# Patient Record
Sex: FEMALE | Race: BLACK OR AFRICAN AMERICAN
[De-identification: names, ages, dates, MRNs, and addresses within clinical notes are randomized per-mention and may not be internally consistent; named-entity substitution may affect disease eponyms.]

---

## 2017-01-29 ENCOUNTER — HOSPITAL ENCOUNTER (EMERGENCY)
Dept: HOSPITAL 25 - ED | Age: 47
Discharge: HOME | End: 2017-01-29
Payer: COMMERCIAL

## 2017-01-29 VITALS — SYSTOLIC BLOOD PRESSURE: 118 MMHG | DIASTOLIC BLOOD PRESSURE: 79 MMHG

## 2017-01-29 DIAGNOSIS — R06.02: ICD-10-CM

## 2017-01-29 DIAGNOSIS — R07.9: ICD-10-CM

## 2017-01-29 DIAGNOSIS — F41.0: Primary | ICD-10-CM

## 2017-01-29 LAB
ALBUMIN SERPL BCG-MCNC: 3.9 G/DL (ref 3.2–5.2)
ALP SERPL-CCNC: 56 U/L (ref 34–104)
ALT SERPL W P-5'-P-CCNC: 20 U/L (ref 7–52)
ANION GAP SERPL CALC-SCNC: 6 MMOL/L (ref 2–11)
AST SERPL-CCNC: 23 U/L (ref 13–39)
BUN SERPL-MCNC: 12 MG/DL (ref 6–24)
BUN/CREAT SERPL: 17.9 (ref 8–20)
CALCIUM SERPL-MCNC: 9.3 MG/DL (ref 8.6–10.3)
CHLORIDE SERPL-SCNC: 103 MMOL/L (ref 101–111)
GLOBULIN SER CALC-MCNC: 3.5 G/DL (ref 2–4)
GLUCOSE SERPL-MCNC: 96 MG/DL (ref 70–100)
HCO3 SERPL-SCNC: 26 MMOL/L (ref 22–32)
HCT VFR BLD AUTO: 39 % (ref 35–47)
HGB BLD-MCNC: 13.2 G/DL (ref 12–16)
MCH RBC QN AUTO: 30 PG (ref 27–31)
MCHC RBC AUTO-ENTMCNC: 34 G/DL (ref 31–36)
MCV RBC AUTO: 89 FL (ref 80–97)
POTASSIUM SERPL-SCNC: 4 MMOL/L (ref 3.5–5)
PROT SERPL-MCNC: 7.4 G/DL (ref 6.4–8.9)
RBC # BLD AUTO: 4.36 10^6/UL (ref 4–5.4)
SODIUM SERPL-SCNC: 135 MMOL/L (ref 133–145)
TROPONIN I SERPL-MCNC: 0.01 NG/ML (ref ?–0.04)
WBC # BLD AUTO: 7.9 10^3/UL (ref 3.5–10.8)

## 2017-01-29 PROCEDURE — 71010: CPT

## 2017-01-29 PROCEDURE — 83605 ASSAY OF LACTIC ACID: CPT

## 2017-01-29 PROCEDURE — 36415 COLL VENOUS BLD VENIPUNCTURE: CPT

## 2017-01-29 PROCEDURE — 85025 COMPLETE CBC W/AUTO DIFF WBC: CPT

## 2017-01-29 PROCEDURE — 99282 EMERGENCY DEPT VISIT SF MDM: CPT

## 2017-01-29 PROCEDURE — 84484 ASSAY OF TROPONIN QUANT: CPT

## 2017-01-29 PROCEDURE — 93005 ELECTROCARDIOGRAM TRACING: CPT

## 2017-01-29 PROCEDURE — 80053 COMPREHEN METABOLIC PANEL: CPT

## 2017-01-29 PROCEDURE — 96374 THER/PROPH/DIAG INJ IV PUSH: CPT

## 2017-01-29 NOTE — RAD
Indication: Chest pain.



Single frontal view of the chest performed at 1150 hours was reviewed.



Comparison is made with previous exam dated October 08, 2016.



No mediastinal shift is noted. Heart is of normal size and configuration. Lung fields

appear clear.



IMPRESSION: NO ACTIVE CARDIOPULMONARY DISEASE IS NOTED.

## 2017-02-02 NOTE — ED
Chet MOSER Claudia, scribed for Chacha Pavon MD on 01/29/17 at 1148 .





Respiratory





- HPI Summary


HPI Summary: 





46 year old female presents tot he ED with SOB and trouble catching her breath. 

Pt notes sudden onset when she found out that she was meeting her son in an 

ambulance at [facility]. PMHx of panic attacks, anxiety is noted. Pt did not take her 

lorazepam today. Pt denies any abd pain but admits to some CP and notes it is 

hard to catch her breath. 





- History of Current Complaint


Chief Complaint: EDDizziness


Stated Complaint: HIGH BLOOD PRESSURE/DIZZY


Hx Obtained From: Patient


Onset/Duration: Sudden Onset, Still Present


Timing: Constant


Pain Intensity: 7


Associated Signs and Symptoms: SOB, Chest Pain





- Allergy/Home Medications


Allergies/Adverse Reactions: 


 Allergies











Allergy/AdvReac Type Severity Reaction Status Date / Time


 


Ibuprofen [From Motrin] AdvReac Intermediate Abdominal Verified 01/29/17 11:06





   Pain  














PMH/Surg Hx/FS Hx/Imm Hx


Previously Healthy: Yes


Cardiovascular History: Reports: Hx Hypertension


   Denies: Hx Congestive Heart Failure, Hx Pacemaker/ICD


Respiratory History: Reports: Hx Asthma


   Denies: Hx Chronic Obstructive Pulmonary Disease (COPD)


 History: 


   Denies: Hx Dialysis, Hx Renal Disease


Musculoskeletal History: Reports: Other Musculoskeletal History - fibromyalgia


Sensory History: 


   Denies: Hx Hearing Aid


Neurological History: Reports: Other Neuro Impairments/Disorders - vertigo


Psychiatric History: Reports: Hx Anxiety, Hx Panic Disorder - ANXIETY





- Cancer History


Hx Chemotherapy: No


Hx Radiation Therapy: No





- Surgical History


Surgery Procedure, Year, and Place: PARTIAL HYSTERECTOMY 3/2000, TUBAL 1997, 

GALLBLADDER 1995





- Immunization History


Date of Tetanus Vaccine: Unk


Date of Influenza Vaccine: None Fall 2014


Infectious Disease History: No


Infectious Disease History: Reports: Hx of Known/Suspected MRSA


   Denies: Traveled Outside the [place] in Last 30 Days





- Family History


Known Family History: Positive: None - reviewed & noncontributory


   Negative: Cardiac Disease, Hypertension, Diabetes, Other - negative FHx of 

breast CA





- Social History


Lives: With Family


Alcohol Use: Occasionally


Hx Substance Use: No


Substance Use Type: Reports: None


Hx Tobacco Use: No


Smoking Status (MU): Never Smoked Tobacco





Review of Systems


Constitutional: Negative


Eyes: Negative


ENT: Negative


Positive: Chest Pain


Positive: Shortness Of Breath


Negative: Abdominal Pain


Genitourinary: Negative


Musculoskeletal: Negative


Skin: Negative


Neurological: Negative


Positive: Anxious


All Other Systems Reviewed And Are Negative: Yes





Physical Exam


Triage Information Reviewed: Yes


Vital Signs On Initial Exam: 


 Initial Vitals











Temp Pulse Resp BP Pulse Ox


 


 98.2 F   113   22   153/72   98 


 


 01/29/17 11:03  01/29/17 11:03  01/29/17 11:03  01/29/17 11:03  01/29/17 11:03











Vital Signs Reviewed: Yes


Appearance: Positive: Well-Appearing, No Pain Distress


Skin: Positive: Warm, Skin Color Reflects Adequate Perfusion, Dry


Head/Face: Positive: Normal Head/Face Inspection


Eyes: Positive: EOMI, MAHAMED


ENT: Positive: Pharynx normal, TMs normal


Neck: Positive: Supple, Nontender


Respiratory/Lung Sounds: Positive: Clear to Auscultation, Breath Sounds Present

, Other - tachypnea


Cardiovascular: Positive: Tachycardia.  Negative: Murmur, Rub


Abdomen Description: Positive: Nontender, Soft.  Negative: Distended, Guarding


Musculoskeletal: Positive: Strength/ROM Intact.  Negative: Edema Left, Edema 

Right


Neurological: Positive: Sensory/Motor Intact, Alert, Oriented to Person Place, 

Time, CN Intact II-III


Psychiatric: Positive: Affect/Mood Appropriate





Diagnostics





- Vital Signs


 Vital Signs











  Temp Pulse Resp BP Pulse Ox


 


 01/29/17 11:03  98.2 F  113  22  153/72  98














- Laboratory


Lab Results: 


 Lab Results











  01/29/17 01/29/17 01/29/17 Range/Units





  12:10 12:10 12:10 


 


WBC  7.9    (3.5-10.8)  10^3/ul


 


RBC  4.36    (4.0-5.4)  10^6/ul


 


Hgb  13.2    (12.0-16.0)  g/dl


 


Hct  39    (35-47)  %


 


MCV  89    (80-97)  fL


 


MCH  30    (27-31)  pg


 


MCHC  34    (31-36)  g/dl


 


RDW  14    (10.5-15)  %


 


Plt Count  320    (150-450)  10^3/ul


 


MPV  7 L    (7.4-10.4)  um3


 


Neut % (Auto)  66.9    (38-83)  %


 


Lymph % (Auto)  22.2 L    (25-47)  %


 


Mono % (Auto)  9.0    (1-9)  %


 


Eos % (Auto)  1.1    (0-6)  %


 


Baso % (Auto)  0.8    (0-2)  %


 


Absolute Neuts (auto)  5.3    (1.5-7.7)  10^3/ul


 


Absolute Lymphs (auto)  1.7    (1.0-4.8)  10^3/ul


 


Absolute Monos (auto)  0.7    (0-0.8)  10^3/ul


 


Absolute Eos (auto)  0.1    (0-0.6)  10^3/ul


 


Absolute Basos (auto)  0.1    (0-0.2)  10^3/ul


 


Absolute Nucleated RBC  0    10^3/ul


 


Nucleated RBC %  0    


 


Sodium   135   (133-145)  mmol/L


 


Potassium   4.0   (3.5-5.0)  mmol/L


 


Chloride   103   (101-111)  mmol/L


 


Carbon Dioxide   26   (22-32)  mmol/L


 


Anion Gap   6   (2-11)  mmol/L


 


BUN   12   (6-24)  mg/dL


 


Creatinine   0.67   (0.51-0.95)  mg/dL


 


Est GFR ( Amer)   121.3   (>60)  


 


Est GFR (Non-Af Amer)   94.3   (>60)  


 


BUN/Creatinine Ratio   17.9   (8-20)  


 


Glucose   96   ()  mg/dL


 


Lactic Acid    1.2  (0.5-2.0)  mmol/L


 


Calcium   9.3   (8.6-10.3)  mg/dL


 


Total Bilirubin   0.50   (0.2-1.0)  mg/dL


 


AST   23   (13-39)  U/L


 


ALT   20   (7-52)  U/L


 


Alkaline Phosphatase   56   ()  U/L


 


Troponin I   0.01   (<0.04)  ng/mL


 


Total Protein   7.4   (6.4-8.9)  g/dL


 


Albumin   3.9   (3.2-5.2)  g/dL


 


Globulin   3.5   (2-4)  g/dL


 


Albumin/Globulin Ratio   1.1   (1-3)  











Result Diagrams: 


 01/29/17 12:10





 01/29/17 12:10


Lab Statement: Any lab studies that have been ordered have been reviewed, and 

results considered in the medical decision making process.





- Radiology


  ** CHEST XRAY 


Xray Interpretation: No Acute Changes - NO ACTIVE CARDIOPULOMARY DISEASE IS 

NOTED


Radiology Interpretation Completed By: Radiologist





- EKG


  ** 11:09


Cardiac Rate: Tachycardia


EKG Rhythm: Sinus Tachycardia - 104 beats/min





Re-Evaluation





- Re-Evaluation


  ** 1


Re-Evaluation Time: 01:30


Change: Improved


Comment: Pt is improved and is ready to be d/c home.





Disposition





- Diagnoses


Provider Diagnoses: 


 Panic attack








Discharge





- Discharge Plan


Condition: Stable


Disposition: HOME


Patient Education Materials:  Anxiety (ED)


Referrals: 


Lin Lima MD [Primary Care Provider] - 3 Days





The documentation as recorded by the Chet howell Claudia accurately 

reflects the service I personally performed and the decisions made by me, 

Chacha Pavon MD.

## 2017-02-20 ENCOUNTER — HOSPITAL ENCOUNTER (EMERGENCY)
Dept: HOSPITAL 25 - ED | Age: 47
LOS: 1 days | Discharge: HOME | End: 2017-02-21
Payer: COMMERCIAL

## 2017-02-20 ENCOUNTER — HOSPITAL ENCOUNTER (EMERGENCY)
Dept: HOSPITAL 25 - ED | Age: 47
Discharge: HOME | End: 2017-02-20
Payer: COMMERCIAL

## 2017-02-20 VITALS — SYSTOLIC BLOOD PRESSURE: 108 MMHG | DIASTOLIC BLOOD PRESSURE: 62 MMHG

## 2017-02-20 DIAGNOSIS — B34.9: Primary | ICD-10-CM

## 2017-02-20 DIAGNOSIS — R51: Primary | ICD-10-CM

## 2017-02-20 DIAGNOSIS — F32.9: ICD-10-CM

## 2017-02-20 DIAGNOSIS — R42: ICD-10-CM

## 2017-02-20 DIAGNOSIS — J45.909: ICD-10-CM

## 2017-02-20 DIAGNOSIS — Z91.14: ICD-10-CM

## 2017-02-20 DIAGNOSIS — I10: ICD-10-CM

## 2017-02-20 DIAGNOSIS — M79.7: ICD-10-CM

## 2017-02-20 DIAGNOSIS — E11.9: ICD-10-CM

## 2017-02-20 DIAGNOSIS — F41.9: ICD-10-CM

## 2017-02-20 DIAGNOSIS — R11.10: ICD-10-CM

## 2017-02-20 DIAGNOSIS — R07.9: ICD-10-CM

## 2017-02-20 LAB
ALBUMIN SERPL BCG-MCNC: 4 G/DL (ref 3.2–5.2)
ALBUMIN SERPL BCG-MCNC: 4.2 G/DL (ref 3.2–5.2)
ALP SERPL-CCNC: 66 U/L (ref 34–104)
ALP SERPL-CCNC: 69 U/L (ref 34–104)
ALT SERPL W P-5'-P-CCNC: 21 U/L (ref 7–52)
ALT SERPL W P-5'-P-CCNC: 25 U/L (ref 7–52)
ANION GAP SERPL CALC-SCNC: (no result) MMOL/L (ref 2–11)
ANION GAP SERPL CALC-SCNC: 7 MMOL/L (ref 2–11)
AST SERPL-CCNC: (no result) U/L (ref 13–39)
AST SERPL-CCNC: 22 U/L (ref 13–39)
BUN SERPL-MCNC: 8 MG/DL (ref 6–24)
BUN SERPL-MCNC: 9 MG/DL (ref 6–24)
BUN/CREAT SERPL: 11.1 (ref 8–20)
BUN/CREAT SERPL: 13.8 (ref 8–20)
CALCIUM SERPL-MCNC: 9.4 MG/DL (ref 8.6–10.3)
CALCIUM SERPL-MCNC: 9.6 MG/DL (ref 8.6–10.3)
CHLORIDE SERPL-SCNC: 103 MMOL/L (ref 101–111)
CHLORIDE SERPL-SCNC: 104 MMOL/L (ref 101–111)
GLOBULIN SER CALC-MCNC: 3.6 G/DL (ref 2–4)
GLOBULIN SER CALC-MCNC: 4.1 G/DL (ref 2–4)
GLUCOSE SERPL-MCNC: 131 MG/DL (ref 70–100)
GLUCOSE SERPL-MCNC: 99 MG/DL (ref 70–100)
HCO3 SERPL-SCNC: 18 MMOL/L (ref 22–32)
HCO3 SERPL-SCNC: 24 MMOL/L (ref 22–32)
HCT VFR BLD AUTO: 42 % (ref 35–47)
HCT VFR BLD AUTO: 44 % (ref 35–47)
HGB BLD-MCNC: 14 G/DL (ref 12–16)
HGB BLD-MCNC: 14.8 G/DL (ref 12–16)
MAGNESIUM SERPL-MCNC: 1.9 MG/DL (ref 1.9–2.7)
MCH RBC QN AUTO: 30 PG (ref 27–31)
MCH RBC QN AUTO: 30 PG (ref 27–31)
MCHC RBC AUTO-ENTMCNC: 33 G/DL (ref 31–36)
MCHC RBC AUTO-ENTMCNC: 34 G/DL (ref 31–36)
MCV RBC AUTO: 89 FL (ref 80–97)
MCV RBC AUTO: 89 FL (ref 80–97)
POTASSIUM SERPL-SCNC: (no result) MMOL/L (ref 3.5–5)
POTASSIUM SERPL-SCNC: 3.6 MMOL/L (ref 3.5–5)
PROT SERPL-MCNC: 7.6 G/DL (ref 6.4–8.9)
PROT SERPL-MCNC: 8.3 G/DL (ref 6.4–8.9)
RBC # BLD AUTO: 4.66 10^6/UL (ref 4–5.4)
RBC # BLD AUTO: 4.99 10^6/UL (ref 4–5.4)
SODIUM SERPL-SCNC: 133 MMOL/L (ref 133–145)
SODIUM SERPL-SCNC: 134 MMOL/L (ref 133–145)
TROPONIN I SERPL-MCNC: 0 NG/ML (ref ?–0.04)
WBC # BLD AUTO: 5.1 10^3/UL (ref 3.5–10.8)
WBC # BLD AUTO: 6.7 10^3/UL (ref 3.5–10.8)

## 2017-02-20 PROCEDURE — 80053 COMPREHEN METABOLIC PANEL: CPT

## 2017-02-20 PROCEDURE — 71020: CPT

## 2017-02-20 PROCEDURE — 85025 COMPLETE CBC W/AUTO DIFF WBC: CPT

## 2017-02-20 PROCEDURE — 99284 EMERGENCY DEPT VISIT MOD MDM: CPT

## 2017-02-20 PROCEDURE — 36415 COLL VENOUS BLD VENIPUNCTURE: CPT

## 2017-02-20 PROCEDURE — 83735 ASSAY OF MAGNESIUM: CPT

## 2017-02-20 PROCEDURE — 70450 CT HEAD/BRAIN W/O DYE: CPT

## 2017-02-20 PROCEDURE — 99283 EMERGENCY DEPT VISIT LOW MDM: CPT

## 2017-02-20 PROCEDURE — 85652 RBC SED RATE AUTOMATED: CPT

## 2017-02-20 PROCEDURE — 96374 THER/PROPH/DIAG INJ IV PUSH: CPT

## 2017-02-20 PROCEDURE — 96375 TX/PRO/DX INJ NEW DRUG ADDON: CPT

## 2017-02-20 PROCEDURE — 93005 ELECTROCARDIOGRAM TRACING: CPT

## 2017-02-20 PROCEDURE — 96376 TX/PRO/DX INJ SAME DRUG ADON: CPT

## 2017-02-20 PROCEDURE — 84484 ASSAY OF TROPONIN QUANT: CPT

## 2017-02-20 NOTE — RAD
INDICATION: Cough



COMPARISON: Most recent comparison chest x-rays dated January 29, 2017



TECHNIQUE: PA and lateral views of the chest were obtained.



FINDINGS:



The heart and mediastinum are normal in size and contour.



The lungs are grossly clear. There is no evidence of large pleural effusion.



Visualized bones are normal for the patient's age.



There is no radiographic evidence of free air beneath the diaphragm



IMPRESSION: 

No radiographic evidence of acute cardiopulmonary disease.

## 2017-02-20 NOTE — RAD
Indication: Headache.



CT of the brain was performed without IV contrast.



Comparison is made with previous exam dated September 19, 2016.



Ventricular structures are midline. No midline shift is noted. The extra-axial spaces are

unremarkable. There is no evidence of intracranial mass or hemorrhage. No other high or

low density lesions are identified.



Mastoid air cells and paranasal sinuses are otherwise unremarkable.



IMPRESSION: No intracranial mass or hemorrhage is noted.

## 2017-02-20 NOTE — ED
patricia MOSER Timothy, scribed for Vikram Canada MD on 02/20/17 at 0233 .





HPI Chest Pain





- HPI Summary


HPI Summary: 





Marielos Dumont is a 47 yo female presenting to [facility] with 10/10 HA, 

photosensitivity, dizziness, CP, and vomiting since 1600 2/19/17. Her MHx 

includes vertigo, DM II, fibromylagia, anxiety, asthma.





- History of Current Complaint


Chief Complaint: EDChestPainROMI


Time Seen by Provider: 02/20/17 02:29


Hx Obtained From: Patient


Onset/Duration: Started Hours Ago, Still Present


Time of Onset: 16:00


Timing: Constant


Initial Severity: Moderate


Current Severity: Moderate


Pain Intensity: 10


Pain Scale Used: 0-10 Numeric


Chest Pain Location: Diffuse


Associated Signs and Symptoms: Positive: Chest Pain, Dizziness, Vomiting





- Allergy/Home Medications


Allergies/Adverse Reactions: 


 Allergies











Allergy/AdvReac Type Severity Reaction Status Date / Time


 


Ibuprofen [From Motrin] AdvReac Intermediate Abdominal Verified 01/29/17 11:06





   Pain  














PMH/Surg Hx/FS Hx/Imm Hx


Endocrine/Hematology History: Reports: Hx Diabetes


Cardiovascular History: Reports: Hx Hypertension


   Denies: Hx Congestive Heart Failure, Hx Pacemaker/ICD


Respiratory History: Reports: Hx Asthma


   Denies: Hx Chronic Obstructive Pulmonary Disease (COPD)


 History: 


   Denies: Hx Dialysis, Hx Renal Disease


Musculoskeletal History: Reports: Other Musculoskeletal History - fibromyalgia


Sensory History: 


   Denies: Hx Hearing Aid


Neurological History: Reports: Other Neuro Impairments/Disorders - vertigo


Psychiatric History: Reports: Hx Anxiety, Hx Panic Disorder - ANXIETY





- Cancer History


Hx Chemotherapy: No


Hx Radiation Therapy: No





- Surgical History


Surgery Procedure, Year, and Place: PARTIAL HYSTERECTOMY 3/2000, TUBAL 1997, 

GALLBLADDER 1995





- Immunization History


Date of Tetanus Vaccine: Unk


Date of Influenza Vaccine: None Fall 2014


Infectious Disease History: Yes


Infectious Disease History: Reports: Hx of Known/Suspected MRSA


   Denies: Traveled Outside the [place] in Last 30 Days





- Family History


Known Family History: Positive: None - reviewed & noncontributory


   Negative: Cardiac Disease, Hypertension, Diabetes, Other - negative FHx of 

breast CA





- Social History


Alcohol Use: Occasionally


Hx Substance Use: No


Substance Use Type: Reports: None


Hx Tobacco Use: No


Smoking Status (MU): Never Smoked Tobacco





Review of Systems


Constitutional: Negative


Positive: Photophobia


ENT: Negative


Positive: Chest Pain


Respiratory: Negative


Positive: Vomiting


Genitourinary: Negative


Musculoskeletal: Negative


Skin: Negative


Neurological: Other - dizziness


Positive: Headache


Psychological: Normal


All Other Systems Reviewed And Are Negative: Yes





Physical Exam


Triage Information Reviewed: Yes


Vital Signs On Initial Exam: 


 Initial Vitals











Temp Pulse Resp BP Pulse Ox


 


 99.3 F   123   20   135/83   100 


 


 02/20/17 02:06  02/20/17 02:06  02/20/17 02:06  02/20/17 02:06  02/20/17 02:06











Vital Signs Reviewed: Yes


Appearance: Positive: No Pain Distress, Thin


Skin: Positive: Warm


Eyes: Positive: EOMI, MAHAMED


ENT: Positive: Hearing grossly normal


Neck: Positive: Supple


Respiratory/Lung Sounds: Positive: Clear to Auscultation, Breath Sounds Present


Cardiovascular: Positive: RRR.  Negative: Murmur


Abdomen Description: Positive: Nontender, Soft


Bowel Sounds: Positive: Present


Musculoskeletal: Positive: Strength/ROM Intact


Neurological: Positive: Sensory/Motor Intact, Alert, Oriented to Person Place, 

Time, Normal Gait


Psychiatric: Positive: Affect/Mood Appropriate





Diagnostics





- Vital Signs


 Vital Signs











  Temp Pulse Resp BP Pulse Ox


 


 02/20/17 02:06  99.3 F  123  20  135/83  100














- Laboratory


Result Diagrams: 


 02/20/17 03:55





 02/20/17 03:55


Lab Statement: Any lab studies that have been ordered have been reviewed, and 

results considered in the medical decision making process.





- Radiology


  ** CXR


Xray Interpretation: No Acute Changes - No acute disease


Radiology Interpretation Completed By: ED Physician





- EKG


  ** 0219


Cardiac Rate: Tachycardia


EKG Rhythm: Sinus Rhythm


EKG Interpretation: Sinus tachycardia @ 110 BPM. 





Re-Evaluation





- Re-Evaluation


  ** First Eval


Re-Evaluation Time: 05:00


Change: Improved





Chest Pain Course/Dx





- Course


Assessment/Plan: Marielos Dumont is a 47 yo female presenting to [facility] with HA, 

photosensitivity, CP, dizziness, and vomiting. After normal EKG and CXR, as 

well as review of other lab work, she will be discharged home with viral 

syndrome and appropriate instructions.





- Diagnoses


Provider Diagnoses: 


 Viral syndrome








Discharge





- Discharge Plan


Condition: Stable


Disposition: HOME


Patient Education Materials:  Viral Syndrome (ED)


Referrals: 


Lin Lima MD [Primary Care Provider] - 2 Days


Additional Instructions: 


Please follow up with your primary care physician regarding your visit to the 

emergency department today. Return to the emergency department with any new or 

recurring symptoms.





The documentation as recorded by the patricia howell Timothy accurately 

reflects the service I personally performed and the decisions made by me, 

Vikram Canada MD.

## 2017-02-20 NOTE — ED
Jayda MOSER Anna, scribed for Chacha Pavon MD on 02/20/17 at 1751 .





Progress





- Progress Note


Progress Note: 


Patient is a 45 y/o female coming to [facility] presenting with gradual onset of a 

constant, worsening HA that began two days ago. She expresses dizziness. 

Patient denies history of migraines. She reports she has not taken her 

medication for her HTN because she has also experienced emesis as a result of 

the HA.





Course/Dx





- Diagnoses


Provider Diagnoses: 


 Headache








The documentation as recorded by the Jayda howell Anna accurately reflects 

the service I personally performed and the decisions made by Librado deutsch Justine, MD.

## 2017-02-20 NOTE — ED
Headache





- HPI Summary


HPI Summary: 





The patient is a 46 female presenting to ED for complaint of right frontal 

headache which began gradually 2 days ago and has been progressively worse. 

Current headache described as 10/10 squeezing pain non-radiating. Admits to 

associated photophobia, phonophobia, dizziness described as the room spinning, 

nausea, and vomiting. Denies history of migraines. Has been unable to take oral 

meds. History of HTN, DM, Vertigo, Fibromyalgia, Depression. On Januvia, Valium

, Lyrica and Lisinopril. Denies significant FH. 





Allergy ibuprofen - nausea, dyspepsia. 





SH: Denies smoking or IVDU. Occasional alcohol use. 





- History Of Current Complaint


Chief Complaint: EDHeadache


Stated Complaint: HEADACHE/DIZZY/FEVER


Time Seen by Provider: 02/20/17 19:09





- Allergies/Home Medications


Allergies/Adverse Reactions: 


 Allergies











Allergy/AdvReac Type Severity Reaction Status Date / Time


 


Ibuprofen [From Motrin] AdvReac Intermediate Abdominal Verified 01/29/17 11:06





   Pain  














PMH/Surg Hx/FS Hx/Imm Hx


Endocrine/Hematology History: Reports: Hx Diabetes


Cardiovascular History: Reports: Hx Hypertension


   Denies: Hx Congestive Heart Failure, Hx Pacemaker/ICD


Respiratory History: Reports: Hx Asthma


   Denies: Hx Chronic Obstructive Pulmonary Disease (COPD)


 History: 


   Denies: Hx Dialysis, Hx Renal Disease


Musculoskeletal History: Reports: Other Musculoskeletal History - fibromyalgia


Sensory History: 


   Denies: Hx Hearing Aid


Neurological History: Reports: Other Neuro Impairments/Disorders - vertigo


Psychiatric History: Reports: Hx Anxiety, Hx Panic Disorder - ANXIETY





- Cancer History


Hx Chemotherapy: No


Hx Radiation Therapy: No





- Surgical History


Surgery Procedure, Year, and Place: PARTIAL HYSTERECTOMY 3/2000, TUBAL 1997, 

GALLBLADDER 1995





- Immunization History


Date of Tetanus Vaccine: Unk


Date of Influenza Vaccine: None Fall 2014


Infectious Disease History: Yes


Infectious Disease History: Reports: Hx of Known/Suspected MRSA


   Denies: Traveled Outside the [place] in Last 30 Days





- Family History


Known Family History: Positive: None - reviewed & noncontributory


   Negative: Cardiac Disease, Hypertension, Diabetes, Other - negative FHx of 

breast CA





- Social History


Alcohol Use: Occasionally


Hx Substance Use: No


Substance Use Type: Reports: None


Substance Use Comment - Amount & Last Used: Unknown


Hx Tobacco Use: No


Smoking Status (MU): Never Smoked Tobacco





Review of Systems


Constitutional: Negative


Positive: Photophobia.  Negative: Blurred Vision, Diplopia


ENT: Negative


Negative: Sore Throat, Nasal Discharge


Cardiovascular: Negative


Negative: Chest Pain


Positive: Cough.  Negative: Shortness Of Breath


Positive: Vomiting, Nausea.  Negative: Abdominal Pain, Diarrhea


Genitourinary: Negative


Negative: dysuria, frequency, hematuria


Musculoskeletal: Negative


Negative: Arthralgia, Decreased ROM


Skin: Negative


Negative: Rash, Bruising


Positive: Headache.  Negative: Weakness, Paresthesia, Numbness, Slurred Speech


Positive: Anxious


All Other Systems Reviewed And Are Negative: Yes





Physical Exam





- Summary


Physical Exam Summary: 





Strong odor of marijuana in room.


Triage Information Reviewed: Yes


Vital Signs On Initial Exam: 


 Initial Vitals











Temp Pulse Resp BP Pulse Ox


 


 99.0 F   110   20   132/85   100 


 


 02/20/17 16:40  02/20/17 16:40  02/20/17 16:40  02/20/17 16:40  02/20/17 16:40











Vital Signs Reviewed: Yes


Appearance: Positive: Well-Appearing, Pain Distress - moderate pain; guarding 

eyes from bright lights


Skin: Positive: Warm, Skin Color Reflects Adequate Perfusion, Dry


Head/Face: Positive: Normal Head/Face Inspection, Other - no palpable cord.  

Negative: Temporal Artery Tenderness, TMJ Tenderness, Cephalohematoma


Eyes: Positive: Normal, EOMI - no nystagmus, MAHAMED - positive photophobia, 

Conjunctiva Clear.  Negative: Conjunctiva Inflammed, Discharge


ENT: Positive: Normal ENT inspection, Hearing grossly normal, Pharynx normal, 

TMs normal.  Negative: Pharyngeal erythema, Nasal congestion, Nasal drainage, 

TM bulging, TM dull, TM red, Tonsillar swelling, Tonsillar exudate


Neck: Positive: Supple, Nontender, No Lymphadenopathy


Respiratory/Lung Sounds: Positive: Clear to Auscultation, Breath Sounds Present

, Other - bronchospasmic cough.  Negative: Decreased Breath Sounds, Rales, 

Rhonchi, Wheezes, Unable to speak in full sentences


Cardiovascular: Positive: Normal - radial pulse 2+, RRR, S1, S2.  Negative: 

Murmur, Rub, Tachycardia, Leg Edema Left, Leg Edema Right, S3


Abdomen Description: Positive: Nontender, No Organomegaly, Soft.  Negative: CVA 

Tenderness (R), CVA Tenderness (L), Distended, Guarding, Peritoneal Signs


Bowel Sounds: Positive: Present


Musculoskeletal: Positive: Normal, Strength/ROM Intact - 5/5 muscle strength 

bilateral UE/LE


Neurological: Positive: Normal, Sensory/Motor Intact, Alert, Oriented to Person 

Place, Time, CN Intact II-III, Reflexes Intact, Babinski Bilateral - downward, 

Finger to Nose - normal, Facial Symmetry, Speech Normal.  Negative: Receptive 

Aphasia, Expressive Aphasia, Slurred Speech, Dysarthric Aphasia, Pronator Drift 

Present


Psychiatric: Positive: Normal


AVPU Assessment: Alert





Diagnostics





- Vital Signs


 Vital Signs











  Temp Pulse Resp BP Pulse Ox


 


 02/20/17 19:31    16  


 


 02/20/17 18:52  99.1 F  117  20  117/75  100


 


 02/20/17 17:42  97.8 F  106  18  134/78  99


 


 02/20/17 16:40  99.0 F  110  20  132/85  100














- Laboratory


Lab Results: 


 Lab Results











  02/20/17 02/20/17 Range/Units





  18:21 18:43 


 


WBC   5.1  (3.5-10.8)  10^3/ul


 


RBC   4.99  (4.0-5.4)  10^6/ul


 


Hgb   14.8  (12.0-16.0)  g/dl


 


Hct   44  (35-47)  %


 


MCV   89  (80-97)  fL


 


MCH   30  (27-31)  pg


 


MCHC   33  (31-36)  g/dl


 


RDW   14  (10.5-15)  %


 


Plt Count   362  (150-450)  10^3/ul


 


MPV   7 L  (7.4-10.4)  um3


 


Neut % (Auto)   70.1  (38-83)  %


 


Lymph % (Auto)   14.1 L  (25-47)  %


 


Mono % (Auto)   14.6 H  (1-9)  %


 


Eos % (Auto)   0.7  (0-6)  %


 


Baso % (Auto)   0.5  (0-2)  %


 


Absolute Neuts (auto)   3.5  (1.5-7.7)  10^3/ul


 


Absolute Lymphs (auto)   0.7 L  (1.0-4.8)  10^3/ul


 


Absolute Monos (auto)   0.7  (0-0.8)  10^3/ul


 


Absolute Eos (auto)   0  (0-0.6)  10^3/ul


 


Absolute Basos (auto)   0  (0-0.2)  10^3/ul


 


Absolute Nucleated RBC   0.01  10^3/ul


 


Nucleated RBC %   0.2  


 


ESR   Pending  


 


Sodium  133   (133-145)  mmol/L


 


Potassium  Pending   


 


Chloride  104   (101-111)  mmol/L


 


Carbon Dioxide  18 L   (22-32)  mmol/L


 


Anion Gap  Pending   


 


BUN  8   (6-24)  mg/dL


 


Creatinine  0.72   (0.51-0.95)  mg/dL


 


Est GFR ( Amer)  111.7   (>60)  


 


Est GFR (Non-Af Amer)  86.8   (>60)  


 


BUN/Creatinine Ratio  11.1   (8-20)  


 


Glucose  99   ()  mg/dL


 


Calcium  9.6   (8.6-10.3)  mg/dL


 


Total Bilirubin  0.40   (0.2-1.0)  mg/dL


 


AST  Pending   


 


ALT  25   (7-52)  U/L


 


Alkaline Phosphatase  69   ()  U/L


 


Total Protein  8.3   (6.4-8.9)  g/dL


 


Albumin  4.2   (3.2-5.2)  g/dL


 


Globulin  4.1 H   (2-4)  g/dL


 


Albumin/Globulin Ratio  1.0   (1-3)  











Result Diagrams: 


 02/20/17 18:43





 02/20/17 21:15


Lab Statement: Any lab studies that have been ordered have been reviewed, and 

results considered in the medical decision making process.





Re-Evaluation





- Re-Evaluation


  ** First Eval


Re-Evaluation Time: 20:54


Change: Improved


Comment: Current pain decreased to 6/10. Will order additional analgesic.





  ** Second Eval


Re-Evaluation Time: 21:54


Change: Unchanged


Comment: Patient states no improvement in pain. RN reports patient refused 

Toradol. Patient states to me "I looked up toradol and it interacts with my 

Lexapro." I have indicated to patient single dose of toradol will not cause 

significant interaction with SSRI. She is now agreeable with Toradol.





  ** Third Eval


Re-Evaluation Time: 23:10


Change: Improved


Comment: Patient tolerated Toradol well. Significant improvement in symptoms 

stating current headache 2/10. Stable for discharge.





Headache Course/Dx





- Course


Assessment/Plan: Patient presented for evaluation of headache. Labs reviewed 

and grossly unremarkable not requiring any emergent intervention. CT brain 

without acute intracranial process. Demonstrated significant improvement in 

headache after Toradol, Reglan, Benadryl. Adivsed on rest, oral hydration and 

adequate nutritional intake. Advised to call Dr. Lima tomorrow to schedule 

follow-up within 3-5 days.





- Diagnoses


Provider Diagnoses: 


 Headache








Discharge





- Discharge Plan


Condition: Stable


Disposition: HOME


Patient Education Materials:  General Headache (ED)


Referrals: 


Lin Lima MD [Primary Care Provider] - 3 Days

## 2017-02-21 VITALS — SYSTOLIC BLOOD PRESSURE: 125 MMHG | DIASTOLIC BLOOD PRESSURE: 86 MMHG

## 2017-05-21 ENCOUNTER — HOSPITAL ENCOUNTER (INPATIENT)
Dept: HOSPITAL 25 - ED | Age: 47
LOS: 5 days | Discharge: HOME | DRG: 754 | End: 2017-05-26
Attending: PSYCHIATRY & NEUROLOGY | Admitting: PSYCHIATRY & NEUROLOGY
Payer: COMMERCIAL

## 2017-05-21 DIAGNOSIS — Z79.84: ICD-10-CM

## 2017-05-21 DIAGNOSIS — F32.9: Primary | ICD-10-CM

## 2017-05-21 DIAGNOSIS — Z88.6: ICD-10-CM

## 2017-05-21 DIAGNOSIS — Z91.5: ICD-10-CM

## 2017-05-21 DIAGNOSIS — Z56.0: ICD-10-CM

## 2017-05-21 DIAGNOSIS — J45.909: ICD-10-CM

## 2017-05-21 DIAGNOSIS — T45.0X2A: ICD-10-CM

## 2017-05-21 DIAGNOSIS — Z81.8: ICD-10-CM

## 2017-05-21 DIAGNOSIS — E11.9: ICD-10-CM

## 2017-05-21 DIAGNOSIS — Z90.49: ICD-10-CM

## 2017-05-21 DIAGNOSIS — M79.7: ICD-10-CM

## 2017-05-21 DIAGNOSIS — F41.0: ICD-10-CM

## 2017-05-21 DIAGNOSIS — N63: ICD-10-CM

## 2017-05-21 DIAGNOSIS — I10: ICD-10-CM

## 2017-05-21 DIAGNOSIS — T40.2X2A: ICD-10-CM

## 2017-05-21 DIAGNOSIS — Z98.51: ICD-10-CM

## 2017-05-21 DIAGNOSIS — Z72.89: ICD-10-CM

## 2017-05-21 DIAGNOSIS — E04.1: ICD-10-CM

## 2017-05-21 DIAGNOSIS — Z90.711: ICD-10-CM

## 2017-05-21 LAB
ALBUMIN SERPL BCG-MCNC: 4.1 G/DL (ref 3.2–5.2)
ALP SERPL-CCNC: 71 U/L (ref 34–104)
ALT SERPL W P-5'-P-CCNC: 16 U/L (ref 7–52)
ANION GAP SERPL CALC-SCNC: 10 MMOL/L (ref 2–11)
APAP SERPL-MCNC: < 15 MCG/ML
AST SERPL-CCNC: 20 U/L (ref 13–39)
BUN SERPL-MCNC: 13 MG/DL (ref 6–24)
BUN/CREAT SERPL: 17.6 (ref 8–20)
CALCIUM SERPL-MCNC: 9.3 MG/DL (ref 8.6–10.3)
CHLORIDE SERPL-SCNC: 99 MMOL/L (ref 101–111)
GLOBULIN SER CALC-MCNC: 3.8 G/DL (ref 2–4)
GLUCOSE SERPL-MCNC: 126 MG/DL (ref 70–100)
HCO3 SERPL-SCNC: 26 MMOL/L (ref 22–32)
HCT VFR BLD AUTO: 40 % (ref 35–47)
HGB BLD-MCNC: 13.3 G/DL (ref 12–16)
MCH RBC QN AUTO: 30 PG (ref 27–31)
MCHC RBC AUTO-ENTMCNC: 34 G/DL (ref 31–36)
MCV RBC AUTO: 90 FL (ref 80–97)
POTASSIUM SERPL-SCNC: 3.7 MMOL/L (ref 3.5–5)
PROT SERPL-MCNC: 7.9 G/DL (ref 6.4–8.9)
RBC # BLD AUTO: 4.46 10^6/UL (ref 4–5.4)
SALICYLATES SERPL-MCNC: < 2.5 MG/DL (ref ?–30)
SODIUM SERPL-SCNC: 135 MMOL/L (ref 133–145)
WBC # BLD AUTO: 8.7 10^3/UL (ref 3.5–10.8)

## 2017-05-21 PROCEDURE — 99231 SBSQ HOSP IP/OBS SF/LOW 25: CPT

## 2017-05-21 PROCEDURE — 83605 ASSAY OF LACTIC ACID: CPT

## 2017-05-21 PROCEDURE — 36415 COLL VENOUS BLD VENIPUNCTURE: CPT

## 2017-05-21 PROCEDURE — 99232 SBSQ HOSP IP/OBS MODERATE 35: CPT

## 2017-05-21 PROCEDURE — 80320 DRUG SCREEN QUANTALCOHOLS: CPT

## 2017-05-21 PROCEDURE — 80053 COMPREHEN METABOLIC PANEL: CPT

## 2017-05-21 PROCEDURE — G0480 DRUG TEST DEF 1-7 CLASSES: HCPCS

## 2017-05-21 PROCEDURE — 80329 ANALGESICS NON-OPIOID 1 OR 2: CPT

## 2017-05-21 PROCEDURE — 81003 URINALYSIS AUTO W/O SCOPE: CPT

## 2017-05-21 PROCEDURE — 80307 DRUG TEST PRSMV CHEM ANLYZR: CPT

## 2017-05-21 PROCEDURE — 99222 1ST HOSP IP/OBS MODERATE 55: CPT

## 2017-05-21 PROCEDURE — 90853 GROUP PSYCHOTHERAPY: CPT

## 2017-05-21 PROCEDURE — 85025 COMPLETE CBC W/AUTO DIFF WBC: CPT

## 2017-05-21 PROCEDURE — 93005 ELECTROCARDIOGRAM TRACING: CPT

## 2017-05-21 SDOH — ECONOMIC STABILITY - INCOME SECURITY: UNEMPLOYMENT, UNSPECIFIED: Z56.0

## 2017-05-21 NOTE — ED
Ehsan MOSER Aidan, scribed for Osito Mcdonald MD on 05/21/17 at 1907 .





Substance Abuse/Use





- HPI Summary


HPI Summary: 


48 y/o female presents to the ED with a complaint of an acute, moderate episode 

of overdose to roughly six 15mg oxycodone tablets and four 25mg Benadryl pills. 

The patient's family members mentioned that she was upset and that this episode 

was likely intentional for the purpose of self harm. 





- History Of Current Complaint


Chief Complaint: EDOverdose


Stated Complaint: OVERDOSE


Time Seen by Provider: 05/21/17 18:45


Hx Obtained From: Patient


Pregnant?: No


Onset/Duration  of Drug/ETOH Abuse: Minutes


Ingestion History: Type/Name Of Drug - roughly six 15mg oxycodone tablets and 

four 25mg Benadryl pills.


Overdose Characteristics: Oral


Timing Of Abuse: Binge Use


Severity Initially: Moderate


Severity Currently: Moderate


Character: Depressed, Other - angry/upset


Aggravating Factor(s): Other - unknown, however, Pt's family said she was upset


Alleviating Factor(s): Other - unknown


Associated Signs And Symptoms: Other: - contrsicted pupils





- Allergies/Home Medications


Allergies/Adverse Reactions: 


 Allergies











Allergy/AdvReac Type Severity Reaction Status Date / Time


 


Ibuprofen [From Motrin] AdvReac Intermediate Abdominal Verified 01/29/17 11:06





   Pain  














PMH/Surg Hx/FS Hx/Imm Hx


Endocrine/Hematology History: Reports: Hx Diabetes


Cardiovascular History: Reports: Hx Hypertension


   Denies: Hx Congestive Heart Failure, Hx Pacemaker/ICD


Respiratory History: Reports: Hx Asthma


   Denies: Hx Chronic Obstructive Pulmonary Disease (COPD)


 History: 


   Denies: Hx Dialysis, Hx Renal Disease


Musculoskeletal History: Reports: Other Musculoskeletal History - fibromyalgia


Sensory History: 


   Denies: Hx Hearing Aid


Neurological History: Reports: Other Neuro Impairments/Disorders - vertigo


Psychiatric History: Reports: Hx Anxiety, Hx Panic Disorder - ANXIETY





- Cancer History


Hx Chemotherapy: No


Hx Radiation Therapy: No





- Surgical History


Surgery Procedure, Year, and Place: PARTIAL HYSTERECTOMY 3/2000, TUBAL 1997, 

GALLBLADDER 1995





- Immunization History


Date of Tetanus Vaccine: Unk


Date of Influenza Vaccine: None Fall 2014


Infectious Disease History: No


Infectious Disease History: Reports: Hx of Known/Suspected MRSA


   Denies: Traveled Outside the [place] in Last 30 Days





- Family History


Known Family History: 


   Negative: Cardiac Disease, Hypertension, Diabetes, Other - negative FHx of 

breast CA





- Social History


Occupation: Unemployed


Lives: Alone


Alcohol Use: Occasionally


Hx Substance Use: No


Substance Use Type: Reports: None


Substance Use Comment - Amount & Last Used: Unknown


Hx Tobacco Use: No


Smoking Status (MU): Never Smoked Tobacco





Review of Systems


Constitutional: Negative


Eyes: Negative


ENT: Negative


Cardiovascular: Negative


Respiratory: Negative


Gastrointestinal: Negative


Genitourinary: Negative


Musculoskeletal: Negative


Skin: Negative


Neurological: Negative


Psychological: Other - OD 


Positive: Depressed.  Negative: Anxious


All Other Systems Reviewed And Are Negative: Yes





Physical Exam


Triage Information Reviewed: Yes


Vital Signs On Initial Exam: 


 Initial Vitals











Pulse Resp BP Pulse Ox


 


 93   18   156/96   100 


 


 05/21/17 18:09  05/21/17 18:09  05/21/17 18:09  05/21/17 18:09











Vital Signs Reviewed: Yes


Appearance: Positive: Well-Appearing, No Pain Distress


Skin: Positive: Warm, Skin Color Reflects Adequate Perfusion, Dry


Head/Face: Positive: Normal Head/Face Inspection


Eyes: Positive: Normal, Other: - pupils were pin-point


ENT: Positive: Normal ENT inspection


Neck: Positive: Supple, Nontender


Respiratory/Lung Sounds: Positive: Clear to Auscultation, Breath Sounds Present


Cardiovascular: Positive: RRR


Abdomen Description: Positive: Nontender, Soft


Bowel Sounds: Positive: Present


Musculoskeletal: Positive: Normal


Neurological: Positive: Normal


Psychiatric: Positive: Affect/Mood Appropriate





- Corey Coma Scale


Coma Scale Total: 14





Diagnostics





- Vital Signs


 Vital Signs











  Temp Pulse Resp BP Pulse Ox


 


 05/21/17 18:38  98 F  81  16  148/91  99


 


 05/21/17 18:30   87  17  148/91  99


 


 05/21/17 18:25   92  12   97


 


 05/21/17 18:22     149/84 


 


 05/21/17 18:09   93  18  156/96  100














- Laboratory


Lab Results: 


 Lab Results











  05/21/17 05/21/17 05/21/17 Range/Units





  18:30 18:30 18:30 


 


WBC  8.7    (3.5-10.8)  10^3/ul


 


RBC  4.46    (4.0-5.4)  10^6/ul


 


Hgb  13.3    (12.0-16.0)  g/dl


 


Hct  40    (35-47)  %


 


MCV  90    (80-97)  fL


 


MCH  30    (27-31)  pg


 


MCHC  34    (31-36)  g/dl


 


RDW  14    (10.5-15)  %


 


Plt Count  342    (150-450)  10^3/ul


 


MPV  7 L    (7.4-10.4)  um3


 


Neut % (Auto)  66.8    (38-83)  %


 


Lymph % (Auto)  24.0 L    (25-47)  %


 


Mono % (Auto)  7.9    (1-9)  %


 


Eos % (Auto)  0.7    (0-6)  %


 


Baso % (Auto)  0.6    (0-2)  %


 


Absolute Neuts (auto)  5.8    (1.5-7.7)  10^3/ul


 


Absolute Lymphs (auto)  2.1    (1.0-4.8)  10^3/ul


 


Absolute Monos (auto)  0.7    (0-0.8)  10^3/ul


 


Absolute Eos (auto)  0.1    (0-0.6)  10^3/ul


 


Absolute Basos (auto)  0.1    (0-0.2)  10^3/ul


 


Absolute Nucleated RBC  0    10^3/ul


 


Nucleated RBC %  0    


 


Sodium   135   (133-145)  mmol/L


 


Potassium   3.7   (3.5-5.0)  mmol/L


 


Chloride   99 L   (101-111)  mmol/L


 


Carbon Dioxide   26   (22-32)  mmol/L


 


Anion Gap   10   (2-11)  mmol/L


 


BUN   13   (6-24)  mg/dL


 


Creatinine   0.74   (0.51-0.95)  mg/dL


 


Est GFR ( Amer)   107.7   (>60)  


 


Est GFR (Non-Af Amer)   83.8   (>60)  


 


BUN/Creatinine Ratio   17.6   (8-20)  


 


Glucose   126 H   ()  mg/dL


 


Lactic Acid    1.1  (0.5-2.0)  mmol/L


 


Calcium   9.3   (8.6-10.3)  mg/dL


 


Total Bilirubin   0.40   (0.2-1.0)  mg/dL


 


AST   20   (13-39)  U/L


 


ALT   16   (7-52)  U/L


 


Alkaline Phosphatase   71   ()  U/L


 


Total Protein   7.9   (6.4-8.9)  g/dL


 


Albumin   4.1   (3.2-5.2)  g/dL


 


Globulin   3.8   (2-4)  g/dL


 


Albumin/Globulin Ratio   1.1   (1-3)  


 


Salicylates   < 2.50   (<30)  mg/dL


 


Acetaminophen   < 15   mcg/mL


 


Serum Alcohol   < 10   (<10)  mg/dL











Result Diagrams: 


 05/21/17 18:30





 05/21/17 18:30


Lab Statement: Any lab studies that have been ordered have been reviewed, and 

results considered in the medical decision making process.





Course/Dx





- Course


Course Of Treatment: This is a 48 y/o female presenting for overdose. She took 

roughly six 15mg oxycodone tablets and four 25mg Benadryl pills. Accotrding to 

a family member of her's, she was upset. She has been here 4 1/2 hours and is 

stable.  We are awaiting a recheck on her tylenol level and she will be 

medically cleared for [facility].





- Diagnoses


Provider Diagnoses: 


 Overdose








Discharge





- Discharge Plan


Condition: Stable


Disposition: OTHER


Discharge Disposition Comment: CHange of shift





The documentation as recorded by the Ehasn howell Aidan accurately reflects 

the service I personally performed and the decisions made by me, Osito Mcdonald MD.

## 2017-05-22 RX ADMIN — LISINOPRIL SCH MG: 5 TABLET ORAL at 22:39

## 2017-05-22 RX ADMIN — THERA TABS SCH TAB: TAB at 08:49

## 2017-05-22 RX ADMIN — Medication SCH UNITS: at 22:39

## 2017-05-22 RX ADMIN — NAPROXEN PRN MG: 250 TABLET ORAL at 08:47

## 2017-05-22 RX ADMIN — SITAGLIPTIN SCH MG: 25 TABLET, FILM COATED ORAL at 08:48

## 2017-05-22 RX ADMIN — ONDANSETRON PRN MG: 4 TABLET, ORALLY DISINTEGRATING ORAL at 22:40

## 2017-05-22 RX ADMIN — ONDANSETRON PRN MG: 4 TABLET, ORALLY DISINTEGRATING ORAL at 11:00

## 2017-05-22 RX ADMIN — VENLAFAXINE HYDROCHLORIDE SCH MG: 75 CAPSULE, EXTENDED RELEASE ORAL at 22:38

## 2017-05-22 RX ADMIN — NAPROXEN PRN MG: 250 TABLET ORAL at 17:32

## 2017-05-23 RX ADMIN — VENLAFAXINE HYDROCHLORIDE SCH MG: 75 CAPSULE, EXTENDED RELEASE ORAL at 20:25

## 2017-05-23 RX ADMIN — LISINOPRIL SCH MG: 5 TABLET ORAL at 20:25

## 2017-05-23 RX ADMIN — THERA TABS SCH: TAB at 09:39

## 2017-05-23 RX ADMIN — ONDANSETRON PRN MG: 4 TABLET, ORALLY DISINTEGRATING ORAL at 08:10

## 2017-05-23 RX ADMIN — SITAGLIPTIN SCH MG: 25 TABLET, FILM COATED ORAL at 08:10

## 2017-05-23 RX ADMIN — Medication SCH UNITS: at 20:25

## 2017-05-23 NOTE — PN
Subjective





- Subjective


Service Type: 86032 Hosp care 25 min moderate complexity


Subjective: 





Marielos reported feeling relatively better today.


She denies plans to take her life, and hopes for a brief admission.


We reviewed elements of her history.


She says Effexor has made things better - symptoms are milder.  She reports a 

good experience at [facility].


She acknowledged suicidal intent, and expectation to die, with her overdose.  

She said her suicidal ideation is chronic, but the actual attempt came out of 

very little planning: she felt overwhelmed, and overdosed in minutes.  Of 

concern she did not immediately regret it, and though circumstances resulted in 

her Ex getter her emergency help, she did not congruently self-rescue.


She reluctantly agrees, smiling, with holding off on discharge till we are more 

assured she is safe.








Objective





- Appearance


Appearance: Well Developed/Nourished


Hygiene: Normal


Grooming: Fairly Well Kept





- Behavior


Psychomotor Activities: Normal





- Attitude and Relatedness


Attitude and Relatedness: Superficially Cooperative


Eye Contact: Good





- Speech


Quality: Unpressured


Latencies: Normal


Quantity: Appropriate





- Mood


Patient's Decription of Mood: "Okay"





- Affect


Observed Affect: Non-labile


Affect Consistent with: Dysphoria - mild





- Thought Process


Patient's Thought Process: Coherent, Goal Directed


Thought Content: No Passive Death Wish, No Suicidal Planning, No Homicidal 

Ideation, No Paranoid Ideation





- Sensorium


Experiencing Hallucinations: No, Sensorium is Clear





- Level of Consciousness


Level of Consciousness: Alert





- Impulse Control


Impulse Control: Intact





- Insight and Judgement


Insight and Judgement: Fair





Assessment





- Assessment


Merits Inpatient Hospitalization: For Stabilization, To Initiate Treatment, For 

Ongoing Evaluation, Consolidate Improvements, For Discharge Planning


Inpatient DSM-IV Dx: Depressive disorder


Clinical Impression: 





48 y/o female with history of multiple prior psychiatric hospitalizations, 

suicide attempts, outpatient treatment.  She was admitted due to concern over a 

suicide attempt by overdose, in the setting of increased mood symptoms and 

distress.   The precipitant was multiple stressors.





Stabilizing here.


Safe on checks, free of ongoing active suicidal ideation.


Has continued with depressive symptoms, but with reduced distress.





Medication mgt. continue outpatient regimen of Effexor.


Plan to evaluate with MMPI testing.











Plan





- Plan


Treatment Plan: 


Name: MARILEOS SEVERINO                        


YOB: 1970                        


[id number]


[id number]











Continued Medication Management: Continue Outpt Medication


Medications: 


 Current Medications





Acetaminophen (Tylenol Tab*)  650 mg PO Q4H PRN


   PRN Reason: PAIN or TEMP > 101 F


Al Hydrox/Mg Hydrox/Simethicone (Maalox Plus*)  30 ml PO Q4H PRN


   PRN Reason: INDIGESTION


Cholecalciferol (Vitamin D Tab*)  2,000 units PO BEDTIME [facility]


   Last Admin: 05/22/17 22:39 Dose:  2,000 units


Lisinopril (Prinivil Tab*)  5 mg PO BEDTIME [facility]


   Last Admin: 05/22/17 22:39 Dose:  5 mg


Meclizine HCl (Antivert Tab*)  25 mg PO TID PRN


   PRN Reason: DIZZINESS


Multivitamins (Theragran Tab*)  1 tab PO DAILY [facility]


   Last Admin: 05/23/17 09:39 Dose:  Not Given


Naproxen (Naprosyn Tab*)  500 mg PO BID PRN


   PRN Reason: PAIN


   Last Admin: 05/22/17 17:32 Dose:  500 mg


Ondansetron HCl (Zofran Odt Tab*)  4 mg PO Q6H PRN


   PRN Reason: NAUSEA


   Last Admin: 05/23/17 08:10 Dose:  4 mg


Sitagliptin Phosphate (Januvia (Nf))  25 mg PO DAILY [facility]


   Last Admin: 05/23/17 08:10 Dose:  25 mg


Venlafaxine HCl (Effexor Xr Cap*)  225 mg PO BEDTIME [facility]


   Last Admin: 05/22/17 22:38 Dose:  225 mg











- Discharge Plan


Discharge Plan: Outpatient Follow Up

## 2017-05-24 PROCEDURE — GZHZZZZ GROUP PSYCHOTHERAPY: ICD-10-PCS | Performed by: PSYCHIATRY & NEUROLOGY

## 2017-05-24 RX ADMIN — DOCUSATE SODIUM SCH MG: 100 CAPSULE, LIQUID FILLED ORAL at 20:15

## 2017-05-24 RX ADMIN — DOCUSATE SODIUM SCH MG: 100 CAPSULE, LIQUID FILLED ORAL at 12:27

## 2017-05-24 RX ADMIN — VENLAFAXINE HYDROCHLORIDE SCH MG: 75 CAPSULE, EXTENDED RELEASE ORAL at 20:14

## 2017-05-24 RX ADMIN — SENNOSIDES SCH TAB: 8.6 TABLET, FILM COATED ORAL at 20:15

## 2017-05-24 RX ADMIN — SITAGLIPTIN SCH MG: 25 TABLET, FILM COATED ORAL at 09:01

## 2017-05-24 RX ADMIN — LISINOPRIL SCH MG: 5 TABLET ORAL at 20:14

## 2017-05-24 RX ADMIN — DIPHENHYDRAMINE HYDROCHLORIDE PRN MG: 50 CAPSULE ORAL at 21:03

## 2017-05-24 RX ADMIN — THERA TABS SCH TAB: TAB at 09:01

## 2017-05-24 RX ADMIN — Medication SCH UNITS: at 20:15

## 2017-05-24 NOTE — PN
Subjective





- Subjective


Service Type: 84460 Hosp care 15 min low complexity


Subjective: 





Marielos reports progressive mood improvement.  Denies ongoing death wishes.  


She notes reduced emotional pain, better coping.


She agrees with further care, but says she really hopes to be released by 

weekend for family activities.





She agrees with doing MMPI.


She complained of poor sleep, noting "thinking a lot" at night.


We discussed options, she chose to have Benadryl prn available.











Objective





- Appearance


Appearance: Healthy Appearing


Hygiene: Normal


Grooming: Well Kept





- Behavior


Psychomotor Activities: Normal





- Attitude and Relatedness


Attitude and Relatedness: Cooperative


Eye Contact: Good





- Speech


Quality: Unpressured


Latencies: Normal


Quantity: Appropriate





- Mood


Patient's Decription of Mood: "Okay"





- Affect


Observed Affect: Non-labile


Affect Consistent with: Dysphoria - mild





- Thought Process


Patient's Thought Process: Coherent


Thought Content: No Passive Death Wish, No Suicidal Planning, No Homicidal 

Ideation, No Paranoid Ideation





- Sensorium


Experiencing Hallucinations: No, Sensorium is Clear





- Level of Consciousness


Level of Consciousness: Alert





- Impulse Control


Impulse Control: Intact





- Insight and Judgement


Insight and Judgement: Fair





Assessment





- Assessment


Merits Inpatient Hospitalization: For Stabilization, To Initiate Treatment, For 

Ongoing Evaluation, Consolidate Improvements, For Discharge Planning


Inpatient DSM-IV Dx: Depressive disorder


Clinical Impression: 





48 y/o female with history of multiple prior psychiatric hospitalizations, 

suicide attempts, outpatient treatment.  She was admitted due to concern over a 

suicide attempt by overdose, in the setting of increased mood symptoms and 

distress.   The precipitant was multiple stressors.





Marielos is stabilizing here.


Behaviorally is safe on checks, free of ongoing active suicidal ideation.





Her suicide attempt was of low/intermediate medical seriousness, and moderate 

psychological seriousness (while it was impulsive there was no apparent effort 

to self-rescue).  





Clinically she is improving, and has continued with depressive symptoms, but 

milder, and with reduced distress.





Medication mgt. continues outpatient regimen of Effexor, and provides Benadryl 

prn insomnia.


Plan to evaluate with MMPI testing.





Given progress and status, expect d/c in ~2 days.  











Plan





- Plan


Treatment Plan: 


Name: MARIELOS SEVERINO                        


YOB: 1970                        


[id number]


[id number]











Continued Medication Management: Continue Outpt Medication


Medications: 


 Current Medications





Acetaminophen (Tylenol Tab*)  650 mg PO Q4H PRN


   PRN Reason: PAIN or TEMP > 101 F


Al Hydrox/Mg Hydrox/Simethicone (Maalox Plus*)  30 ml PO Q4H PRN


   PRN Reason: INDIGESTION


Cholecalciferol (Vitamin D Tab*)  2,000 units PO BEDTIME [facility]


   Last Admin: 05/23/17 20:25 Dose:  2,000 units


Lisinopril (Prinivil Tab*)  5 mg PO BEDTIME [facility]


   Last Admin: 05/23/17 20:25 Dose:  5 mg


Meclizine HCl (Antivert Tab*)  25 mg PO TID PRN


   PRN Reason: DIZZINESS


Multivitamins (Theragran Tab*)  1 tab PO DAILY [facility]


   Last Admin: 05/24/17 09:01 Dose:  1 tab


Naproxen (Naprosyn Tab*)  500 mg PO BID PRN


   PRN Reason: PAIN


   Last Admin: 05/22/17 17:32 Dose:  500 mg


Nicotine (Nicotine Inhaler*)  10 mg INH Q2H PRN


   PRN Reason: CRAVING


Ondansetron HCl (Zofran Odt Tab*)  4 mg PO Q6H PRN


   PRN Reason: NAUSEA


   Last Admin: 05/23/17 08:10 Dose:  4 mg


Sitagliptin Phosphate (Januvia (Nf))  25 mg PO DAILY [facility]


   Last Admin: 05/24/17 09:01 Dose:  25 mg


Venlafaxine HCl (Effexor Xr Cap*)  225 mg PO BEDTIME [facility]


   Last Admin: 05/23/17 20:25 Dose:  225 mg











- Discharge Plan


Discharge Plan: Outpatient Follow Up

## 2017-05-24 NOTE — PN
[facility]: Group Therapy Note





- Service Type


Service Type: 54979 Group Psychotherapy - Cognitive Behavioral Group Therapy (

CBT):Patient was attentive and participatory in CBT programming this morning, 

and remained in good behavioral control.  Patient expressed positive insights 

regarding relevant treatment interventions and goals.

## 2017-05-25 RX ADMIN — DIPHENHYDRAMINE HYDROCHLORIDE PRN MG: 50 CAPSULE ORAL at 20:16

## 2017-05-25 RX ADMIN — THERA TABS SCH TAB: TAB at 08:29

## 2017-05-25 RX ADMIN — Medication SCH UNITS: at 20:15

## 2017-05-25 RX ADMIN — DOCUSATE SODIUM SCH MG: 100 CAPSULE, LIQUID FILLED ORAL at 20:15

## 2017-05-25 RX ADMIN — SENNOSIDES SCH TAB: 8.6 TABLET, FILM COATED ORAL at 20:15

## 2017-05-25 RX ADMIN — VENLAFAXINE HYDROCHLORIDE SCH MG: 75 CAPSULE, EXTENDED RELEASE ORAL at 20:16

## 2017-05-25 RX ADMIN — LISINOPRIL SCH MG: 5 TABLET ORAL at 20:15

## 2017-05-25 RX ADMIN — DOCUSATE SODIUM SCH MG: 100 CAPSULE, LIQUID FILLED ORAL at 08:29

## 2017-05-25 RX ADMIN — SITAGLIPTIN SCH MG: 25 TABLET, FILM COATED ORAL at 08:29

## 2017-05-25 NOTE — PN
Subjective





- Subjective


Service Type: 76688 Hosp care 15 min low complexity


Subjective: 





Marielos reports further progress, with no setbacks.  Still feels down and 

anxious at times, but denies un-manageable pain or suicidal thoughts. 


Still wants to plan d/c for tomorrow.  Sleep was middling overnight - latency 

was short, but she had awakening(s).











Objective





- Appearance


Appearance: Well Developed/Nourished


Hygiene: Normal


Grooming: Well Kept





- Behavior


Psychomotor Activities: Normal





- Attitude and Relatedness


Attitude and Relatedness: Cooperative


Eye Contact: Good





- Speech


Quality: Unpressured


Latencies: Normal


Quantity: Terse





- Mood


Patient's Decription of Mood: "Fine"





- Affect


Observed Affect: Non-labile


Affect Consistent with: Dysphoria - mild





- Thought Process


Patient's Thought Process: Coherent, Goal Directed


Thought Content: No Passive Death Wish, No Suicidal Planning, No Homicidal 

Ideation, No Paranoid Ideation





- Sensorium


Experiencing Hallucinations: No, Sensorium is Clear





- Level of Consciousness


Level of Consciousness: Alert





- Impulse Control


Impulse Control: Intact





- Insight and Judgement


Insight and Judgement: Fair





Assessment





- Assessment


Merits Inpatient Hospitalization: To Initiate Treatment, For Ongoing Evaluation

, Consolidate Improvements, For Discharge Planning


Inpatient DSM-IV Dx: Depressive disorder


Clinical Impression: 





46 y/o female with history of multiple prior psychiatric hospitalizations, 

suicide attempts, outpatient treatment.  She was admitted due to concern over a 

suicide attempt by overdose, in the setting of increased mood symptoms and 

distress.   The precipitant was multiple stressors.





Marielos has stabilized here.


Behaviorally she is consistently safe on checks, and free of ongoing active 

suicidal ideation.


Clinically she is improving, with milder depressive symptoms, reduced distress 

and anxiety, and improved subjective coping.





Medication mgt. continues outpatient regimen of Effexor, and provides Benadryl 

prn insomnia.


Psychological testing with MMPI was correlated clinically - see Dr. Sim's 

consultation note.





Given progress and status, expect d/c tomorrow.


Risk concerns center on suicide risk.  Marielos's suicide attempt was of low/

intermediate medical seriousness, and moderate psychological seriousness (while 

it was impulsive there was no apparent effort to self-rescue).   Acute risk is 

reduced on the basis of her milder symptom burden, improved coping.

















Plan





- Plan


Treatment Plan: 


Name: MARIELOS SEVERINO                        


YOB: 1970                        


[id number]


[id number]











Continued Medication Management: Continue Outpt Medication


Medications: 


 Current Medications





Acetaminophen (Tylenol Tab*)  650 mg PO Q4H PRN


   PRN Reason: PAIN or TEMP > 101 F


Al Hydrox/Mg Hydrox/Simethicone (Maalox Plus*)  30 ml PO Q4H PRN


   PRN Reason: INDIGESTION


Cholecalciferol (Vitamin D Tab*)  [facility]


   Last Admin: 05/24/17 20:15 Dose:  2,000 units


Diphenhydramine HCl (Benadryl Po*)  50 mg PO BEDTIME PRN


   PRN Reason: INSOMNIA


   Last Admin: 05/24/17 21:03 Dose:  50 mg


Docusate Sodium (Colace Cap*)  100 mg PO BID [facility]


   Last Admin: 05/25/17 08:29 Dose:  100 mg


Lisinopril (Prinivil Tab*)  5 mg PO BEDTIME [facility]


   Last Admin: 05/24/17 20:14 Dose:  5 mg


Meclizine HCl (Antivert Tab*)  25 mg PO TID PRN


   PRN Reason: DIZZINESS


Multivitamins (Theragran Tab*)  1 tab PO DAILY [facility]


   Last Admin: 05/25/17 08:29 Dose:  1 tab


Naproxen (Naprosyn Tab*)  500 mg PO BID PRN


   PRN Reason: PAIN


   Last Admin: 05/22/17 17:32 Dose:  500 mg


Nicotine (Nicotine Inhaler*)  10 mg INH Q2H PRN


   PRN Reason: CRAVING


Ondansetron HCl (Zofran Odt Tab*)  4 mg PO Q6H PRN


   PRN Reason: NAUSEA


   Last Admin: 05/23/17 08:10 Dose:  4 mg


Senna (Senokot Tab*)  2 tab PO [facility]EDTIME [facility]


   Last Admin: 05/24/17 20:15 Dose:  2 tab


Sitagliptin Phosphate (Januvia (Nf))  25 mg PO DAILY [facility]


   Last Admin: 05/25/17 08:29 Dose:  25 mg


Venlafaxine HCl (Effexor Xr Cap*)  225 mg PO BEDTIME [facility]


   Last Admin: 05/24/17 20:14 Dose:  225 mg











- Discharge Plan


Discharge Plan: Outpatient Follow Up


Outpatient Program: [facility]

## 2017-05-26 VITALS — DIASTOLIC BLOOD PRESSURE: 75 MMHG | SYSTOLIC BLOOD PRESSURE: 120 MMHG

## 2017-05-26 RX ADMIN — SITAGLIPTIN SCH MG: 25 TABLET, FILM COATED ORAL at 08:26

## 2017-05-26 RX ADMIN — DOCUSATE SODIUM SCH MG: 100 CAPSULE, LIQUID FILLED ORAL at 08:26

## 2017-05-26 RX ADMIN — THERA TABS SCH TAB: TAB at 08:26

## 2017-05-26 NOTE — DS
Subjective





- Subjective


Service Types: [address] [facility] Day Mgmt simple under 30 min


Discharge Date: 05/26/17


Subjective: 





Marielos remained eager for release today.


She affirms she is safe, and feels good about being alive.


She said programming was helpful and that her coping has really improved.


She denied emotional pain, and mood and outlook are "good."


Not noted ongoing poor subjective sleep, which contributes to anxiety.


She declined further inpatient care.





We discussed her regiment and reconciled her home medication - she confirmed 

not taking Cymbalta - she asked about resuming Valium use, and I advised 

against it, noting it's risks.   She said she sees no barriers to routine 

clinical care, or emergency help here if needed again.











Objective





- Appearance


Appearance: Healthy Appearing


Hygiene: Normal


Grooming: Well Kept





- Behavior


Psychomotor Activities: Normal





- Attitude and Relatedness


Attitude and Relatedness: Appropriate


Eye Contact: Good





- Speech


Quality: Unpressured


Latencies: Normal


Quantity: Appropriate





- Mood


Patient's Decription of Mood: "Fine"





- Affect


Observed Affect: Non-labile


Affect Consistent with: Euthymia





Treatment Course & Assessment


Clinical Course & Impression: 





48 y/o female with history of multiple prior psychiatric hospitalizations, 

suicide attempts, outpatient treatment.  She was admitted due to concern over a 

suicide attempt by overdose, in the setting of increased mood symptoms and 

distress.   The precipitant was multiple stressors.





5/26/17:  Clear for release.





Marielos stabilized here.


Behaviorally she was consistently safe on checks, and free of ongoing active 

suicidal ideation.


Clinically she has made progressive improvements, with substantially milder 

depressive symptoms, markedly reduced distress and anxiety, and improved 

subjective coping.





Medication mgt. continued her outpatient regimen of Effexor, and provided 

Benadryl prn insomnia.


Psychological testing with MMPI was correlated clinically - see Dr. Sim's 

consultation note.





Given progress, preference, and status, she is appropriate for outpatient care.


Risk concerns center on suicide risk.  Marielos's suicide attempt was of low/

intermediate medical seriousness, and moderate psychological seriousness (while 

it was impulsive there was no apparent effort to self-rescue).  Acute risk is 

reduced an assessed as low on the basis of her milder symptom burden, improved 

coping, and absence of impairment.  She has multiple factors in her profile 

that elevate her baseline chronic risk for suicide.














Clear for Discharge: Adequate Clinical Respons, Acceptable Safety Profile, Low 

Utility of Inpt Care


Inpatient DSM-IV Dx: Depressive disorder





Discharge Planning





- Discharge Planning


Discharge Plan: Outpatient Follow Up


Outpatient Program: [facility]


Recommendations for Continuing Care: Medication Management, Psychotherapy


Medications: 


 Current Medications


 


Cholecalciferol (Vitamin D Tab*)  2,000 units PO BEDTIME [facility]


   Last Admin: 05/25/17 20:15 Dose:  2,000 units


Docusate Sodium (Colace Cap*)  100 mg PO BID [facility]


   Last Admin: 05/26/17 08:26 Dose:  100 mg


Lisinopril (Prinivil Tab*)  5 mg PO BEDTIME [facility]


   Last Admin: 05/25/17 20:15 Dose:  5 mg


Meclizine HCl (Antivert Tab*)  25 mg PO TID PRN


   PRN Reason: DIZZINESS


Naproxen (Naprosyn Tab*)  500 mg PO BID PRN


   PRN Reason: PAIN


   Last Admin: 05/22/17 17:32 Dose:  500 mg


Senna (Senokot Tab*)  2 tab PO BEDTIME [facility]


   Last Admin: 05/25/17 20:15 Dose:  2 tab


Sitagliptin Phosphate (Januvia (Nf))  25 mg PO DAILY [facility]


   Last Admin: 05/26/17 08:26 Dose:  25 mg


Venlafaxine HCl (Effexor Xr Cap*)  225 mg PO BEDTIME [facility]


   Last Admin: 05/25/17 20:16 Dose:  225 mg








Discharge Planning: 


Prescriptions provided for discharge                  [X] Yes  colace, senna , 

venlafaxine er





Follow up care details as per social work arrangements.


Patient response to discharge plan:   


                                                                 [X] eager for 

discharge


                                    [] agreeable with discharge plan


                                  [] ambivalent about discharge


                                   [] disagrees with discharge today

## 2017-10-12 ENCOUNTER — HOSPITAL ENCOUNTER (EMERGENCY)
Dept: HOSPITAL 25 - ED | Age: 47
Discharge: HOME | End: 2017-10-12
Payer: COMMERCIAL

## 2017-10-12 VITALS — SYSTOLIC BLOOD PRESSURE: 146 MMHG | DIASTOLIC BLOOD PRESSURE: 83 MMHG

## 2017-10-12 DIAGNOSIS — Z90.49: ICD-10-CM

## 2017-10-12 DIAGNOSIS — G43.909: ICD-10-CM

## 2017-10-12 DIAGNOSIS — Z88.6: ICD-10-CM

## 2017-10-12 DIAGNOSIS — M54.31: Primary | ICD-10-CM

## 2017-10-12 DIAGNOSIS — E11.9: ICD-10-CM

## 2017-10-12 DIAGNOSIS — I10: ICD-10-CM

## 2017-10-12 DIAGNOSIS — Z86.14: ICD-10-CM

## 2017-10-12 DIAGNOSIS — Z90.711: ICD-10-CM

## 2017-10-12 DIAGNOSIS — F41.0: ICD-10-CM

## 2017-10-12 DIAGNOSIS — M79.7: ICD-10-CM

## 2017-10-12 DIAGNOSIS — F32.9: ICD-10-CM

## 2017-10-12 DIAGNOSIS — J45.909: ICD-10-CM

## 2017-10-12 PROCEDURE — 99281 EMR DPT VST MAYX REQ PHY/QHP: CPT

## 2017-10-12 PROCEDURE — 72170 X-RAY EXAM OF PELVIS: CPT

## 2017-10-12 NOTE — RAD
HISTORY: SI joint pain, right



COMPARISONS: February 19, 2010



VIEWS: 1, Single frontal view of the pelvis    



FINDINGS:



BONE DENSITY: Normal.

BONES: There is no displaced fracture.    

JOINTS: There is no arthropathy.    

ALIGNMENT: There is no dislocation. 

SOFT TISSUES: Unremarkable.



OTHER FINDINGS: None.



IMPRESSION: 

NO ACUTE OSSEOUS INJURY. IF SYMPTOMS PERSIST, RECOMMEND REPEAT IMAGING.

## 2017-10-12 NOTE — ED
Back Pain





- HPI Summary


HPI Summary: 


right side groin and SI joint pain--for a couple of days   no known injury---

taking usual pain meds with some relief did get an injection a pain clinic and 

had tried a Lidoderm patch








- History of Current Complaint


Chief Complaint: EDBackInjuryPain


Stated Complaint: RT SIDE PAIN/DIFF WALKING


Time Seen by Provider: 10/12/17 13:45


Hx Obtained From: Patient


Onset/Duration: Sudden Onset, Lasting Days, Still Present


Timing: Constant


Back Pain Location: Is Discrete @ - right SI Joint and Groin


Severity Initially: Severe


Severity Currently: Severe


Pain Intensity: 10


Character: Sharp, Aching, Spasmodic


Aggravating Symptom(s): Movement


Associated Signs And Symptoms: Positive: Negative


Related History: Similar Episode Dx As - does have a history of chronic pain





- Allergies/Home Medications


Allergies/Adverse Reactions: 


 Allergies











Allergy/AdvReac Type Severity Reaction Status Date / Time


 


Ibuprofen [From Motrin] AdvReac Intermediate Abdominal Verified 05/22/17 09:40





   Pain  














PMH/Surg Hx/FS Hx/Imm Hx


Previously Healthy: No


Endocrine/Hematology History: Reports: Hx Diabetes


   Denies: Hx Anemia


Cardiovascular History: Reports: Hx Hypertension


   Denies: Hx Congestive Heart Failure, Hx Pacemaker/ICD


Respiratory History: Reports: Hx Asthma


   Denies: Hx Chronic Obstructive Pulmonary Disease (COPD)


GI History: Reports: Other GI Disorders - patient states chronic nausea


   Denies: Hx Jaundice


 History: 


   Denies: Hx Dialysis, Hx Renal Disease


Musculoskeletal History: Reports: Hx Arthritis, Hx Back Problems, Hx 

Fibromyalgia


   Comment Only: Other Musculoskeletal History - fibromyalgia


Sensory History: Reports: Hx Contacts or Glasses


   Denies: Hx Hearing Aid


Opthamlomology History: Reports: Hx Contacts or Glasses


Neurological History: Reports: Hx Migraine, Hx Nerve Disease - fibromyalgia, 

Other Neuro Impairments/Disorders - vertigo


Psychiatric History: Reports: Hx Anxiety, Hx Depression, Hx Panic Disorder - 

ANXIETY, Hx Suicide Attempt


   Denies: Hx Eating Disorder





- Cancer History


Hx Chemotherapy: No


Hx Radiation Therapy: No





- Surgical History


Surgery Procedure, Year, and Place: PARTIAL HYSTERECTOMY 3/2000, TUBAL 1997, 

GALLBLADDER 1995





- Immunization History


Date of Tetanus Vaccine: Unk


Date of Influenza Vaccine: None Fall 2014


Infectious Disease History: Yes


Infectious Disease History: Reports: Hx of Known/Suspected MRSA


   Denies: Traveled Outside the [place] in Last 30 Days





- Family History


Known Family History: Positive: None - reviewed & noncontributory


   Negative: Cardiac Disease, Hypertension, Diabetes, Other - negative FHx of 

breast CA





- Social History


Occupation: Unemployed


Lives: With Family


Alcohol Use: None


Hx Substance Use: No


Substance Use Type: Reports: None


Substance Use Comment - Amount & Last Used: Unknown


Hx Tobacco Use: No


Smoking Status (MU): Never Smoked Tobacco


Have You Smoked in the Last Year: No





Review of Systems


Constitutional: Negative


Eyes: Negative


ENT: Negative


Cardiovascular: Negative


Respiratory: Negative


Gastrointestinal: Negative


Genitourinary: Negative


Positive: Arthralgia - right si and groin


Skin: Negative


Neurological: Negative


Psychological: Normal


All Other Systems Reviewed And Are Negative: Yes





Physical Exam


Triage Information Reviewed: Yes


Vital Signs On Initial Exam: 


 Initial Vitals











Temp Pulse Resp BP Pulse Ox


 


 97.9 F   85   16   146/83   100 


 


 10/12/17 13:32  10/12/17 13:32  10/12/17 13:32  10/12/17 13:32  10/12/17 13:32











Vital Signs Reviewed: Yes


Appearance: Positive: Well-Appearing, No Pain Distress - mild, Well-Nourished


Skin: Positive: Warm, Skin Color Reflects Adequate Perfusion


Head/Face: Positive: Normal Head/Face Inspection


Eyes: Positive: Normal, Conjunctiva Clear


ENT: Positive: Normal ENT inspection, Hearing grossly normal.  Negative: 

Tonsillar swelling, Tonsillar exudate, Trismus, Muffled/hoarse voice


Neck: Positive: Supple, Nontender, No Lymphadenopathy


Respiratory/Lung Sounds: Positive: Clear to Auscultation, Breath Sounds Present


Cardiovascular: Positive: Normal, RRR


Musculoskeletal: Positive: Normal, Strength/ROM Intact - right hip


Neurological: Positive: Normal, Sensory/Motor Intact, Alert, Oriented to Person 

Place, Time


Psychiatric: Positive: Normal


AVPU Assessment: Alert





- Corey Coma Scale


Best Eye Response: 4 - Spontaneous


Best Motor Response: 6 - Obeys Commands


Best Verbal Response: 5 - Oriented





Diagnostics





- Vital Signs


 Vital Signs











  Temp Pulse Resp BP Pulse Ox


 


 10/12/17 13:32  97.9 F  85  16  146/83  100














- Laboratory


Lab Statement: Any lab studies that have been ordered have been reviewed, and 

results considered in the medical decision making process.





- Radiology


  ** No standard instances


Xray Interpretation: No Acute Changes


Radiology Interpretation Completed By: Radiologist





Back Pain Course/Dx





- Course


Assessment/Plan: continue care with pain clinic  zanaflex may be helpful in 

managing the muscle pain---follow at pain clinic and with primary care MD





- Diagnoses


Provider Diagnoses: 


 Sciatic pain








Discharge





- Discharge Plan


Condition: Good


Disposition: HOME


Prescriptions: 


tiZANidine TAB* [Zanaflex TAB*] 2 mg PO TID PRN #20 tab


 PRN Reason: muscular pain


Patient Education Materials:  Hypertension (ED), Chronic Back Pain (ED)


Referrals: 


Lin Lima MD [Primary Care Provider] - 1 Week

## 2017-10-19 ENCOUNTER — HOSPITAL ENCOUNTER (OUTPATIENT)
Dept: HOSPITAL 25 - ED | Age: 47
Setting detail: OBSERVATION
LOS: 1 days | Discharge: HOME | End: 2017-10-20
Attending: HOSPITALIST | Admitting: HOSPITALIST
Payer: COMMERCIAL

## 2017-10-19 DIAGNOSIS — R07.9: Primary | ICD-10-CM

## 2017-10-19 DIAGNOSIS — R61: ICD-10-CM

## 2017-10-19 DIAGNOSIS — Z88.8: ICD-10-CM

## 2017-10-19 DIAGNOSIS — R06.02: ICD-10-CM

## 2017-10-19 DIAGNOSIS — Z79.899: ICD-10-CM

## 2017-10-19 DIAGNOSIS — R11.0: ICD-10-CM

## 2017-10-19 LAB
ALBUMIN SERPL BCG-MCNC: 3.8 G/DL (ref 3.2–5.2)
ALP SERPL-CCNC: 63 U/L (ref 34–104)
ALT SERPL W P-5'-P-CCNC: 21 U/L (ref 7–52)
ANION GAP SERPL CALC-SCNC: 7 MMOL/L (ref 2–11)
AST SERPL-CCNC: 15 U/L (ref 13–39)
BUN SERPL-MCNC: 15 MG/DL (ref 6–24)
BUN/CREAT SERPL: 24.2 (ref 8–20)
CALCIUM SERPL-MCNC: 9.2 MG/DL (ref 8.6–10.3)
CHLORIDE SERPL-SCNC: 103 MMOL/L (ref 101–111)
CK SERPL-CCNC: 25 U/L (ref 10–223)
GLOBULIN SER CALC-MCNC: 3.5 G/DL (ref 2–4)
GLUCOSE SERPL-MCNC: 91 MG/DL (ref 70–100)
HCO3 SERPL-SCNC: 26 MMOL/L (ref 22–32)
HCT VFR BLD AUTO: 38 % (ref 35–47)
HGB BLD-MCNC: 12.9 G/DL (ref 12–16)
MAGNESIUM SERPL-MCNC: 2.1 MG/DL (ref 1.9–2.7)
MCH RBC QN AUTO: 30 PG (ref 27–31)
MCHC RBC AUTO-ENTMCNC: 34 G/DL (ref 31–36)
MCV RBC AUTO: 89 FL (ref 80–97)
POTASSIUM SERPL-SCNC: 3.5 MMOL/L (ref 3.5–5)
PROT SERPL-MCNC: 7.3 G/DL (ref 6.4–8.9)
RBC # BLD AUTO: 4.29 10^6/UL (ref 4–5.4)
SODIUM SERPL-SCNC: 136 MMOL/L (ref 133–145)
TROPONIN I SERPL-MCNC: 0 NG/ML (ref ?–0.04)
TSH SERPL-ACNC: 3.1 MCIU/ML (ref 0.34–5.6)
WBC # BLD AUTO: 10.2 10^3/UL (ref 3.5–10.8)

## 2017-10-19 PROCEDURE — 80053 COMPREHEN METABOLIC PANEL: CPT

## 2017-10-19 PROCEDURE — 36415 COLL VENOUS BLD VENIPUNCTURE: CPT

## 2017-10-19 PROCEDURE — 96372 THER/PROPH/DIAG INJ SC/IM: CPT

## 2017-10-19 PROCEDURE — 71020: CPT

## 2017-10-19 PROCEDURE — 83605 ASSAY OF LACTIC ACID: CPT

## 2017-10-19 PROCEDURE — 82553 CREATINE MB FRACTION: CPT

## 2017-10-19 PROCEDURE — 96361 HYDRATE IV INFUSION ADD-ON: CPT

## 2017-10-19 PROCEDURE — 99283 EMERGENCY DEPT VISIT LOW MDM: CPT

## 2017-10-19 PROCEDURE — G0378 HOSPITAL OBSERVATION PER HR: HCPCS

## 2017-10-19 PROCEDURE — 84443 ASSAY THYROID STIM HORMONE: CPT

## 2017-10-19 PROCEDURE — 85025 COMPLETE CBC W/AUTO DIFF WBC: CPT

## 2017-10-19 PROCEDURE — 87040 BLOOD CULTURE FOR BACTERIA: CPT

## 2017-10-19 PROCEDURE — 83735 ASSAY OF MAGNESIUM: CPT

## 2017-10-19 PROCEDURE — 80048 BASIC METABOLIC PNL TOTAL CA: CPT

## 2017-10-19 PROCEDURE — 93005 ELECTROCARDIOGRAM TRACING: CPT

## 2017-10-19 PROCEDURE — 83880 ASSAY OF NATRIURETIC PEPTIDE: CPT

## 2017-10-19 PROCEDURE — 82550 ASSAY OF CK (CPK): CPT

## 2017-10-19 PROCEDURE — 84484 ASSAY OF TROPONIN QUANT: CPT

## 2017-10-19 PROCEDURE — 96374 THER/PROPH/DIAG INJ IV PUSH: CPT

## 2017-10-19 RX ADMIN — NITROGLYCERIN PRN MG: 0.4 TABLET SUBLINGUAL at 16:53

## 2017-10-19 RX ADMIN — ATORVASTATIN CALCIUM SCH MG: 80 TABLET, FILM COATED ORAL at 21:31

## 2017-10-19 RX ADMIN — HEPARIN SODIUM SCH UNITS: 5000 INJECTION INTRAVENOUS; SUBCUTANEOUS at 21:36

## 2017-10-19 RX ADMIN — INSULIN LISPRO SCH: 100 INJECTION, SOLUTION INTRAVENOUS; SUBCUTANEOUS at 21:23

## 2017-10-19 RX ADMIN — NITROGLYCERIN PRN MG: 0.4 TABLET SUBLINGUAL at 17:07

## 2017-10-19 RX ADMIN — NITROGLYCERIN PRN MG: 0.4 TABLET SUBLINGUAL at 17:19

## 2017-10-19 RX ADMIN — ATORVASTATIN CALCIUM SCH MG: 80 TABLET, FILM COATED ORAL at 22:06

## 2017-10-19 NOTE — RAD
Indication: Chest pain.



2 views of the chest including dual energy PA views are reviewed. No mediastinal shift is

noted. Heart is of normal size and configuration. Lung fields are clear. When compared to

previous exam of February 20, 2017 no significant change is noted.



IMPRESSION: No active cardiopulmonary disease is noted.

## 2017-10-20 VITALS — DIASTOLIC BLOOD PRESSURE: 75 MMHG | SYSTOLIC BLOOD PRESSURE: 129 MMHG

## 2017-10-20 LAB
ANION GAP SERPL CALC-SCNC: 3 MMOL/L (ref 2–11)
BUN SERPL-MCNC: 10 MG/DL (ref 6–24)
BUN/CREAT SERPL: 16.4 (ref 8–20)
CALCIUM SERPL-MCNC: 8.9 MG/DL (ref 8.6–10.3)
CHLORIDE SERPL-SCNC: 103 MMOL/L (ref 101–111)
GLUCOSE SERPL-MCNC: 101 MG/DL (ref 70–100)
HCO3 SERPL-SCNC: 29 MMOL/L (ref 22–32)
POTASSIUM SERPL-SCNC: 3.8 MMOL/L (ref 3.5–5)
SODIUM SERPL-SCNC: 135 MMOL/L (ref 133–145)
TROPONIN I SERPL-MCNC: 0 NG/ML (ref ?–0.04)

## 2017-10-20 RX ADMIN — INSULIN LISPRO SCH: 100 INJECTION, SOLUTION INTRAVENOUS; SUBCUTANEOUS at 08:14

## 2017-10-20 RX ADMIN — HEPARIN SODIUM SCH UNITS: 5000 INJECTION INTRAVENOUS; SUBCUTANEOUS at 06:17

## 2017-10-20 NOTE — HP
HISTORY AND PHYSICAL:

 

DATE OF ADMISSION:  10/19/17

 

PRIMARY CARE PROVIDER:  Lin Lima MD

 

ADMITTING PROVIDER:  Felipe Hendricks MD

 

CHIEF COMPLAINT:  Chest pressure, nausea, diaphoresis, shortness of breath.

 

HISTORY OF PRESENT ILLNESS:  The patient is a 47-year-old female with a past 
medical history of non-insulin dependent diabetes mellitus, hypertension, 
anxiety, depression with 2 admissions for suicidal ideation in  and , 
thyroid nodule and fibromyalgia who was doing some housework at 1430 on the day 
of admission when she started to have left-sided chest pressure, worse with 
deep breaths.  She had sensations of hot and cold.  She felt nauseous secondary 
to sensation of mucous in her throat.  She was diaphoretic and had some 
shortness of breath.  She has followed with Dr. Abel at [facility] and reports that she had a stress test and echocardiogram around 
2 years ago, reportedly normal, not available in our system.  She follows with 
him for hypertension.  She was brought to [facility] Emergency Room.
  She received nitroglycerin 0.4 mg sublingual q.5 minutes x3 with resolution 
of her chest pain per Dr. Romano.  The patient also received aspirin, do not see 
documented in the EMR, unclear if that was given with EMS.  She was 
hypertensive on arrival, 177/105.  She got metoprolol tartrate 25 mg p.o. and 
blood pressures are now in the low 110s to 106/61 latest. The patient states 
symptoms have now returned with 6/10 pressure, but no other symptoms except for 
some still pleuritic pain with deep breathing.  EKG demonstrated T-wave 
inversions in V1, otherwise normal sinus rhythm.  Heart rate 95, normal axis, 
normal intervals.  No ST depressions or elevations.  . Chest x-ray was 
obtained, which showed no acute intrathoracic process.  The patient will be 
admitted to for ACS rule out given her diabetes and ongoing symptoms.

 

MEDICATIONS:  Include:

 

1.  Effexor 225 mg q.h.s.

2.  Gabapentin 600 mg q.p.m., 300 mg q.a.m., and 300 mg at 1400.

3.  Lisinopril 5 mg q.p.m.

4.  Vitamin D 2000 units p.o. daily.

5.  Oxycodone 15 mg tablets p.o. t.i.d. for fibromyalgia pain.

6.  Senokot 2 tabs p.o. at bedtime p.r.n.

7.  Meclizine 25 mg p.o. t.i.d. p.r.n. (of note, she says she has not needed 
this medication since starting Effexor.)

8.  Docusate 100 mg p.o. b.i.d. p.r.n.

9.  Januvia 25 mg p.o. daily.

 

ALLERGIES:  IBUPROFEN, causes GI upset and vomiting.

 

FAMILY HISTORY:  Mother with asthma.  Dad  around age 60 of unknown causes 
in [facility] [place].

 

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 
sons, has limitations secondary to her fibromyalgia pain and now she has had 
multiple hydrocortisone injections in her hands, feet, pelvis, ankles recently 
for these pains with some relief except in the pelvis area.  She is a never 
smoker, occasional social drinker, though not since starting the Effexor.  She 
does not use any recreational drug use.

 

REVIEW OF SYSTEMS:  A complete 14-point review of systems was negative except 
as per HPI.  The patient attests to chest pressure, diaphoresis, sensation of 
hot and cold, but denies fevers, chills, dysuria, cough, headaches, vision 
changes.  Does attest to nausea, now resolved.  Some mucous or phlegm in the 
mouth and dry lips.

 

                               PHYSICAL EXAMINATION

 

GENERAL:  In no acute distress, lying in \[facility] [place] [facility]\"".

 

VITAL SIGNS:  Blood pressure 106/91, satting 97% on room air, respiratory rate 
16, heart rate 72.

 

HEENT:  Normocephalic, atraumatic.  Pupils are equal, round, and reactive to 
light. Extraocular motions intact.

 

NECK:  Supple.  No cervical lymphadenopathy.

 

PULMONARY:  Clear to auscultation bilaterally with no wheezing, rales or 
rhonchi.

 

CARDIOVASCULAR:  Regular rate and rhythm with no murmurs, rubs, or gallops.

 

ABDOMEN:  Soft, nontender, nondistended.  No guarding.  No peritoneal signs.

 

EXTREMITIES:  Warm, well perfused.  No peripheral edema.

 

NEUROLOGIC:  Cranial nerves II through XII grossly intact, moving all 
extremities. Sensation intact.

 

SKIN:  No lesions, no ulcers noted.

 

 DIAGNOSTIC STUDIES/LAB DATA:  White count 10.2, hemoglobin 12.9, hematocrit 38
, platelets 414.  Sodium 136, potassium 3.5, chloride 103, carbon dioxide 26, 
BUN 15, creatinine 0.62, lactic acid 1.3, magnesium 2.1.  LFTs within normal 
limits. Troponin 0.00.  BNP 47.  TSH 3.10.

 

IMAGING:  Chest x-ray, no active cardiopulmonary disease noted.

 

ASSESSMENT AND PLAN:  The patient is a 47-year-old female with past medical 
history of diabetes, severe anxiety, depression, now seemingly better 
controlled on Effexor with history of suicidal ideation, fibromyalgia and 
chronic pain syndrome who presents with acute left-sided chest pressure, nausea
, diaphoresis.  Initial EKG with only T-wave inversions in V1 and negative 
initial troponins.  She will be admitted to observation unit for acute coronary 
syndrome rule out.  Give her morphine 2 mg IV q.2 hours p.r.n. and 
nitroglycerin sublingual tabs q.5 minutes. If chest pain does not resolve with 
these, placement on nitro drip and starting of heparin drip to be considered.  
We will continue on telemetry monitoring.  For her fibromyalgia, continue her 
gabapentin 600 q.p.m., 300 q.a.m. and 300 q. afternoon. For diabetes, we will 
hold her Januvia, get sliding scale insulin with lispro and point-of-care 
testing q.a.c. q.h.s.  For her major depressive disorder, continue her Effexor 
225 mg p.m.  For her hypertension, she is now on the low side with latest 
systolics in the 105 region.  We will hold her lisinopril right now knowing 
that we may have to continue to give her some morphine and other nitro, but low 
threshold to restart tomorrow her lisinopril 5 mg.  She will be n.p.o. 
initially until her troponins are negative x3.  We will get them every 3 hours.
  She is a full code.  Her medical surrogate is her daughter, Raisa Leyva.

 

 

 

[id number]/CPS #: [id number]

[facility]

## 2017-10-21 NOTE — DS
DISCHARGE SUMMARY:

 

DATE OF ADMISSION:  10/19/17

 

DATE OF DISCHARGE:  10/20/17

 

ADMITTING PROVIDER:  Felipe Hendricks MD

 

ATTENDING PROVIDER:  Felipe Hendricks MD

 

PRIMARY CARE PROVIDER:  Lin Lima MD

 

CHIEF COMPLAINT:  Chest pressure, nausea, diaphoresis, shortness of breath.

 

PRINCIPAL DIAGNOSIS:  Acute coronary syndrome, rule out.

 

HISTORY OF PRESENT ILLNESS AND HOSPITAL COURSE:  The patient is a 47-year-old 
female with past medical history of noninsulin-dependent diabetes mellitus, 
last A1c 5.8% on 10/03/17, hypertension, anxiety, depression, with history of 2 
psychiatric admissions in 89 and 95, history of suicidal ideations, thyroid 
nodule, fibromyalgia, who had some left-sided chest pressure, worse with deep 
breath that occurred while doing some housework at 1430 on the day of 
admission.  She was diaphoretic, short of breath.  She has a history of seeing 
Dr. Abel of [facility], had reportedly a stress test 
and echocardiogram around 2 years ago, which the patient says reported normal, 
though not available in our system.  She had relief of her symptoms initially 
with nitroglycerin 0.4 mg sublingual tab for 3 times in the emergency room.  
Pressure had been 8/10 at worse. On reevaluation, the patient's pressure had 
returned, 6/10.  The patient was given aspirin 325 mg at that time, admitted 
for ACS rule out.  EKG demonstrated only T- wave inversion in V1, no other 
changes and in fact, the T-wave inversions were present prior in May 2017, 
stable. No arrythmias on telemetry, chest pain resolved.  The patient was noted 
to have elevated LDL at 148, HDL was 68, and total cholesterol was 228, these 
were from 10/03/17.  She was also initially hypertensive to the 177/105 range.  
She had risk stratification putting her at 10-year risk for ASCVD risk score of 
4.8%, was started on moderate dose statin of Lipitor 10 mg daily. She was given 
prescription for sublingual nitroglycerin 0.4 mg sublingual q.5 hours p.r.n. 
for chest pain.  No other changes in her medications were made.  She will 
follow up with Dr. Abel for consideration of outpatient stress test.

 

DISCHARGE MEDICATIONS:  Include:

 

1.  Atorvastatin 10 mg q.h.s. (new).

2.  Diazepam 5 mg p.o. daily p.r.n.

3.  Cholecalciferol 2000 units p.o. daily.

4.  Docusate 100 mg p.o. b.i.d.

5.  Gabapentin 600 mg q.p.m., 300 mg q.a.m., 300 mg 1400 daily for her 
fibromyalgia.

6.  Lisinopril 5 mg q.h.s.

7.  Meclizine 25 mg p.o. t.i.d., though of note has not been using this 
recently after starting Effexor.

8.  Senna tab 2 tabs p.o. at bedtime.

9.  Januvia 25 mg p.o. daily.

10.  Venlafaxine (Effexor) 225 mg p.o. at bedtime.

11.  Oxycodone 50 mg p.o. t.i.d.

 

DISCHARGE DIET:  Heart healthy, carbohydrate consistent.

 

ACTIVITY LEVEL:  No restriction.

 

FOLLOWUP:  Follow up with Dr. Lin Lima and Dr. Abel next week at 
[facility].

 

 [id number]/[facility] #: [id number]

[facility]

## 2017-10-21 NOTE — ED
Gerry MOSER Angela, scribed for Austin Romano MD on 10/19/17 at 1627 .





HPI Chest Pain





- HPI Summary


HPI Summary: 





This pt is a 46 y/o female presenting to [facility] c/o sudden onset of chest pain 

1.5 hours PTA today. Pt reports that at onset of her chest pain she was 

cleaning. Pt notes her chest pain is on the left side radiating to her left 

shoulder. She describes the pain as pressure and squeezing. Pt reports nausea 

and chills. She states her cough began today with her chest pain. She denies 

vomiting, SOB, chills, weakness or numbness.


Pt denies MI. Her last stress test was 2 years ago. 


PMHx: HTN, DM.





- History of Current Complaint


Chief Complaint: EDChestPainROMI


Time Seen by Provider: 10/19/17 16:07


Hx Obtained From: Patient


Onset/Duration: Started Hours Ago - 1.5 hours PTA


Timing: Constant, Lasting Hours


Current Severity: Severe


Pain Intensity: 8


Pain Scale Used: 0-10 Numeric


Chest Pain Location: Left Anterior


Chest Pain Radiates: Yes


Chest Pain Radiates To:: Shoulder - left shoulder


Character: Pressure/Squeezing


Aggravating Factor(s): Nothing


Alleviating Factor(s): Nothing


Associated Signs and Symptoms: Positive: Chest Pain, Chills, Nausea, Cough, 

Other: - left shoulder pain.  Negative: Shortness of Breath, Fever, Vomiting





- Additional Pertinent History


Primary Care Physician: [id number]





- Allergy/Home Medications


Allergies/Adverse Reactions: 


 Allergies











Allergy/AdvReac Type Severity Reaction Status Date / Time


 


Ibuprofen [From Motrin] AdvReac Intermediate Abdominal Verified 05/22/17 09:40





   Pain  











Home Medications: 


 Home Medications





Cholecalciferol TAB* [Vitamin D TAB*] 2,000 units PO DAILY 10/19/17 [History 

Confirmed 10/19/17]


Diazepam TAB(*) [Valium TAB(*)] 5 mg PO DAILY PRN 10/19/17 [History Confirmed 10

/19/17]


Gabapentin TAB(NF) [Neurontin 600 mg TAB(NF)] 1,200 mg PO TID 10/19/17 [History 

Confirmed 10/19/17]


Meclizine TAB* [Antivert 12.5 TAB*] 25 mg PO TID PRN 10/19/17 [History 

Confirmed 10/19/17]


SitaGLIPtin (NF) [Januvia (NF)] 25 mg PO DAILY 10/19/17 [History Confirmed 10/19

/17]


oxyCODONE TAB* [Roxycodone TAB 5 mg*] 15 mg PO TID MDD 15 mg 10/19/17 [History 

Confirmed 10/19/17]











PMH/Surg Hx/FS Hx/Imm Hx


Endocrine/Hematology History: Reports: Hx Diabetes


   Denies: Hx Anemia


Cardiovascular History: Reports: Hx Hypertension


   Denies: Hx Congestive Heart Failure, Hx Pacemaker/ICD


Respiratory History: Reports: Hx Asthma


   Denies: Hx Chronic Obstructive Pulmonary Disease (COPD)


GI History: Reports: Other GI Disorders - patient states chronic nausea


   Denies: Hx Jaundice


 History: 


   Denies: Hx Dialysis, Hx Renal Disease


Musculoskeletal History: Reports: Hx Arthritis, Hx Back Problems, Hx 

Fibromyalgia


   Comment Only: Other Musculoskeletal History - fibromyalgia


Sensory History: Reports: Hx Contacts or Glasses


   Denies: Hx Hearing Aid


Opthamlomology History: Reports: Hx Contacts or Glasses


Neurological History: Reports: Hx Migraine, Hx Nerve Disease - fibromyalgia, 

Other Neuro Impairments/Disorders - vertigo


Psychiatric History: Reports: Hx Anxiety, Hx Depression, Hx Panic Disorder - 

ANXIETY, Hx Suicide Attempt


   Denies: Hx Eating Disorder





- Cancer History


Hx Chemotherapy: No


Hx Radiation Therapy: No





- Surgical History


Surgery Procedure, Year, and Place: PARTIAL HYSTERECTOMY 3/2000, TUBAL 1997, 

GALLBLADDER 1995





- Immunization History


Date of Tetanus Vaccine: Unk


Date of Influenza Vaccine: None Fall 2014


Infectious Disease History: No


Infectious Disease History: Reports: Hx of Known/Suspected MRSA


   Denies: Traveled Outside the [place] in Last 30 Days





- Family History


Known Family History: Positive: Other - negative FHx of breast CA


   Negative: Cardiac Disease, Hypertension, Diabetes





- Social History


Alcohol Use: None


Hx Substance Use: No


Substance Use Type: Reports: None


Substance Use Comment - Amount & Last Used: Unknown


Hx Tobacco Use: No


Smoking Status (MU): Never Smoked Tobacco


Have You Smoked in the Last Year: No





Review of Systems


Positive: Chills.  Negative: Fever


Positive: Chest Pain


Positive: Cough.  Negative: Shortness Of Breath


Positive: Nausea.  Negative: Vomiting


Positive: Other - left shoulder pain


Skin: Negative


Neurological: Negative


All Other Systems Reviewed And Are Negative: Yes





Physical Exam





- Summary


Physical Exam Summary: 





VITAL SIGNS: Reviewed.


GENERAL: Patient is a well-developed and nourished female who is lying 

comfortable in the stretcher. Patient is not in any acute respiratory distress.


HEAD AND FACE: No signs of trauma. No ecchymosis, hematomas or skull 

depressions. No sinus tenderness.


EYES: PERRLA, EOMI x 2, No injected conjunctiva, no nystagmus.


EARS: Hearing grossly intact. Ear canals and tympanic membranes are within 

normal limits.


MOUTH: Oropharynx within normal limits.


NECK: Supple, trachea is midline, no adenopathy, no JVD, no carotid bruit, no c-

spine tenderness, neck with full ROM.


CHEST: Symmetric, no tenderness at palpation


LUNGS: Clear to auscultation bilaterally. No wheezing or crackles.


CVS: Regular rate and rhythm, S1 and S2 present, no murmurs or gallops 

appreciated.


ABDOMEN: Soft, non-tender. No signs of distention. No rebound no guarding, and 

no masses palpated. Bowel sounds are normal.


EXTREMITIES: FROM in all major joints, no edema, no cyanosis or clubbing.


NEURO: Alert and oriented x 3. No acute neurological deficits. Speech is normal 

and follows commands.


SKIN: Dry and warm


Triage Information Reviewed: Yes


Vital Signs On Initial Exam: 


 Initial Vitals











Temp Pulse Resp BP Pulse Ox


 


 98.1 F   96   16   177/105   99 


 


 10/19/17 16:02  10/19/17 16:02  10/19/17 16:02  10/19/17 16:02  10/19/17 16:02











Vital Signs Reviewed: Yes





Diagnostics





- Vital Signs


 Vital Signs











  Temp Pulse Resp BP Pulse Ox


 


 10/19/17 16:02  98.1 F  96  16  177/105  99














- Laboratory


Lab Results: 


 Lab Results











  10/19/17 10/19/17 10/19/17 Range/Units





  17:04 17:04 17:04 


 


WBC   10.2   (3.5-10.8)  10^3/ul


 


RBC   4.29   (4.0-5.4)  10^6/ul


 


Hgb   12.9   (12.0-16.0)  g/dl


 


Hct   38   (35-47)  %


 


MCV   89   (80-97)  fL


 


MCH   30   (27-31)  pg


 


MCHC   34   (31-36)  g/dl


 


RDW   14   (10.5-15)  %


 


Plt Count   414   (150-450)  10^3/ul


 


MPV   7 L   (7.4-10.4)  um3


 


Neut % (Auto)   73.2   (38-83)  %


 


Lymph % (Auto)   17.5 L   (25-47)  %


 


Mono % (Auto)   8.4   (1-9)  %


 


Eos % (Auto)   0.2   (0-6)  %


 


Baso % (Auto)   0.7   (0-2)  %


 


Absolute Neuts (auto)   7.5   (1.5-7.7)  10^3/ul


 


Absolute Lymphs (auto)   1.8   (1.0-4.8)  10^3/ul


 


Absolute Monos (auto)   0.9 H   (0-0.8)  10^3/ul


 


Absolute Eos (auto)   0   (0-0.6)  10^3/ul


 


Absolute Basos (auto)   0.1   (0-0.2)  10^3/ul


 


Absolute Nucleated RBC   0   10^3/ul


 


Nucleated RBC %   0   


 


Sodium    136  (133-145)  mmol/L


 


Potassium    3.5  (3.5-5.0)  mmol/L


 


Chloride    103  (101-111)  mmol/L


 


Carbon Dioxide    26  (22-32)  mmol/L


 


Anion Gap    7  (2-11)  mmol/L


 


BUN    15  (6-24)  mg/dL


 


Creatinine    0.62  (0.51-0.95)  mg/dL


 


Est GFR ( Amer)    132.1  (>60)  


 


Est GFR (Non-Af Amer)    102.7  (>60)  


 


BUN/Creatinine Ratio    24.2 H  (8-20)  


 


Glucose    91  ()  mg/dL


 


Lactic Acid     (0.5-2.0)  mmol/L


 


Calcium    9.2  (8.6-10.3)  mg/dL


 


Magnesium    2.1  (1.9-2.7)  mg/dL


 


Total Bilirubin    0.30  (0.2-1.0)  mg/dL


 


AST    15  (13-39)  U/L


 


ALT    21  (7-52)  U/L


 


Alkaline Phosphatase    63  ()  U/L


 


Total Creatine Kinase    25  ()  U/L


 


CK-MB (CK-2)    0.7  (0.6-6.3)  ng/mL


 


Troponin I    0.00  (<0.04)  ng/mL


 


B-Natriuretic Peptide  47   ( - 100) pg/mL


 


Total Protein    7.3  (6.4-8.9)  g/dL


 


Albumin    3.8  (3.2-5.2)  g/dL


 


Globulin    3.5  (2-4)  g/dL


 


Albumin/Globulin Ratio    1.1  (1-3)  


 


TSH    3.10  (0.34-5.60)  mcIU/mL














  10/19/17 Range/Units





  17:04 


 


WBC   (3.5-10.8)  10^3/ul


 


RBC   (4.0-5.4)  10^6/ul


 


Hgb   (12.0-16.0)  g/dl


 


Hct   (35-47)  %


 


MCV   (80-97)  fL


 


MCH   (27-31)  pg


 


MCHC   (31-36)  g/dl


 


RDW   (10.5-15)  %


 


Plt Count   (150-450)  10^3/ul


 


MPV   (7.4-10.4)  um3


 


Neut % (Auto)   (38-83)  %


 


Lymph % (Auto)   (25-47)  %


 


Mono % (Auto)   (1-9)  %


 


Eos % (Auto)   (0-6)  %


 


Baso % (Auto)   (0-2)  %


 


Absolute Neuts (auto)   (1.5-7.7)  10^3/ul


 


Absolute Lymphs (auto)   (1.0-4.8)  10^3/ul


 


Absolute Monos (auto)   (0-0.8)  10^3/ul


 


Absolute Eos (auto)   (0-0.6)  10^3/ul


 


Absolute Basos (auto)   (0-0.2)  10^3/ul


 


Absolute Nucleated RBC   10^3/ul


 


Nucleated RBC %   


 


Sodium   (133-145)  mmol/L


 


Potassium   (3.5-5.0)  mmol/L


 


Chloride   (101-111)  mmol/L


 


Carbon Dioxide   (22-32)  mmol/L


 


Anion Gap   (2-11)  mmol/L


 


BUN   (6-24)  mg/dL


 


Creatinine   (0.51-0.95)  mg/dL


 


Est GFR ( Amer)   (>60)  


 


Est GFR (Non-Af Amer)   (>60)  


 


BUN/Creatinine Ratio   (8-20)  


 


Glucose   ()  mg/dL


 


Lactic Acid  1.3  (0.5-2.0)  mmol/L


 


Calcium   (8.6-10.3)  mg/dL


 


Magnesium   (1.9-2.7)  mg/dL


 


Total Bilirubin   (0.2-1.0)  mg/dL


 


AST   (13-39)  U/L


 


ALT   (7-52)  U/L


 


Alkaline Phosphatase   ()  U/L


 


Total Creatine Kinase   ()  U/L


 


CK-MB (CK-2)   (0.6-6.3)  ng/mL


 


Troponin I   (<0.04)  ng/mL


 


B-Natriuretic Peptide  ( - 100) pg/mL


 


Total Protein   (6.4-8.9)  g/dL


 


Albumin   (3.2-5.2)  g/dL


 


Globulin   (2-4)  g/dL


 


Albumin/Globulin Ratio   (1-3)  


 


TSH   (0.34-5.60)  mcIU/mL











Result Diagrams: 


 10/19/17 17:04





 10/20/17 04:56


Lab Statement: Any lab studies that have been ordered have been reviewed, and 

results considered in the medical decision making process.





- Radiology


  ** Chest XR


Xray Interpretation: No Acute Changes - IMPRESSION: No active cardiopulmonary 

disease is noted. ED physician has reviewed this radiology report and agrees.


Radiology Interpretation Completed By: Radiologist





- EKG


  ** 1620


Cardiac Rate: NL - 95 bpm


EKG Rhythm: Sinus Rhythm


EKG Interpretation: No ST elevation





Chest Pain Course/Dx





- Course


Assessment/Plan: This pt is a 46 y/[facility] [place] [facility] [place] [facility] 

onset of chest pain 1.5 hours PTA today. Pt reports that at onset of her chest 

pain she was cleaning. Pt notes her chest pain is on the left side radiating to 

her left shoulder. She describes the pain as pressure and squeezing. Pt reports 

nausea and chills. She states her cough began today with her chest pain. She 

denies vomiting, SOB, chills, weakness or numbness.  Pt denies MI. Her last 

stress test was 2 years ago.  PMHx: HTN, DM.  Test results without any 

significant abnormalities. Chest XR shows no active cardiopulmonary disease is 

noted. EKG shows no ST elevation. The chest pain was relieved by Nitroglycerin 

and aspirin. Since the pt has hypertension and diabetes, I discussed the case 

with Dr. Hendricks, who accepted the pt for admission for further work up and 

management to rule out acute coronary syndrome.





- Chest Pain


Differential Diagnosis/HQI/PQRI: Acute MI, ACS, Angina, CHF, Chest Wall, GI 

Disease, Lower Respiratory Infection, Pulmonary Edema





- Diagnoses


Provider Diagnoses: 


 Chest pain, rule out ACS








- Provider Notifications


Discussed Care Of Patient With: Felipe Hendricks


Time Discussed With Above Provider: 18:54


Instructed by Provider To: Other - I discussed the pt's case with Dr. Hendricks. He 

has accepted the pt for admission.





Discharge





- Discharge Plan


Condition: Stable


Disposition: ADMITTED TO [facility] documentation as recorded by the Gerry howell Angela accurately reflects 

the service I personally performed and the decisions made by me, Austin Romano MD.

## 2018-03-01 ENCOUNTER — HOSPITAL ENCOUNTER (EMERGENCY)
Dept: HOSPITAL 25 - ED | Age: 48
Discharge: HOME | End: 2018-03-01
Payer: COMMERCIAL

## 2018-03-01 VITALS — DIASTOLIC BLOOD PRESSURE: 86 MMHG | SYSTOLIC BLOOD PRESSURE: 135 MMHG

## 2018-03-01 DIAGNOSIS — E87.6: ICD-10-CM

## 2018-03-01 DIAGNOSIS — R11.2: ICD-10-CM

## 2018-03-01 DIAGNOSIS — R10.9: ICD-10-CM

## 2018-03-01 DIAGNOSIS — R19.7: Primary | ICD-10-CM

## 2018-03-01 LAB
BASOPHILS # BLD AUTO: 0.1 10^3/UL (ref 0–0.2)
EOSINOPHIL # BLD AUTO: 0 10^3/UL (ref 0–0.6)
HCT VFR BLD AUTO: 40 % (ref 35–47)
HGB BLD-MCNC: 13.6 G/DL (ref 12–16)
INR PPP/BLD: 0.99 (ref 0.77–1.02)
LYMPHOCYTES # BLD AUTO: 2.7 10^3/UL (ref 1–4.8)
MCH RBC QN AUTO: 30 PG (ref 27–31)
MCHC RBC AUTO-ENTMCNC: 34 G/DL (ref 31–36)
MCV RBC AUTO: 88 FL (ref 80–97)
MONOCYTES # BLD AUTO: 0.8 10^3/UL (ref 0–0.8)
NEUTROPHILS # BLD AUTO: 5.9 10^3/UL (ref 1.5–7.7)
NRBC # BLD AUTO: 0 10^3/UL
NRBC BLD QL AUTO: 0
PLATELET # BLD AUTO: 376 10^3/UL (ref 150–450)
RBC # BLD AUTO: 4.54 10^6/UL (ref 4–5.4)
WBC # BLD AUTO: 9.5 10^3/UL (ref 3.5–10.8)

## 2018-03-01 PROCEDURE — 82803 BLOOD GASES ANY COMBINATION: CPT

## 2018-03-01 PROCEDURE — 81015 MICROSCOPIC EXAM OF URINE: CPT

## 2018-03-01 PROCEDURE — 85025 COMPLETE CBC W/AUTO DIFF WBC: CPT

## 2018-03-01 PROCEDURE — 82150 ASSAY OF AMYLASE: CPT

## 2018-03-01 PROCEDURE — 93005 ELECTROCARDIOGRAM TRACING: CPT

## 2018-03-01 PROCEDURE — 80053 COMPREHEN METABOLIC PANEL: CPT

## 2018-03-01 PROCEDURE — 81003 URINALYSIS AUTO W/O SCOPE: CPT

## 2018-03-01 PROCEDURE — 86140 C-REACTIVE PROTEIN: CPT

## 2018-03-01 PROCEDURE — 83690 ASSAY OF LIPASE: CPT

## 2018-03-01 PROCEDURE — 87086 URINE CULTURE/COLONY COUNT: CPT

## 2018-03-01 PROCEDURE — 85610 PROTHROMBIN TIME: CPT

## 2018-03-01 PROCEDURE — 83735 ASSAY OF MAGNESIUM: CPT

## 2018-03-01 PROCEDURE — 82550 ASSAY OF CK (CPK): CPT

## 2018-03-01 PROCEDURE — 36415 COLL VENOUS BLD VENIPUNCTURE: CPT

## 2018-03-01 PROCEDURE — 99282 EMERGENCY DEPT VISIT SF MDM: CPT

## 2018-03-01 PROCEDURE — 87502 INFLUENZA DNA AMP PROBE: CPT

## 2018-03-01 PROCEDURE — 83605 ASSAY OF LACTIC ACID: CPT

## 2018-03-01 NOTE — XMS REPORT
Marielos Severino

 Created on:2018



Patient:Marielos Severino

Sex:Female

:1970

External Reference #:[id number]





Demographics







 Address  [address] [place] [address]

 

 Mobile Phone  [phone]

 

 Email Address  [email or web address]

 

 Preferred Language  English

 

 Marital Status   Or 

 

 Taoist Affiliation  Unknown

 

 Race  Other Race

 

 Ethnic Group   Or 









Author







 Organization  [facility]

 

 Address  [address] [place] [address]

 

 Phone  [phone]









Support







 Name  Relationship  Address  Phone

 

 Raisa Knight  Daughter  Unavailable  [phone]

 

 Anshul Gallagher  Son  Unavailable  [phone]









Care Team Providers







 Name  Role  Phone

 

 Lin Lima M.D.  Care Team Information   Unavailable

 

 Lin Lima M.D.  Primary Care Physician  Unavailable









Payers







 Type  Date  Identification Numbers  Payment Provider  Subscriber

 

 Medicaid  Effective:  Policy Number: [id number]  [facility]  Marielos Severino



   2017      









 PayID: [id number]  [address] [address]









 [place] [address]









 Medicaid  Effective: 2017  Policy Number: [id number]  Medicaid [place]  Marielos Severino









 Expires: 2017  PayID: [id number]  [address]









 [place] [address]







Problems







 Date  Description  Provider  Status

 

 Onset: 2016  Type 2 diabetes mellitus  ROSALINDA Bryan  Active

 

 Onset: 2016  Low back pain  ROSALINDA Bryan  Active

 

 Onset: 2016  Moderate recurrent major depression  Lin Lima M.D.  
Active

 

 Onset: 2016  Chronic back pain  Lin Lima M.D.  Active

 

 Onset: 2016  Essential hypertension  Lin Lima M.D.  Active

 

 Onset: 2016  Fibromyalgia  Lin Lima M.D.  Active

 

 Onset: 2017  H/O: attempted suicide  Lin Lima M.D.  Active









   Note: narcotic overdose









 Onset: 2017  Hyperlipidemia  Lin Lima M.D.  Active







Family History







 Date  Family Member(s)  Problem(s)  Comments

 

   Father   due to cholecystitis  ()

 

   Mother  Cancer  thyroid

 

   First Son  Prader Willi  

 

   First Son  Developmentally disabled  







Social History







 Type  Date  Description  Comments

 

 Lives With    Daughter  

 

 Lives With    Son  

 

 Lives With    Boyfriend  

 

 Occupation    Not Currently Working  

 

 Occupation    Disabled  

 

 Cigarette Use    Never Smoked Cigarettes  

 

 ETOH Use    Denies alcohol use  

 

 Smoking    Patient has never smoked  

 

 Exercise Type/Frequency    Exercises rarely  







Allergies, Adverse Reactions, Alerts







 Date  Description  Reaction  Status  Severity  Comments

 

 2016  Motrin  Nausea and Vomiting  active    







Medications







 Medication  Date  Status  Form  Strength  Qnty  SIG  Indications  Ordering



                 Provider

 

 Azithromycin    Hx  Tablets  250mg  6tabs  2 by mouth              every day    Juan,



   -          x1 then 1    Afnp-C



             by mouth    



             every day    



             x 4 more    



             days    

 

 Methocarbamol    Active  Tablets  750mg  60tab  1 tab by            s  mouth    Bode,



             twice a    M.D.



             day as    



             needed    

 

 Nitroglycerin  10/23  Active  Tablets  0.4mg  14tab  1 sl q5        Sub    s  min x 3    Bode,



             doses as    M.D.



             needed for    



             chest    



             pain; if    



             no relief    



             after 3    



             doses call    



             911    

 

 Lisinopril  10/23  Active  Tablets  10mg  30tab  1 by mouth  I10          s  every day    Bode,



                 M.D.

 

 Ondansetron    Active  Tablets  4mg  30tab  1 tab by  R11.10      Dispers    s  mouth    Bode,



             every 6    M.D.



             hours as    



             needed    

 

 Metoclopramide HCL    Active  Tablets  10mg  30tab  take 1  R11.10      Dispers    s  tablet by    Bode,



             mouth    M.D.



             every 8    



             hours as    



             needed for    



             nausea    

 

 Diphenhydramine    Active  Tablets  25mg  60tab  1-2 tabs  R11.10  



 HCL          s  by mouth    Bode,



             every 6    M.D.



             hours as    



             needed    

 

 Zofran Odt    Active  Tablets  4mg  30tab  dissolve 1        Dispers    s  tab under    Bode,



             tongue    M.D.



             every 6    



             hours as    



             needed    



             nausea    

 

 Onetouch Ultra 2  10/24  Active  Kit  w/Device  1unit  test blood    Óscar JLuis F



           s  sugar once    Breiman,



             a day or    M.D.



             as    



             directed    



             dx: e11.9    



             last    



             office    



             visit    



             10/19/16    

 

 Ventolin HFA  10/17  Active  Aerosol  108(90Bas  18uni  inhale two  J06.9  
      e)  ts  puffs by    [facility],



         mcg/Act    mouth qid    Afnp-C



             prn    



             cough/whee    



             ze    

 

 uvia    Active  Tablets  50mg  30tab  1 tab by    Lin



           s  mouth    Clarence



             every day    M.D.

 

 Oxycodone HCL    Active  Tablets  15mg    tid as    Unknown



   /0000          needed Dr Samson    

 

 Valium    Active  Tablets  10mg    as needed    Unknown



             Dr Vera    

 

 Albuterol Sulfate    Active  Nebulizer  1.25mg/3M    3ml per    Unknown



         L    nebulizer    



             every 6    



             hours as    



             needed    

 

 Singulair    Active  Tablets  10mg    1 by mouth    Unknown



             every day    

 

 Zyrtec Allergy    Active  Tablets  10mg    prn    Unknown



   /0000              

 

 Effexor XR    Active  Caps ER  150mg    1 by mouth    Unknown



       24HR      every day    



             along with    



             75 mg    

 

 Effexor XR    Active  Caps ER  75mg    1 by mouth    Unknown



       24HR      every day    



             along with    



             150 mg    

 

 Gabapentin    Active  Capsules  300mg    1 po qid    Unknown



   /0000              

 

 Atorvastatin    Active  Tablets  10mg  30tab  1 by mouth    Lin



 Calcium  /0000        s  every day    ADELE Lima

 

 Cyanocobalamin    Active  Solution  1000mcg/M    Dr Samson    Unknown



         L        

 

 Abilify    Active  Tablets  2mg    1 by mouth    Unknown



             every    



             morning    

 

                 

 

 Cyclobenzaprine    Hx  Tablets  10mg  90tab  take one    Lin



 HCL  /2017        s  tablet by    Clarence



   -          mouth    M.D.



             every           hours as    



             needed    

 

 Bactrim DS    Hx  Tablets  800-160mg  10tab  1 by mouth    Óscar LAI



           s  twice a    Shine,



   -          day    M.D.



                 

 

 Azithromycin  10/17  Hx  Tablets  250mg  12tab  take 2  J06.9  Anna



           s  tablets by    Nisha,



   -          mouth x 3d    FNP



             then take    



             1 tablet    



             daily for    



             next 6    



             days    

 

 Januvia    Hx  Tablets  100mg  30tab  1 by mouth            s  every day    Bode



   -              M.DLusi F



                 

 

 Sphygmomanometer    Hx  Misc      take blood  I10            pressure    Bode



   -          daily    M.D.



                 

 

 Truetrack Test    Hx  Strips    100un  test once  E11.9          its  a day Dx:    Bode,



   -          E11.9    M.D.



   10/24          Last    



   /2016          office    



             16    

 

 Lisinopril    Hx  Tablets  5mg  30tab  Take One  I10          s  Tablet By    Bode,



   -          Mouth    M.D.



   10/23          Every Day    



   /2017              

 

 Effexor XR  00/00  Hx  Caps ER  37.5mg    1 by mouth    Unknown



   /0000    24HR      qd w/75mg    



   -              



                 

 

 Effexor XR  00/00  Hx  Caps ER  75mg    1 by mouth    Unknown



   /0000    24HR      every day    



   -          w/37.5    



                 

 

 Albuterol Sulfate  00  Hx  Powder      2 puffs    Unknown



   /0000          every 4    



   -          hours as    



                 

 

 Effexor XR  00/00  Hx  Caps ER  150mg    1 by mouth    Unknown



   /0000    24HR      every day    



   -              



                 

 

 Lipitor  00/00  Hx  Tablets  20mg    1 by mouth    Unknown



   /0000          every day    



   -              



   10/23              



   /2017              







Vital Signs







 Date  Vital  Result  Comment

 

 2018  BP Systolic  124 mmHg  









 BP Diastolic  80 mmHg  

 

 Heart Rate  84 /min  

 

 Body Temperature  98.4 F  

 

 Respiratory Rate  16 /min  

 

 Height  61 inches  5'1"

 

 Weight  174.12 lb  

 

 BMI (Body Mass Index)  32.9 kg/m2  









 2018  BP Systolic  144 mmHg  









 BP Diastolic  84 mmHg  

 

 Heart Rate  74 /min  

 

 Body Temperature  98.1 F  

 

 Respiratory Rate  16 /min  

 

 Height  61 inches  5'1"

 

 Weight  173.00 lb  

 

 BMI (Body Mass Index)  32.7 kg/m2  









 2017  BP Systolic  120 mmHg  









 BP Diastolic  80 mmHg  

 

 Heart Rate  74 /min  

 

 Body Temperature  98.8 F  

 

 Respiratory Rate  16 /min  

 

 Height  61 inches  5'1"

 

 Weight  170.00 lb  

 

 BMI (Body Mass Index)  32.1 kg/m2  









 2017  BP Systolic  120 mmHg  









 BP Diastolic  80 mmHg  

 

 Heart Rate  68 /min  

 

 Body Temperature  98.6 F  

 

 Respiratory Rate  18 /min  

 

 Height  61 inches  5'1"

 

 Weight  166.00 lb  

 

 BMI (Body Mass Index)  31.4 kg/m2  









 10/23/2017  BP Systolic  110 mmHg  









 BP Diastolic  70 mmHg  

 

 Heart Rate  76 /min  

 

 Body Temperature  98.8 F  

 

 Respiratory Rate  18 /min  

 

 Height  61 inches  5'1"

 

 Weight  167.00 lb  

 

 BMI (Body Mass Index)  31.6 kg/m2  









 10/02/2017  BP Systolic  120 mmHg  









 BP Diastolic  60 mmHg  

 

 Heart Rate  84 /min  

 

 Body Temperature  97.9 F  

 

 Respiratory Rate  16 /min  

 

 Height  61 inches  5'1"

 

 Weight  166.38 lb  

 

 BMI (Body Mass Index)  31.4 kg/m2  









 2017  BP Systolic  134 mmHg  









 BP Diastolic  78 mmHg  

 

 Heart Rate  80 /min  

 

 Body Temperature  98.6 F  

 

 Respiratory Rate  18 /min  

 

 Height  61 inches  5'1"

 

 Weight  163.00 lb  

 

 BMI (Body Mass Index)  30.8 kg/m2  









 2017  BP Systolic  132 mmHg  









 BP Diastolic  78 mmHg  

 

 Heart Rate  84 /min  

 

 Body Temperature  98.0 F  

 

 Respiratory Rate  16 /min  

 

 Height  61 inches  5'1"

 

 Weight  163.00 lb  

 

 BMI (Body Mass Index)  30.8 kg/m2  









 2017  BP Systolic  130 mmHg  









 BP Diastolic  80 mmHg  

 

 Heart Rate  76 /min  

 

 Respiratory Rate  16 /min  

 

 Height  61 inches  5'1"

 

 Weight  160.00 lb  

 

 BMI (Body Mass Index)  30.2 kg/m2  









 2017  BP Systolic  100 mmHg  









 BP Diastolic  70 mmHg  

 

 Heart Rate  88 /min  

 

 Body Temperature  98.6 F  

 

 Respiratory Rate  16 /min  

 

 Height  61 inches  5'1"

 

 Weight  151.00 lb  

 

 BMI (Body Mass Index)  28.5 kg/m2  









 2017  BP Systolic  130 mmHg  









 BP Diastolic  72 mmHg  

 

 Heart Rate  84 /min  

 

 Body Temperature  97.3 F  

 

 Height  61 inches  5'1"

 

 Weight  160.00 lb  

 

 BMI (Body Mass Index)  30.2 kg/m2  









 2016  BP Systolic  118 mmHg  









 BP Diastolic  76 mmHg  

 

 Heart Rate  78 /min  

 

 Body Temperature  98.2 F  

 

 Respiratory Rate  16 /min  

 

 Height  61 inches  5'1"

 

 Weight  158.00 lb  

 

 BMI (Body Mass Index)  29.9 kg/m2  









 10/17/2016  BP Systolic  110 mmHg  









 BP Diastolic  60 mmHg  

 

 Heart Rate  80 /min  

 

 Body Temperature  98.8 F  

 

 Respiratory Rate  18 /min  

 

 O2 % BldC Oximetry  98 %  

 

 Height  61 inches  5'1"

 

 Weight  145.00 lb  

 

 BMI (Body Mass Index)  27.4 kg/m2  









 2016  BP Systolic  120 mmHg  









 BP Diastolic  70 mmHg  

 

 Heart Rate  84 /min  

 

 Body Temperature  97.7 F  

 

 Respiratory Rate  16 /min  

 

 Height  61 inches  5'1"

 

 Weight  145.00 lb  

 

 BMI (Body Mass Index)  27.4 kg/m2  









 2016  BP Systolic  120 mmHg  









 BP Diastolic  76 mmHg  

 

 Heart Rate  80 /min  

 

 Body Temperature  98.2 F  

 

 Respiratory Rate  16 /min  

 

 Height  61 inches  5'1"

 

 Weight  143.00 lb  

 

 BMI (Body Mass Index)  27.0 kg/m2  









 2016  BP Systolic  118 mmHg  









 BP Diastolic  80 mmHg  

 

 Heart Rate  80 /min  

 

 Body Temperature  98.4 F  

 

 Respiratory Rate  16 /min  

 

 Height  61 inches  5'1"

 

 Weight  143.25 lb  

 

 BMI (Body Mass Index)  27.1 kg/m2  







Results







 Test  Date  Test  Result  H/L  Range  Note

 

 Laboratory test finding  2017  TSH  1.400 uIU/mL    0.450-4.500  1

 

 Metabolic Panel (14),  2017  Glucose, Serum  106 mg/dL  High  65-99  1



 Comprehensive            









 BUN  10 mg/dL    6-24  1

 

 Creatinine, Serum  0.61 mg/dL    0.57-1.00  1

 

 eGFR If NonAfricn Am  108 mL/min/1.73    &gt;59  1

 

 eGFR If Africn Am  124 mL/min/1.73    &gt;59  1

 

 BUN/Creatinine Ratio  16    9-23  1

 

 Sodium, Serum  143 mmol/L    134-144  1

 

 Potassium, Serum  4.2 mmol/L    3.5-5.2  1

 

 Chloride, Serum  103 mmol/L      1

 

 Carbon Dioxide, Total  25 mmol/L    18-29  1

 

 Calcium, Serum  9.2 mg/dL    8.7-10.2  1

 

 Protein, Total, Serum  7.4 g/dL    6.0-8.5  1

 

 Albumin, Serum  4.1 g/dL    3.5-5.5  1

 

 Globulin, Total  3.3 g/dL    1.5-4.5  1

 

 A/G Ratio  1.2    1.2-2.2  1

 

 Bilirubin, Total  &lt;0.2 mg/dL    0.0-1.2  1

 

 Alkaline Phosphatase, S  80 IU/L      1

 

 Ast (Sgot)  21 IU/L    0-40  1

 

 Alt (SGPT)  18 IU/L    0-32  1









 Lipid Panel  2017  Cholesterol, Total  263 mg/dL  High  100-199  1









 Triglycerides  124 mg/dL    0-149  1

 

 HDL Cholesterol  65 mg/dL    &gt;39  1

 

 VLDL Cholesterol David  25 mg/dL    5-40  1

 

 LDL Cholesterol Calc  173 mg/dL  High  0-99  1

 

 Comment:  TNP      1









 CBC With Differential/Platelet  2017  WBC  6.2 x10E3/uL    3.4-10.8  1









 RBC  4.33 x10E6/uL    3.77-5.28  1

 

 Hemoglobin  12.8 g/dL    11.1-15.9  1

 

 Hematocrit  39.0 %    34.0-46.6  1

 

 MCV  90 fL    79-97  1

 

 MCH  29.6 pg    26.6-33.0  1

 

 MCHC  32.8 g/dL    31.5-35.7  1

 

 RDW  14.3 %    12.3-15.4  1

 

 Platelets  402 x10E3/uL  High  150-379  1

 

 Neutrophils  58 %    Not Estab.  1

 

 Lymphs  32 %    Not Estab.  1

 

 Monocytes  8 %    Not Estab.  1

 

 Eos  2 %    Not Estab.  1

 

 Basos  0 %    Not Estab.  1

 

 Immature Cells  TNP      1

 

 Neutrophils (Absolute)  3.6 x10E3/uL    1.4-7.0  1

 

 Lymphs (Absolute)  2.0 x10E3/uL    0.7-3.1  1

 

 Monocytes(Absolute)  0.5 x10E3/uL    0.1-0.9  1

 

 Eos (Absolute)  0.1 x10E3/uL    0.0-0.4  1

 

 Baso (Absolute)  0.0 x10E3/uL    0.0-0.2  1

 

 Immature Granulocytes  0 %    Not Estab.  1

 

 Immature Grans (Abs)  0.0 x10E3/uL    0.0-0.1  1

 

 NRBC  TNP      1

 

 Hematology Comments:  TNP      1









 Hemoglobin A1c  2017  Hemoglobin A1c  5.6 %    4.8-5.6  1, 2

 

 Laboratory test  2017  Thyroxine (T4) Free,  1.09 ng/dL    0.82-1.77  1



 finding    Direct, S        

 

 Laboratory test  2017  Glucose Serum  106  High    



 finding            

 

 Laboratory test  10/19/2017  TSH (Thyroid Stim  3.10 mcIU/mL    0.34-5.60  



 finding    Horm)        









 Blood Culture  SEE RESULT BELOW      3









 CBC Auto Diff  10/19/2017  White Blood Count  10.2 10^3/uL    3.5-10.8  









 Red Blood Count  4.29 10^6/uL    4.0-5.4  

 

 Hemoglobin  12.9 g/dL    12.0-16.0  

 

 Hematocrit  38 %    35-47  

 

 Mean Corpuscular Volume  89 fL    80-97  

 

 Mean Corpuscular Hemoglobin  30 pg    27-31  

 

 Mean Corpuscular HGB Conc  34 g/dL    31-36  

 

 Red Cell Distribution Width  14 %    10.5-15  

 

 Platelet Count  414 10^3/uL    150-450  

 

 Mean Platelet Volume  7 um3  Low  7.4-10.4  

 

 Abs Neutrophils  7.5 10^3/uL    1.5-7.7  

 

 Abs Lymphocytes  1.8 10^3/uL    1.0-4.8  

 

 Abs Monocytes  0.9 10^3/uL  High  0-0.8  

 

 Abs Eosinophils  0 10^3/uL    0-0.6  

 

 Abs Basophils  0.1 10^3/uL    0-0.2  

 

 Abs Nucleated RBC  0 10^3/uL      

 

 Granulocyte %  73.2 %    38-83  

 

 Lymphocyte %  17.5 %  Low  25-47  

 

 Monocyte %  8.4 %    1-9  

 

 Eosinophil %  0.2 %    0-6  

 

 Basophil %  0.7 %    0-2  

 

 Nucleated Red Blood Cells %  0      









 Laboratory test finding  10/19/2017  B-Type Natriuretic Peptide BNP  47 pg/mL 
     4









 Lactic Acid  1.3 mmol/L    0.5-2.0  5









 Comp Metabolic Panel  10/19/2017  Sodium  136 mmol/L    133-145  









 Potassium  3.5 mmol/L    3.5-5.0  

 

 Chloride  103 mmol/L    101-111  

 

 Co2 Carbon Dioxide  26 mmol/L    22-32  

 

 Anion Gap  7 mmol/L    2-11  

 

 Glucose  91 mg/dL      

 

 Blood Urea Nitrogen  15 mg/dL    6-24  

 

 Creatinine  0.62 mg/dL    0.51-0.95  

 

 BUN/Creatinine Ratio  24.2  High  8-20  

 

 Calcium  9.2 mg/dL    8.6-10.3  

 

 Total Protein  7.3 g/dL    6.4-8.9  

 

 Albumin  3.8 g/dL    3.2-5.2  

 

 Globulin  3.5 g/dL    2-4  

 

 Albumin/Globulin Ratio  1.1    1-3  

 

 Total Bilirubin  0.30 mg/dL    0.2-1.0  

 

 Alkaline Phosphatase  63 U/L      

 

 Alt  21 U/L    7-52  

 

 Ast  15 U/L    13-39  

 

 Egfr Non-  102.7    &gt;60  

 

 Egfr   132.1    &gt;60  6









 Laboratory test finding  10/19/2017  Magnesium  2.1 mg/dL    1.9-2.7  









 Creatine Kinase(CK)  25 U/L      

 

 Troponin I  0.00 ng/mL    &lt;0.04  









 CKMB  10/19/2017  CKMB  ng/mL  0.7 ng/mL    0.6-6.3  

 

 CBC No Diff  10/03/2017  White Blood Count  17.4 10^3/uL  High  3.5-10.8  









 Red Blood Count  4.29 10^6/uL    4.0-5.4  

 

 Hemoglobin  13.0 g/dL    12.0-16.0  

 

 Hematocrit  39 %    35-47  

 

 Mean Corpuscular Volume  90 fL    80-97  

 

 Mean Corpuscular Hemoglobin  30 pg    27-31  

 

 Mean Corpuscular HGB Conc  34 g/dL    31-36  

 

 Red Cell Distribution Width  14 %    10.5-15  

 

 Platelet Count  383 10^3/uL    150-450  

 

 Mean Platelet Volume  8 um3    7.4-10.4  









 Urinalysis Profile  10/03/2017  Urine Color  Helen      









 Urine Appearance  Turbid      

 

 Urine Specific Gravity  1.028    1.010-1.030  

 

 Urine pH  5.0    5-9  

 

 Urine Urobilinogen  Negative    Negative  

 

 Urine Ketones  Trace    Negative  

 

 Urine Protein  2+(100 mg/dL)    Negative  

 

 Urine Leukocytes  Negative    Negative  

 

 Urine Blood  Negative    Negative  

 

 Urine Nitrite  Negative    Negative  

 

 Urine Bilirubin  Negative    Negative  

 

 Urine Glucose  Negative    Negative  

 

 Urine White Blood Cell  2+(11-20/hpf)    Absent  

 

 Urine Red Blood Cell  3+(&gt;10/hpf)    Absent  

 

 Urine Bacteria  3+    Absent  

 

 Urine Squamous Epithelial Cell  Present    Absent  

 

 Urine Yeast  Present    Absent  









 Comp Metabolic Panel  10/03/2017  Sodium  138 mmol/L    133-145  









 Potassium  4.3 mmol/L    3.5-5.0  

 

 Chloride  104 mmol/L    101-111  

 

 Co2 Carbon Dioxide  27 mmol/L    22-32  

 

 Anion Gap  7 mmol/L    2-11  

 

 Glucose  81 mg/dL      

 

 Blood Urea Nitrogen  11 mg/dL    6-24  

 

 Creatinine  0.64 mg/dL    0.51-0.95  

 

 BUN/Creatinine Ratio  17.2    8-20  

 

 Calcium  9.7 mg/dL    8.6-10.3  

 

 Total Protein  7.2 g/dL    6.4-8.9  

 

 Albumin  3.9 g/dL    3.2-5.2  

 

 Globulin  3.3 g/dL    2-4  

 

 Albumin/Globulin Ratio  1.2    1-3  

 

 Total Bilirubin  0.20 mg/dL    0.2-1.0  

 

 Alkaline Phosphatase  63 U/L      

 

 Alt  15 U/L    7-52  

 

 Ast  15 U/L    13-39  

 

 Egfr Non-  99.0    &gt;60  

 

 Egfr   127.4    &gt;60  7









 Lipid Profile (Trig/Chol/HDL)  10/03/2017  Triglycerides  60 mg/dL      8









 Cholesterol  228 mg/dL      9

 

 HDL Cholesterol  67.9 mg/dL      10

 

 LDL Cholesterol  148 mg/dL      11









 Laboratory test finding  10/03/2017  Thyroxine  10.33 g/mL    6.09-12.23  









 TSH (Thyroid Stim Horm)  0.75 mcIU/mL    0.34-5.60  









 Urine Microalbumin Random  10/03/2017  Ur Microalbumin (mg/L)  82.3 mg/L      









 Urine Creatinine  361.76 mg/dL      

 

 Urine Microalbumin/Creatinine  22.7 ug/mg    &lt;31  









 Laboratory test finding  10/03/2017  Hemoglobin A1c (Glyco  5.8 %    Less than 
6.0  12



     HGB)        









 Urine Culture And Sensitivities  SEE RESULT BELOW      13









 Ua - Non Micro (Fma)  2017  Appearance  clear      









 Color  yellow      

 

 Glucose, Urine (Fma/CMC/CTX)  neg      

 

 Bilirubin  neg      

 

 Ketones  neg      

 

 SP Grav  &gt;=1.030      

 

 Blood  neg      

 

 PH  5.5      

 

 Protein  neg      

 

 Urobil  0.2      

 

 Nitrite  neg      

 

 Leukocytes (Fma/CMC/Centrex)  neg      









 Urine Culture Routine  2017  Urine Culture, Routine  Final report      14
, 15









 Result 1  Escherichia coli      14, 16

 

 Antimicrobial Susceptibility  See Comment:      14, 17









 Laboratory test finding  2017  Acetaminophen  &lt; 15 g/mL      18

 

 CBC Auto Diff  2017  White Blood Count  8.7 10^3/uL    3.5-10.8  









 Red Blood Count  4.46 10^6/uL    4.0-5.4  

 

 Hemoglobin  13.3 g/dL    12.0-16.0  

 

 Hematocrit  40 %    35-47  

 

 Mean Corpuscular Volume  90 fL    80-97  

 

 Mean Corpuscular Hemoglobin  30 pg    27-31  

 

 Mean Corpuscular HGB Conc  34 g/dL    31-36  

 

 Red Cell Distribution Width  14 %    10.5-15  

 

 Platelet Count  342 10^3/uL    150-450  

 

 Mean Platelet Volume  7 um3  Low  7.4-10.4  

 

 Abs Neutrophils  5.8 10^3/uL    1.5-7.7  

 

 Abs Lymphocytes  2.1 10^3/uL    1.0-4.8  

 

 Abs Monocytes  0.7 10^3/uL    0-0.8  

 

 Abs Eosinophils  0.1 10^3/uL    0-0.6  

 

 Abs Basophils  0.1 10^3/uL    0-0.2  

 

 Abs Nucleated RBC  0 10^3/uL      

 

 Granulocyte %  66.8 %    38-83  

 

 Lymphocyte %  24.0 %  Low  25-47  

 

 Monocyte %  7.9 %    1-9  

 

 Eosinophil %  0.7 %    0-6  

 

 Basophil %  0.6 %    0-2  

 

 Nucleated Red Blood Cells %  0      









 Laboratory test finding  2017  Lactic Acid  1.1 mmol/L    0.5-2.0  19

 

 Comp Metabolic Panel  2017  Sodium  135 mmol/L    133-145  









 Potassium  3.7 mmol/L    3.5-5.0  

 

 Chloride  99 mmol/L  Low  101-111  

 

 Co2 Carbon Dioxide  26 mmol/L    22-32  

 

 Anion Gap  10 mmol/L    2-11  

 

 Glucose  126 mg/dL  High    

 

 Blood Urea Nitrogen  13 mg/dL    6-24  

 

 Creatinine  0.74 mg/dL    0.51-0.95  

 

 BUN/Creatinine Ratio  17.6    8-20  

 

 Calcium  9.3 mg/dL    8.6-10.3  

 

 Total Protein  7.9 g/dL    6.4-8.9  

 

 Albumin  4.1 g/dL    3.2-5.2  

 

 Globulin  3.8 g/dL    2-4  

 

 Albumin/Globulin Ratio  1.1    1-3  

 

 Total Bilirubin  0.40 mg/dL    0.2-1.0  

 

 Alkaline Phosphatase  71 U/L      

 

 Alt  16 U/L    7-52  

 

 Ast  20 U/L    13-39  

 

 Egfr Non-  83.8    &gt;60  

 

 Egfr   107.7    &gt;60  20









 Laboratory test finding  2017  Acetaminophen  &lt; 15 g/mL      21









 Alcohol  &lt; 10 mg/dL    &lt;10  

 

 Salicylate  &lt; 2.50 mg/dL    &lt;30  









 CBC Auto Diff  03/10/2017  White Blood Count  5.3 10^3/uL    3.5-10.8  









 Red Blood Count  4.55 10^6/uL    4.0-5.4  

 

 Hemoglobin  13.5 g/dL    12.0-16.0  

 

 Hematocrit  41 %    35-47  

 

 Mean Corpuscular Volume  90 fL    80-97  

 

 Mean Corpuscular Hemoglobin  30 pg    27-31  

 

 Mean Corpuscular HGB Conc  33 g/dL    31-36  

 

 Red Cell Distribution Width  14 %    10.5-15  

 

 Platelet Count  349 10^3/uL    150-450  

 

 Mean Platelet Volume  8 um3    7.4-10.4  

 

 Abs Neutrophils  3.2 10^3/uL    1.5-7.7  

 

 Abs Lymphocytes  1.5 10^3/uL    1.0-4.8  

 

 Abs Monocytes  0.5 10^3/uL    0-0.8  

 

 Abs Eosinophils  0.1 10^3/uL    0-0.6  

 

 Abs Basophils  0 10^3/uL    0-0.2  

 

 Abs Nucleated RBC  0 10^3/uL      

 

 Granulocyte %  59.6 %    38-83  

 

 Lymphocyte %  28.0 %    25-47  

 

 Monocyte %  10.2 %  High  1-9  

 

 Eosinophil %  1.7 %    0-6  

 

 Basophil %  0.5 %    0-2  

 

 Nucleated Red Blood Cells %  0      









 Comp Metabolic Panel  03/10/2017  Sodium  138 mmol/L    133-145  









 Potassium  4.1 mmol/L    3.5-5.0  

 

 Chloride  102 mmol/L    101-111  

 

 Co2 Carbon Dioxide  29 mmol/L    22-32  

 

 Anion Gap  7 mmol/L    2-11  

 

 Glucose  83 mg/dL      

 

 Blood Urea Nitrogen  14 mg/dL    6-24  

 

 Creatinine  0.66 mg/dL    0.51-0.95  

 

 BUN/Creatinine Ratio  21.2  High  8-20  

 

 Calcium  9.2 mg/dL    8.6-10.3  

 

 Total Protein  6.8 g/dL    6.4-8.9  

 

 Albumin  3.9 g/dL    3.2-5.2  

 

 Globulin  2.9 g/dL    2-4  

 

 Albumin/Globulin Ratio  1.3    1-3  

 

 Total Bilirubin  0.50 mg/dL    0.2-1.0  

 

 Alkaline Phosphatase  61 U/L      

 

 Alt  22 U/L    7-52  

 

 Ast  15 U/L    13-39  

 

 Egfr Non-  96.0    &gt;60  

 

 Egfr   123.5    &gt;60  22









 Laboratory test finding  03/10/2017  Magnesium  2.0 mg/dL    1.9-2.7  23









 Creatine Kinase(CK)  31 U/L      24

 

 TSH (Thyroid Stim Horm)  1.86 mcIU/mL    0.34-5.60  25

 

 Thyroperoxidase AB  608.12 IU/mL  High  &lt;9  26

 

 Folic Acid (Folate)  13.65 ng/mL    &gt;3.99  27

 

 Vitamin B12  386 pg/mL    180-914  28

 

 Vitamin D Total 25(Oh)  20.0 ng/mL  Low  30-50  29

 

 Angiotensin Converting Enzyme  20 U/L    8 - 53  30

 

 Thyroglobulin AB  40 IU/mL    &lt;4.0  31









 Anca AB Ser If  03/10/2017  C-Anca  Negative    Negative  









 P-Anca  Negative    Negative  32









 Connective Tissue Panel  03/10/2017  Anti-Nuclear Antibody  0.6 U      33









 Cyclic Citrullinated Peptide  &lt;15.6 U      34

 

 Interpretation  See Comment      35









 Laboratory test finding  03/10/2017  Rheumatoid Factor  &lt;15 IU/mL    &lt;15
  36

 

 Hla B27  03/10/2017  Hla B27  Negative      37









 Hla B27 Interp  See Comment      38









 Laboratory test finding  03/10/2017  Vitamin D, 1,25 Dihydroxy  78 pg/mL    18-
78  39

 

 Laboratory test finding  2017  Potassium Redraw  3.7 mmol/L    3.5-5.0  









 Ast Redraw  22 U/L    13-39  









 Comp Metabolic Panel  2017  Sodium  134 mmol/L    133-145  









 Chloride  103 mmol/L    101-111  

 

 Co2 Carbon Dioxide  24 mmol/L    22-32  

 

 Glucose  131 mg/dL  High    

 

 Blood Urea Nitrogen  9 mg/dL    6-24  

 

 Creatinine  0.65 mg/dL    0.51-0.95  

 

 BUN/Creatinine Ratio  13.8    8-20  

 

 Calcium  9.4 mg/dL    8.6-10.3  

 

 Total Protein  7.6 g/dL    6.4-8.9  

 

 Albumin  4.0 g/dL    3.2-5.2  

 

 Globulin  3.6 g/dL    2-4  

 

 Albumin/Globulin Ratio  1.1    1-3  

 

 Total Bilirubin  0.50 mg/dL    0.2-1.0  

 

 Alkaline Phosphatase  66 U/L      

 

 Alt  21 U/L    7-52  

 

 Egfr Non-  97.7    &gt;60  

 

 Egfr   125.7    &gt;60  40

 

 Ast  22 U/L    13-39  

 

 Potassium  3.6 mmol/L    3.5-5.0  

 

 Anion Gap  7 mmol/L    2-11  









 Laboratory test finding  2017  Magnesium  1.9 mg/dL    1.9-2.7  









 Troponin I  0.00 ng/mL    &lt;0.04  41









 CBC Auto Diff  2017  White Blood Count  6.7 10^3/uL    3.5-10.8  









 Red Blood Count  4.66 10^6/uL    4.0-5.4  

 

 Hemoglobin  14.0 g/dL    12.0-16.0  

 

 Hematocrit  42 %    35-47  

 

 Mean Corpuscular Volume  89 fL    80-97  

 

 Mean Corpuscular Hemoglobin  30 pg    27-31  

 

 Mean Corpuscular HGB Conc  34 g/dL    31-36  

 

 Red Cell Distribution Width  14 %    10.5-15  

 

 Platelet Count  389 10^3/uL    150-450  

 

 Mean Platelet Volume  7 um3  Low  7.4-10.4  

 

 Abs Neutrophils  5.0 10^3/uL    1.5-7.7  

 

 Abs Lymphocytes  0.7 10^3/uL  Low  1.0-4.8  

 

 Abs Monocytes  0.9 10^3/uL  High  0-0.8  

 

 Abs Eosinophils  0.1 10^3/uL    0-0.6  

 

 Abs Basophils  0 10^3/uL    0-0.2  

 

 Abs Nucleated RBC  0 10^3/uL      

 

 Granulocyte %  75.5 %    38-83  

 

 Lymphocyte %  10.1 %  Low  25-47  

 

 Monocyte %  12.8 %  High  1-9  

 

 Eosinophil %  1.2 %    0-6  

 

 Basophil %  0.4 %    0-2  

 

 Nucleated Red Blood Cells %  0      









 CBC Auto Diff  2017  White Blood Count  7.9 10^3/uL    3.5-10.8  









 Red Blood Count  4.36 10^6/uL    4.0-5.4  

 

 Hemoglobin  13.2 g/dL    12.0-16.0  

 

 Hematocrit  39 %    35-47  

 

 Mean Corpuscular Volume  89 fL    80-97  

 

 Mean Corpuscular Hemoglobin  30 pg    27-31  

 

 Mean Corpuscular HGB Conc  34 g/dL    31-36  

 

 Red Cell Distribution Width  14 %    10.5-15  

 

 Platelet Count  320 10^3/uL    150-450  

 

 Mean Platelet Volume  7 um3  Low  7.4-10.4  

 

 Abs Neutrophils  5.3 10^3/uL    1.5-7.7  

 

 Abs Lymphocytes  1.7 10^3/uL    1.0-4.8  

 

 Abs Monocytes  0.7 10^3/uL    0-0.8  

 

 Abs Eosinophils  0.1 10^3/uL    0-0.6  

 

 Abs Basophils  0.1 10^3/uL    0-0.2  

 

 Abs Nucleated RBC  0 10^3/uL      

 

 Granulocyte %  66.9 %    38-83  

 

 Lymphocyte %  22.2 %  Low  25-47  

 

 Monocyte %  9.0 %    1-9  

 

 Eosinophil %  1.1 %    0-6  

 

 Basophil %  0.8 %    0-2  

 

 Nucleated Red Blood Cells %  0      









 Laboratory test finding  2017  Lactic Acid  1.2 mmol/L    0.5-2.0  42

 

 Comp Metabolic Panel  2017  Sodium  135 mmol/L    133-145  









 Chloride  103 mmol/L    101-111  

 

 Co2 Carbon Dioxide  26 mmol/L    22-32  

 

 Glucose  96 mg/dL      

 

 Blood Urea Nitrogen  12 mg/dL    6-24  

 

 Creatinine  0.67 mg/dL    0.51-0.95  

 

 BUN/Creatinine Ratio  17.9    8-20  

 

 Calcium  9.3 mg/dL    8.6-10.3  

 

 Total Protein  7.4 g/dL    6.4-8.9  

 

 Albumin  3.9 g/dL    3.2-5.2  

 

 Globulin  3.5 g/dL    2-4  

 

 Albumin/Globulin Ratio  1.1    1-3  

 

 Total Bilirubin  0.50 mg/dL    0.2-1.0  

 

 Alkaline Phosphatase  56 U/L      

 

 Alt  20 U/L    7-52  

 

 Egfr Non-  94.3    &gt;60  

 

 Egfr   121.3    &gt;60  43

 

 Potassium  4.0 mmol/L    3.5-5.0  

 

 Anion Gap  6 mmol/L    2-11  

 

 Ast  23 U/L    13-39  









 Laboratory test finding  2017  Troponin I  0.01 ng/mL    &lt;0.04  44

 

 Laboratory test finding  10/20/2016  Hemoglobin A1c (Fma)  5.4 %    4.1-5.7  









 Microalb, Random (Fma/CMC/CTX)  21.1 mg/L    0.5-37  









 Ua - Non Micro (Fma)  10/20/2016  Appearance  yellow      









 Color  clear      

 

 Glucose, Urine (Fma/CMC/CTX)  neg      

 

 Bilirubin  neg      

 

 Ketones  trace      

 

 SP Grav  1.015      

 

 Blood  neg      

 

 PH  7.5      

 

 Protein  neg      

 

 Urobil  0.2      

 

 Nitrite  neg      

 

 Leukocytes (Fma/CMC/Centrex)  neg      









 Comprehensive Metabolic Prof  10/20/2016  Sodium  139 mEq/L    134-149  









 Potassium  4.1 mEq/L    3.6-5.5  

 

 Chloride  98 mEq/L      

 

 Carbon Dioxide  26 mEq/L    21-32  

 

 Glucose  90 mg/dL      

 

 BUN  13 mg/dL    6-26  

 

 Creatinine  0.7 mg/dL    0.6-1.4  

 

 BUN/Creat Ratio  18.6 CALC    8.0-36.0  

 

 Calcium  9.1 mg/dL    8.6-10.2  

 

 Total Protein  7.2 g/dL    6.4-8.3  

 

 Albumin  4.1 g/dL    3.8-5.5  

 

 Globulin  3.1 g/dL    2.0-4.8  

 

 A/G Ratio  1.3 CALC    0.6-2.3  

 

 Alk. Phosphatase  62 U/L      

 

 Alt (SGPT)  24 U/L    7-35  

 

 Ast (Sgot)  17 U/L    5-34  

 

 Total Bilirubin  0.3 mg/dL    0.2-1.3  

 

 GFR Non-African American  &gt;60 ml/min/1.73m^    &gt;=60  

 

 GFR   &gt;60 ml/min/1.73m^    &gt;=60  









 Lipid Profile  10/20/2016  Cholesterol  218 mg/dL  High  120-200  









 Triglycerides  46 mg/dL      

 

 HDL Cholesterol  70 mg/dL    30-85  

 

 LDL (Calculated)  139 CALC  High  0-129  

 

 VLDL Cholesterol  9 mg/dL    0-50  

 

 HDL Risk Factor  3.1 CALC    0.0-4.4  









 Complete Blood Count  10/20/2016  WBC  4.5 x10^3/UL    3.6-9.6  









 RBC  4.35 x10^6/UL    3.90-5.70  

 

 HGB  13.5 g/dL    12.1-17.2  

 

 HCT  40 %    36-50  

 

 MCV  92.0 fL    82.2-97.4  

 

 MCH  31.0 pg    27.6-33.3  

 

 MCHC  33.7 g/dL    33.0-35.5  

 

 RDW  13.9 %  High  11.6-13.7  

 

 PLT  350 x10^3/UL    150-400  

 

 MPV  6.0 fL  Low  7.4-10.4  

 

 Gran #  2.7 x10^3/UL    1.5-7.2  

 

 Lymph#  1.6 x10^3/UL    0.7-4.9  

 

 Mono#  0.2 x10^3/UL    0.1-0.9  

 

 Gran %  58.3 %    42.2-75.2  

 

 Lymph %  37.1 %    20.5-51.1  

 

 Mono%  4.6 %    1.7-9.3  









 Laboratory test finding  10/20/2016  TSH  2.15 mIU/L    0.50-6.00  45

 

 Laboratory test finding  10/08/2016  Troponin I  0.00 ng/mL    &lt;0.03  46









 HCG Pregnancy  &lt; 0.60 mIU/mL      47









 Comp Metabolic Panel  10/08/2016  Sodium  138 mmol/L    133-145  









 Potassium  3.1 mmol/L  Low  3.5-5.0  

 

 Chloride  105 mmol/L    101-111  

 

 Co2 Carbon Dioxide  24 mmol/L    22-32  

 

 Anion Gap  9 mmol/L    2-11  

 

 Glucose  123 mg/dL  High    

 

 Blood Urea Nitrogen  15 mg/dL    6-24  

 

 Creatinine  0.83 mg/dL    0.51-0.95  

 

 BUN/Creatinine Ratio  18.1    8-20  

 

 Calcium  9.5 mg/dL    8.6-10.3  

 

 Total Protein  7.2 g/dL    6.4-8.9  

 

 Albumin  3.8 g/dL    3.2-5.2  

 

 Globulin  3.4 g/dL    2-4  

 

 Albumin/Globulin Ratio  1.1    1-3  

 

 Total Bilirubin  0.40 mg/dL    0.2-1.0  

 

 Alkaline Phosphatase  56 U/L      

 

 Alt  16 U/L    7-52  

 

 Ast  17 U/L    13-39  

 

 Egfr Non-  73.7    &gt;60  

 

 Egfr   94.8    &gt;60  48









 Laboratory test finding  10/08/2016  Lactic Acid  1.6 mmol/L    0.5-2.0  49

 

 CBC Auto Diff  10/08/2016  White Blood Count  7.5 10^3/uL    3.5-10.8  









 Red Blood Count  4.29 10^6/uL    4.0-5.4  

 

 Hemoglobin  12.8 g/dL    12.0-16.0  

 

 Hematocrit  38 %    35-47  

 

 Mean Corpuscular Volume  89 fL    80-97  

 

 Mean Corpuscular Hemoglobin  30 pg    27-31  

 

 Mean Corpuscular HGB Conc  34 g/dL    31-36  

 

 Red Cell Distribution Width  13 %    10.5-15  

 

 Platelet Count  354 10^3/uL    150-450  

 

 Mean Platelet Volume  7 um3  Low  7.4-10.4  

 

 Abs Neutrophils  5.3 10^3/uL    1.5-7.7  

 

 Abs Lymphocytes  1.4 10^3/uL    1.0-4.8  

 

 Abs Monocytes  0.7 10^3/uL    0-0.8  

 

 Abs Eosinophils  0.1 10^3/uL    0-0.6  

 

 Abs Basophils  0 10^3/uL    0-0.2  

 

 Abs Nucleated RBC  0 10^3/uL      

 

 Granulocyte %  70.5 %    38-83  

 

 Lymphocyte %  18.4 %  Low  25-47  

 

 Monocyte %  9.8 %  High  1-9  

 

 Eosinophil %  0.8 %    0-6  

 

 Basophil %  0.5 %    0-2  

 

 Nucleated Red Blood Cells %  0      









 Laboratory test finding  2016  HIV 1&amp;2 AB Self  Nonreactive    
Nonreactive  50



     Referred        

 

 Laboratory test finding  2016  Troponin I  0.00 ng/mL    &lt;0.03  51

 

 Comp Metabolic Panel  2016  Sodium  135 mmol/L    133-145  









 Chloride  102 mmol/L    101-111  

 

 Co2 Carbon Dioxide  25 mmol/L    22-32  

 

 Glucose  139 mg/dL  High    

 

 Blood Urea Nitrogen  14 mg/dL    6-24  

 

 Creatinine  0.64 mg/dL    0.51-0.95  

 

 BUN/Creatinine Ratio  21.9  High  8-20  

 

 Calcium  9.1 mg/dL    8.6-10.3  

 

 Total Protein  7.0 g/dL    6.4-8.9  

 

 Albumin  3.6 g/dL    3.2-5.2  

 

 Globulin  3.4 g/dL    2-4  

 

 Albumin/Globulin Ratio  1.1    1-3  

 

 Total Bilirubin  0.30 mg/dL    0.2-1.0  

 

 Alkaline Phosphatase  54 U/L      

 

 Alt  17 U/L    7-52  

 

 Egfr Non-  99.5    &gt;60  

 

 Egfr   127.9    &gt;60  52

 

 Potassium  3.9 mmol/L    3.5-5.0  

 

 Anion Gap  8 mmol/L    2-11  

 

 Ast  16 U/L    13-39  









 CBC Auto Diff  2016  White Blood Count  8.2 10^3/uL    3.5-10.8  









 Red Blood Count  4.37 10^6/uL    4.0-5.4  

 

 Hemoglobin  13.3 g/dL    12.0-16.0  

 

 Hematocrit  40 %    35-47  

 

 Mean Corpuscular Volume  91 fL    80-97  

 

 Mean Corpuscular Hemoglobin  30 pg    27-31  

 

 Mean Corpuscular HGB Conc  34 g/dL    31-36  

 

 Red Cell Distribution Width  13 %    10.5-15  

 

 Platelet Count  293 10^3/uL    150-450  

 

 Mean Platelet Volume  7 um3  Low  7.4-10.4  

 

 Abs Neutrophils  5.0 10^3/uL    1.5-7.7  

 

 Abs Lymphocytes  2.5 10^3/uL    1.0-4.8  

 

 Abs Monocytes  0.6 10^3/uL    0-0.8  

 

 Abs Eosinophils  0.1 10^3/uL    0-0.6  

 

 Abs Basophils  0.1 10^3/uL    0-0.2  

 

 Abs Nucleated RBC  0 10^3/uL      

 

 Granulocyte %  60.5 %    38-83  

 

 Lymphocyte %  29.8 %    25-47  

 

 Monocyte %  7.8 %    1-9  

 

 Eosinophil %  1.2 %    0-6  

 

 Basophil %  0.7 %    0-2  

 

 Nucleated Red Blood Cells %  0      









 1  2 sst 1 lav

 

 2  Pre-diabetes: 5.7 - 6.4



   Diabetes: &gt;6.4



   Glycemic control for adults with diabetes: &lt;7.0

 

 3  SEE RESULT BELOW



   -----------------------------------------------------------------------------
---------------



   Name:  SEVERINOMARIELOS               : 1970    Attend Dr: Felipe Hendricks MD



   Acct:  [id number]  Unit: [id number]  AGE: 47            Location:  [place] [address]



   Reg:   10/19/17      Dis:  10/20/17    SEX: F             Status:    DIS Joshua



   -----------------------------------------------------------------------------
---------------



   



   SPEC: [id number]         JACOB:   10/19/    GWEN DR: Austin Romano MD



   REQ:  [id number]              RECD:   10/19/



   STATUS: ONEIL MICHAEL DR: Lin Lima MD



   _



   SOURCE: BLOOD,VENO     SPDESC:



   ORDERED:  Blood Cult



   



   -----------------------------------------------------------------------------
---------------



   Procedure                         Result                         Reported   
        Site



   -----------------------------------------------------------------------------
---------------



   Aerobic Culture Bottle  Final                                    10/24/17-
1714      ML



   No Growth Day 5



   



   Anaerobic Culture Bottle  Final                                  10/24/17-
1714      ML



   No Growth Day 5



   



   -----------------------------------------------------------------------------
---------------



   * ML - MAIN LAB ([facility])



   .



   



   



   



   



   



   



   



   



   



   



   



   



   



   



   



   



   



   



   



   



   



   



   



   



   ** END OF REPORT **



   



   * ML=Testing performed at Main Lab



   DEPARTMENT OF PATHOLOGY,  [address] [place] [address]



   Phone # [phone]      Fax #[phone]



   David Naik M.D. Director     [facility] # [id number]

 

 4  &gt;100 to &lt;200 pg/mL: likely compensated congestive heart



   failure (CHF)



   200 to 400 pg/mL: likely moderate CHF



   &gt;400 pg/mL: likely moderate to severe CHF

 

 5  [facility] Severe Sepsis and Septic Shock Management Bundle Measure



   requires all lactic acids initially measuring &gt;2.0 mmol/L be



   repeated.

 

 6  *******Because ethnic data is not always readily available,



   this report includes an eGFR for both -Americans and



   non- Americans.****



   The National Kidney Disease Education Program (NKDEP) does



   not endorse the use of the MDRD equation for patients that



   are not between the ages of 18 and 70, are pregnant, have



   extremes of body size, muscle mass, or nutritional status,



   or are non- or non-.



   According to the National Kidney Foundation, irrespective of



   diagnosis, the stage of the disease is based on the level of



   kidney function:



   Stage Description                      GFR(mL/min/1.73 m(2))



   1     Kidney damage with normal or decreased GFR       90



   2     Kidney damage with mild decrease in GFR          60-89



   3     Moderate decrease in GFR                         30-59



   4     Severe decrease in GFR                           15-29



   5     Kidney failure                       &lt;15 (or dialysis)

 

 7  *******Because ethnic data is not always readily available,



   this report includes an eGFR for both -Americans and



   non- Americans.****



   The National Kidney Disease Education Program (NKDEP) does



   not endorse the use of the MDRD equation for patients that



   are not between the ages of 18 and 70, are pregnant, have



   extremes of body size, muscle mass, or nutritional status,



   or are non- or non-.



   According to the National Kidney Foundation, irrespective of



   diagnosis, the stage of the disease is based on the level of



   kidney function:



   Stage Description                      GFR(mL/min/1.73 m(2))



   1     Kidney damage with normal or decreased GFR       90



   2     Kidney damage with mild decrease in GFR          60-89



   3     Moderate decrease in GFR                         30-59



   4     Severe decrease in GFR                           15-29



   5     Kidney failure                       &lt;15 (or dialysis)

 

 8  Desirable &lt;150



   Borderline high 150-199



   High 200-499



   Very High &gt;500

 

 9  Desirable &lt;200



   Borderline high 200-239



   High &gt;239

 

 10  Low &lt;40



   Desirable: 40-60



   High: &gt;60

 

 11  Desirable: &lt;100 mg/dL



   Near Optimal: 100-129 mg/dL



   Borderline High: 130-159 mg/dL



   High: 160-189 mg/dL



   Very High: &gt;189 mg/dL

 

 12  Therapeutic target for the treatment of diabetes



   Mellitus patients is &lt;7% HBA1C, and in selective



   patients &lt;6.0%.Please refer to American Diabetes



   Association Diabetic care guidelines for further



   information.

 

 13  SEE RESULT BELOW



   -----------------------------------------------------------------------------
---------------



   Name:  MARIELOS SEVERINO YOB: 1970    Attend Dr: Lin Lmia MD



   Acct:  [id number]  Unit: [id number]  AGE: 47            Location:  [facility]



   Reg:   10/03/17                        SEX: F             Status:    REG REF



   -----------------------------------------------------------------------------
---------------



   



   SPEC: [id number]         JACOB:   10/03/    SUBM DR: Lin Lima MD



   REQ:  [id number]              RECD:   10/03/



   STATUS: COMP



   _



   SOURCE: URINE          SPDESC:



   ORDERED:  Urine Culture



   



   -----------------------------------------------------------------------------
---------------



   Procedure                         Result                         Reported   
        Site



   -----------------------------------------------------------------------------
---------------



   Urine Culture  Final                                             10/04/17-
1313      ML



   No Growth (&lt;1,000 CFU/mL)



   



   -----------------------------------------------------------------------------
---------------



   * ML - MAIN LAB ([facility])



   .



   



   



   



   



   



   



   



   



   



   



   



   



   



   



   



   



   



   



   



   



   



   



   



   



   



   



   ** END OF REPORT **



   



   * ML=Testing performed at Main Lab



   DEPARTMENT OF PATHOLOGY,  [address] [place] [address]



   Phone # [phone]      Fax #[phone]



   David Naik M.D. Director     [facility] # [id number]

 

 14  SRC:&lt;Blank&gt; 1urine vacutai ner

 

 15  Source of Specimen: &lt;Blank&gt; 1urine vacutai

 

 16  Escherichia coli



   Source of Specimen: &lt;Blank&gt; 1urine vacutai



   25,000-50,000 colony forming units per mL

 

 17  Source of Specimen: &lt;Blank&gt; 1urine vacutai



   ** S=Susceptible; I=Intermediate; R=Resistant **



   P=Positive; N=Negative



   MICS are expressed in micrograms per mL



   Antibiotic                 RSLT#1    RSLT#2    RSLT#3    RSLT#4



   Amoxicillin/Clavulanic Acid    S



   Ampicillin                     S



   Cefepime                       S



   Ceftriaxone                    S



   Cefuroxime                     S



   Cephalothin                    I



   Ciprofloxacin                  S



   Ertapenem                      S



   Gentamicin                     S



   Imipenem                       S



   Levofloxacin                   S



   Nitrofurantoin                 S



   Piperacillin                   S



   Tetracycline                   S



   Tobramycin                     S



   Trimethoprim/Sulfa             R

 

 18  Therapeutic concentration: &lt;50 ug/mL



   Toxic concentration: &gt;120 ug/mL

 

 19  [facility] Severe Sepsis and Septic Shock Management Bundle Measure



   requires all lactic acids initially measuring &gt;2.0 mmol/L be



   repeated.

 

 20  *******Because ethnic data is not always readily available,



   this report includes an eGFR for both -Americans and



   non- Americans.****



   The National Kidney Disease Education Program (NKDEP) does



   not endorse the use of the MDRD equation for patients that



   are not between the ages of 18 and 70, are pregnant, have



   extremes of body size, muscle mass, or nutritional status,



   or are non- or non-.



   According to the National Kidney Foundation, irrespective of



   diagnosis, the stage of the disease is based on the level of



   kidney function:



   Stage Description                      GFR(mL/min/1.73 m(2))



   1     Kidney damage with normal or decreased GFR       90



   2     Kidney damage with mild decrease in GFR          60-89



   3     Moderate decrease in GFR                         30-59



   4     Severe decrease in GFR                           15-29



   5     Kidney failure                       &lt;15 (or dialysis)

 

 21  Therapeutic concentration: &lt;50 ug/mL



   Toxic concentration: &gt;120 ug/mL

 

 22  *******Because ethnic data is not always readily available,



   this report includes an eGFR for both -Americans and



   non- Americans.****



   The National Kidney Disease Education Program (NKDEP) does



   not endorse the use of the MDRD equation for patients that



   are not between the ages of 18 and 70, are pregnant, have



   extremes of body size, muscle mass, or nutritional status,



   or are non- or non-.



   According to the National Kidney Foundation, irrespective of



   diagnosis, the stage of the disease is based on the level of



   kidney function:



   Stage Description                      GFR(mL/min/1.73 m(2))



   1     Kidney damage with normal or decreased GFR       90



   2     Kidney damage with mild decrease in GFR          60-89



   3     Moderate decrease in GFR                         30-59



   4     Severe decrease in GFR                           15-29



   5     Kidney failure                       &lt;15 (or dialysis)

 

 23  Please check this week

 

 24  Please check this week

 

 25  Please check this week

 

 26  Please check this week

 

 27  Please check this week

 

 28  Normal Range 180 to 914



   Indeterminate Range 145 to 180



   Deficient Range  &lt;145

 

 29  Please check this week

 

 30  Test Performed by:



   [facility]



   [address] [place] [address]

 

 31  -------------------ADDITIONAL INFORMATION-------------------



   The thyroglobulin antibody testing method is an



   immunoenzymatic assay manufactured by [employer]



   and performed on the [employer] .



   Values obtained from different assay methods or kits



   may be different and cannot be used interchangeably.



   The results cannot be interpreted as absolute evidence



   for the presence or absence of malignant disease.



   Test Performed by:



   [facility]



   [address] [place] [address]

 

 32  Negative for cANCA and pANCA patterns by immunofluorescence.



   -------------------ADDITIONAL INFORMATION-------------------



   This test was developed and its performance characteristics



   determined by [facility] in a manner consistent with CLIA



   requirements. This test has not been cleared or approved by



   the U.S. Food and Drug Administration.



   Test Performed by:



   [facility] - [address] [address]

 

 33  -------------------REFERENCE VALUE--------------------------



   &lt;=1.0 (Negative)

 

 34  -------------------REFERENCE VALUE--------------------------



   &lt;20.0 (Negative)

 

 35  Tests for antibodies to dsDNA and KEY antigens are not



   performed automatically unless the MIGUEL result is &gt; or=



   3.0 U.  Studies performed at [facility] indicate that



   positive MIGUEL results &lt;3.0 U are rarely accompanied by



   positive second order tests.



   Test Performed by:



   [facility] - [address] [address]

 

 36  Test Performed by:



   [facility] - [address] [address]

 

 37  -------------------REFERENCE VALUE--------------------------



   Not Applicable

 

 38  RESULT: HLA-B27 antigen was not detected.



   -------------------ADDITIONAL INFORMATION-------------------



   Method: Flow Cytometry



   Performing Laboratory CLIA# [id number]



   Test Performed by:



   [facility] - [address] [address]

 

 39  -------------------ADDITIONAL INFORMATION-------------------



   This test was developed and its performance characteristics



   determined by [facility] in a manner consistent with CLIA



   requirements. This test has not been cleared or approved by



   the U.S. Food and Drug Administration.



   Test Performed by:



   [facility] - [address] [address]

 

 40  *******Because ethnic data is not always readily available,



   this report includes an eGFR for both -Americans and



   non- Americans.****



   The National Kidney Disease Education Program (NKDEP) does



   not endorse the use of the MDRD equation for patients that



   are not between the ages of 18 and 70, are pregnant, have



   extremes of body size, muscle mass, or nutritional status,



   or are non- or non-.



   According to the National Kid[employer], irrespective of



   diagnosis, the stage of the disease is based on the level of



   kidney function:



   Stage Description                      GFR(mL/min/1.73 m(2))



   1     Kidney damage with normal or decreased GFR       90



   2     Kidney damage with mild decrease in GFR          60-89



   3     Moderate decrease in GFR                         30-59



   4     Severe decrease in GFR                           15-29



   5     Kidney failure                       &lt;15 (or dialysis)

 

 41  99th percentile=0.04 ng/mL



   



   Troponin results at [facility] and [facility] are not interchangeable.

 

 42  [facility] Severe Sepsis and Septic Shock Management Bundle Measure



   requires all lactic acids initially measuring &gt;2.0 mmol/L be



   repeated.

 

 43  *******Because ethnic data is not always readily available,



   this report includes an eGFR for both -Americans and



   non- Americans.****



   The National Kidney Disease Education Program (NKDEP) does



   not endorse the use of the MDRD equation for patients that



   are not between the ages of 18 and 70, are pregnant, have



   extremes of body size, muscle mass, or nutritional status,



   or are non- or non-.



   According to the National Kidney Foundation, irrespective of



   diagnosis, the stage of the disease is based on the level of



   kidney function:



   Stage Description                      GFR(mL/min/1.73 m(2))



   1     Kidney damage with normal or decreased GFR       90



   2     Kidney damage with mild decrease in GFR          60-89



   3     Moderate decrease in GFR                         30-59



   4     Severe decrease in GFR                           15-29



   5     Kidney failure                       &lt;15 (or dialysis)

 

 44  99th percentile=0.04 ng/mL



   



   Troponin results at [facility] and [facility] are not interchangeable.

 

 45  FASTING

 

 46  Reference Range and Interpretation:



   TnI (ng/mL)             Interpretation



   Less Than 0.03 ng/mL    Not supportive of diagnosis of MI



   0.03 - 0.50 ng/mL       Indeterminate: suggest serial



   studies if clinically indicated.



   Greater than 0.5 ng/mL  Consistent with diagnosis of MI

 

 47  &lt;5.0 Negative



   



   5.0 - 25.0  Indeterminate (Repeat testing recommended after



   72 hours)



   &gt;25.0  Positive



   



   Perimenopausal women can display HCG levels of up to 20



   mIU/mL

 

 48  *******Because ethnic data is not always readily available,



   this report includes an eGFR for both -Americans and



   non- Americans.****



   The National Kidney Disease Education Program (NKDEP) does



   not endorse the use of the MDRD equation for patients that



   are not between the ages of 18 and 70, are pregnant, have



   extremes of body size, muscle mass, or nutritional status,



   or are non- or non-.



   According to the National Kidney Foundation, irrespective of



   diagnosis, the stage of the disease is based on the level of



   kidney function:



   Stage Description                      GFR(mL/min/1.73 m(2))



   1     Kidney damage with normal or decreased GFR       90



   2     Kidney damage with mild decrease in GFR          60-89



   3     Moderate decrease in GFR                         30-59



   4     Severe decrease in GFR                           15-29



   5     Kidney failure                       &lt;15 (or dialysis)

 

 49  [facility] Severe Sepsis and Septic Shock Management Bundle Measure



   requires all lactic acids initially measuring &gt;2.0 mmol/L be



   repeated.

 

 50  It is recognized that currently available assays for the



   detection of antibodies to HIV-1 and/or HIV-2 may not detect



   all infected individuals. HIV antibodies may be undetectable



   in some stages of the infection and in some clinical



   conditions. The performance of this assay has not been



   established for populations of infants or children.



   



   Assayed by Chemiluminescence Microparticle Immunoassay on



   the Siemens Advia Centaur CP. Values obtained with different



   methods or kits cannot be used interchangeably.The



   diagnostic specificity of the ADVIA Centaur 1/O/2 Enhanced



   assay in the low risk population was 99.90% (6052/6058) with



   a 95% confidence interval of 99.78 to 99.96%.

 

 51  Reference Range and Interpretation:



   TnI (ng/mL)             Interpretation



   Less Than 0.03 ng/mL    Not supportive of diagnosis of MI



   0.03 - 0.50 ng/mL       Indeterminate: suggest serial



   studies if clinically indicated.



   Greater than 0.5 ng/mL  Consistent with diagnosis of MI

 

 52  *******Because ethnic data is not always readily available,



   this report includes an eGFR for both -Americans and



   non- Americans.****



   The National Kidney Disease Education Program (NKDEP) does



   not endorse the use of the MDRD equation for patients that



   are not between the ages of 18 and 70, are pregnant, have



   extremes of body size, muscle mass, or nutritional status,



   or are non- or non-.



   According to the National Kidney Foundation, irrespective of



   diagnosis, the stage of the disease is based on the level of



   kidney function:



   Stage Description                      GFR(mL/min/1.73 m(2))



   1     Kidney damage with normal or decreased GFR       90



   2     Kidney damage with mild decrease in GFR          60-89



   3     Moderate decrease in GFR                         30-59



   4     Severe decrease in GFR                           15-29



   5     Kidney failure                       &lt;15 (or dialysis)







Procedures







 Date  CPT Code  Description  Status

 

 2018    Mammogram  Completed

 

 2017  53420  Finger Or Heel Stick  Completed

 

 2016    Mammogram  Completed







Encounters







 Type  Date  Location  Provider  CPT E/M  Dx

 

 Office Visit  2018 10:30a  [facility]  Lin Lima M.D.  10643  
M79.671









 M54.5

 

 M79.7









 Office Visit  2017  3:00p  [facility]  Lin Lima M.D.  87373  
Z00.00









 E11.9

 

 F33.1

 

 I10

 

 E78.5

 

 Z12.31

 

 M54.5









 Office Visit  2017  6:00p  [facility]  Lindsay Lee NP  [address]  
R11.2









 R19.7

 

 E11.9









 Office Visit  10/23/2017  7:20p  [facility]  Lin Lima M.D.  81096  F33.1









 E11.9

 

 I10

 

 E78.5

 

 R07.9









 Office Visit  10/02/2017  2:00p  [facility]  Lin Lima M.D.  06958  F33.1









 M54.5

 

 E11.9

 

 E04.2

 

 M25.549









 Office Visit  2017 11:00a  [facility]  Óscar Riojas M.D.  55596  
N39.0

 

 Office Visit  2017  7:40p  [facility]  Lin Lima M.D.  12810  F33.1









 T14.91









 Office Visit  2017  4:40p  [facility]  Lin Lima M.D.  50350  
M54.5









 E11.9

 

 F33.1

 

 E04.2









 Office Visit  2017  8:00a  [facility]  Lin Lima M.D.  61879  R42









 J06.9

 

 R11.10









 Office Visit  2017 10:50a  [facility]  Lin Lima M.D.  65036  
L94.9









 I10

 

 M25.549









 Office Visit  2016 10:30a  [facility]  Lin Lima M.D.  44793  
I10









 F33.1

 

 E11.9

 

 M54.5









 Office Visit  10/17/2016  3:00p  [facility]  ROSALINDA Alejandro  [address]  J06.9

 

 Office Visit  2016  2:10p  [facility]  Lin Lima M.D.  90167  
E11.9









 F33.1

 

 M54.5

 

 I10









 Office Visit  2016  3:45p  [facility]  Anna Cameron MIRELLA  [address]  
S62.606A









 W10.8xxA









 Office Visit  2016 10:00a  [facility]  Adriana Porter [facility]  
[address]  E11.9









 F32.9

 

 M54.5







Plan of Care

2018 - Chacha Mratinez, Gabby-CJ06.9 Acute upper respiratory infection, 
eekoayheyxbC82.901 Unspecified asthma with (acute) exacerbationAllNew Medication
:Azithromycin 250 mgComments:~B_~U_Medication Management~b_~u_ Patient 
Understands medications she's taking?     Yes    No  Are there Barriers to 
Adherence?    Yes    No  Has the patient been asked about herbal supplements 
and therapies, and OTC meds?      Yes    No       ~B_~U_Care Plan~b_~u_1.  
Patient has been queried about patient's goals/preferences and functional/
lifestyle goals at relevant visits.  If relevant, describe: na2.  Treatment 
goals as explained to the patient: abovesx resolution 3.  Are there barriers to 
meeting treatment goals?  Yes    No      If Yes, please describe:4.  Self-
Management goals as described tothe patient:  Yes    No conts sx rx; rest, 
fluidsprn albuterol and z pack f/u if no better with above or if sx worsen

## 2018-03-01 NOTE — XMS REPORT
Marielos Dumont

 Created on:2018



Patient:Marielos Dumont

Sex:Female

:1970

External Reference #:[id number]





Demographics







 Address  [address] [place] [id number]

 

 Mobile Phone  [phone]

 

 Email Address  [email or web address]

 

 Preferred Language  English

 

 Marital Status   Or 

 

 Mandaeism Affiliation  Unknown

 

 Race  Other Race

 

 Ethnic Group   Or 









Author







 Organization  [facility]

 

 Address  [address] [place] [address]

 

 Phone  [phone]









Support







 Name  Relationship  Address  Phone

 

 Raisa Knight  Daughter  Unavailable  [phone]

 

 Anshul Gallagher  Son  Unavailable  [phone]









Care Team Providers







 Name  Role  Phone

 

 Lin Lima M.D.  Care Team Information   Unavailable

 

 Lin Lima M.D.  Primary Care Physician  Unavailable









Payers







 Type  Date  Identification Numbers  Payment Provider  Subscriber

 

 Medicaid  Effective:  Policy Number: [id number]  [facility]  Marielos Dumont



   2017      









 PayID: [id number]  [address] [address]









 [place] [address]









 Medicaid  Effective: 2017  Policy Number: [id number]  [place]icaid [place]  Marielos Dumont









 Expires: 2017  PayID: [id number]  [address]









 [place] [address]







Problems







 Date  Description  Provider  Status

 

 Onset: 2016  Type 2 diabetes mellitus  ROSALINDA Bryan  Active

 

 Onset: 2016  Low back pain  ROSALINDA Bryan  Active

 

 Onset: 2016  Moderate recurrent major depression  Lin Lima M.D.  
Active

 

 Onset: 2016  Chronic back pain  Lni Lima M.D.  Active

 

 Onset: 2016  Essential hypertension  Lin Lima M.D.  Active

 

 Onset: 2016  Fibromyalgia  Lin Lima M.D.  Active

 

 Onset: 2017  H/O: attempted suicide  Lin Lima M.D.  Active









   Note: narcotic overdose









 Onset: 2017  Hyperlipidemia  Lin Lima M.D.  Active







Family History







 Date  Family Member(s)  Problem(s)  Comments

 

   Father   due to cholecystitis  ()

 

   Mother  Cancer  thyroid

 

   First Son  Prader Willi  

 

   First Son  Developmentally disabled  







Social History







 Type  Date  Description  Comments

 

 Lives With    Daughter  

 

 Lives With    Son  

 

 Lives With    Boyfriend  

 

 Occupation    Not Currently Working  

 

 Occupation    Disabled  

 

 Cigarette Use    Never Smoked Cigarettes  

 

 ETOH Use    Denies alcohol use  

 

 Smoking    Patient has never smoked  

 

 Exercise Type/Frequency    Exercises rarely  







Allergies, Adverse Reactions, Alerts







 Date  Description  Reaction  Status  Severity  Comments

 

 2016  Motrin  Nausea and Vomiting  active    







Medications







 Medication  Date  Status  Form  Strength  Qnty  SIG  Indications  Ordering



                 Provider

 

 Methocarbamol    Active  Tablets  750mg  60tab  1 tab by            s  mouth    Choudrant,



             twice a    M.D.



             day as    



             needed    

 

 Nitroglycerin  10/23  Active  Tablets  0.4mg  14tab  1 sl q5        Sub    s  min x 3    Choudrant,



             doses as    M.D.



             needed for    



             chest    



             pain; if    



             no relief    



             after 3    



             doses call    



             911    

 

 Lisinopril  10/23  Active  Tablets  10mg  30tab  1 by mouth  I10          s  every day    Clarence



                 M.D.

 

 Ondansetron    Active  Tablets  4mg  30tab  1 tab by  R11.10      Dispers    s  mouth    Choudrant,



             every 6    M.D.



             hours as    



             needed    

 

 Metoclopramide HCL    Active  Tablets  10mg  30tab  take 1  R11.10      Dispers    s  tablet by    Choudrant,



             mouth    M.D.



             every 8    



             hours as    



             needed for    



             nausea    

 

 Diphenhydramine    Active  Tablets  25mg  60tab  1-2 tabs  R11.10  Lin



 HCL  /2017        s  by mouth    Choudrant,



             every 6    M.D.



             hours as    



             needed    

 

 Zofran Odt    Active  Tablets  4mg  30tab  dissolve 1        Dispers    s  tab under    Choudrant,



             tongue    M.D.



             every 6    



             hours as    



             needed    



             nausea    

 

 Onetouch Ultra 2  10/24  Active  Kit  w/Device  1unit  test blood    Óscar J.



   /        s  sugar once    Breiman,



             a day or    M.D.



             as    



             directed    



             dx: e11.9    



             last    



             office    



             visit    



             10/19/16    

 

 Ventolin HFA  10/17  Active  Aerosol  108(90Bas  18uni  inhale two  J06.9  
      e)  ts  puffs by    Juan,



         mcg/Act    mouth qid    Afnp-C



             prn    



             cough/whee    



             ze    

 

 Januvia    Active  Tablets  50mg  30tab  1 tab by            s  mouth    Choudrant,



             every day    M.D.

 

 Oxycodone HCL    Active  Tablets  15mg    tid as    Unknown



             needed Dr Samson    

 

 Valium    Active  Tablets  10mg    as needed    Unknown



   /0000          Dr Vera    

 

 Albuterol Sulfate    Active  Nebulizer  1.25mg/3M    3ml per    Unknown



         L    nebulizer    



             every 6    



             hours as    



             needed    

 

 Singulair    Active  Tablets  10mg    1 by mouth    Unknown



             every day    

 

 Zyrtec Allergy    Active  Tablets  10mg    prn    Unknown



   /0000              

 

 Effexor XR    Active  Caps ER  150mg    1 by mouth    Unknown



       24HR      every day    



             along with    



             75 mg    

 

 Effexor XR    Active  Caps ER  75mg    1 by mouth    Unknown



       24HR      every day    



             along with    



             150 mg    

 

 Gabapentin    Active  Capsules  300mg    1 po qid    Unknown



   /0000              

 

 Atorvastatin    Active  Tablets  10mg  30tab  1 by mouth    Lin



 Calcium  /0000        s  every day    ADELE Lima

 

 Cyanocobalamin    Active  Solution  1000mcg/M    Dr Samson    Unknown



   /0000      L        

 

 Abilify    Active  Tablets  2mg    1 by mouth    Unknown



             every    



             morning    

 

                 

 

 Azithromycin    Hx  Tablets  250mg  6tabs  2 by mouth              every day    Hilsdorf,



   -          x1 then 1    Afnp-C



             by mouth    



             every day    



             x 4 more    



             days    

 

 Cyclobenzaprine    Hx  Tablets  10mg  90tab  take one    Lin



 HCL  /2017        s  tablet by    Choudrant,



   -          mouth    M.D.



             every           hours as    



             needed    

 

 Bactrim DS    Hx  Tablets  800-160mg  10tab  1 by mouth    Óscar LAI



           s  twice a    Shine,



   -          day    M.D.



                 

 

 Azithromycin  10/17  Hx  Tablets  250mg  12tab  take 2  J06.9  Anna



           s  tablets by    Nisha,



   -          mouth x 3d    FNP



             then take    



             1 tablet    



             daily for    



             next 6    



             days    

 

 Januvia    Hx  Tablets  100mg  30tab  1 by mouth            s  every day    Choudrant,



   -              M.D.



                 

 

 Sphygmomanometer    Hx  Misc      take blood  I10            pressure    Choudrant,



   -          daily    M.D.



                 

 

 Truetrack Test    Hx  Strips    100un  test once  E11.9          its  a day Dx:    Choudrant,



   -          E11.9    M.D.



   10/24          Last    



   /2016          office    



             16    

 

 Lisinopril    Hx  Tablets  5mg  30tab  Take One  I10          s  Tablet By    Choudrant,



   -          Mouth    M.D.



   10/23          Every Day    



   /2017              

 

 Effexor XR  00/00  Hx  Caps ER  37.5mg    1 by mouth    Unknown



   /0000    24HR      qd w/75mg    



   -              



                 

 

 Effexor XR  00/00  Hx  Caps ER  75mg    1 by mouth    Unknown



   /0000    24HR      every day    



   -          w/37.5    



                 

 

 Albuterol Sulfate  00  Hx  Powder      2 puffs    Unknown



   /0000          every 4    



   -          hours as    



                 

 

 Effexor XR  00/00  Hx  Caps ER  150mg    1 by mouth    Unknown



   /0000    24HR      every day    



   -              



                 

 

 Lipitor  00/00  Hx  Tablets  20mg    1 by mouth    Unknown



   /0000          every day    



   -              



   10/23              



   /2017              







Vital Signs







 Date  Vital  Result  Comment

 

 2018  BP Systolic  136 mmHg  









 BP Diastolic  78 mmHg  

 

 Heart Rate  76 /min  

 

 Body Temperature  99.1 F  

 

 Respiratory Rate  16 /min  

 

 Height  61 inches  5'1"

 

 Weight  161.00 lb  

 

 BMI (Body Mass Index)  30.4 kg/m2  









 2018  BP Systolic  124 mmHg  









 BP Diastolic  80 mmHg  

 

 Heart Rate  84 /min  

 

 Body Temperature  98.4 F  

 

 Respiratory Rate  16 /min  

 

 Height  61 inches  5'1"

 

 Weight  174.12 lb  

 

 BMI (Body Mass Index)  32.9 kg/m2  









 2018  BP Systolic  144 mmHg  









 BP Diastolic  84 mmHg  

 

 Heart Rate  74 /min  

 

 Body Temperature  98.1 F  

 

 Respiratory Rate  16 /min  

 

 Height  61 inches  5'1"

 

 Weight  173.00 lb  

 

 BMI (Body Mass Index)  32.7 kg/m2  









 2017  BP Systolic  120 mmHg  









 BP Diastolic  80 mmHg  

 

 Heart Rate  74 /min  

 

 Body Temperature  98.8 F  

 

 Respiratory Rate  16 /min  

 

 Height  61 inches  5'1"

 

 Weight  170.00 lb  

 

 BMI (Body Mass Index)  32.1 kg/m2  









 2017  BP Systolic  120 mmHg  









 BP Diastolic  80 mmHg  

 

 Heart Rate  68 /min  

 

 Body Temperature  98.6 F  

 

 Respiratory Rate  18 /min  

 

 Height  61 inches  5'1"

 

 Weight  166.00 lb  

 

 BMI (Body Mass Index)  31.4 kg/m2  









 10/23/2017  BP Systolic  110 mmHg  









 BP Diastolic  70 mmHg  

 

 Heart Rate  76 /min  

 

 Body Temperature  98.8 F  

 

 Respiratory Rate  18 /min  

 

 Height  61 inches  5'1"

 

 Weight  167.00 lb  

 

 BMI (Body Mass Index)  31.6 kg/m2  









 10/02/2017  BP Systolic  120 mmHg  









 BP Diastolic  60 mmHg  

 

 Heart Rate  84 /min  

 

 Body Temperature  97.9 F  

 

 Respiratory Rate  16 /min  

 

 Height  61 inches  5'1"

 

 Weight  166.38 lb  

 

 BMI (Body Mass Index)  31.4 kg/m2  









 2017  BP Systolic  134 mmHg  









 BP Diastolic  78 mmHg  

 

 Heart Rate  80 /min  

 

 Body Temperature  98.6 F  

 

 Respiratory Rate  18 /min  

 

 Height  61 inches  5'1"

 

 Weight  163.00 lb  

 

 BMI (Body Mass Index)  30.8 kg/m2  









 2017  BP Systolic  132 mmHg  









 BP Diastolic  78 mmHg  

 

 Heart Rate  84 /min  

 

 Body Temperature  98.0 F  

 

 Respiratory Rate  16 /min  

 

 Height  61 inches  5'1"

 

 Weight  163.00 lb  

 

 BMI (Body Mass Index)  30.8 kg/m2  









 2017  BP Systolic  130 mmHg  









 BP Diastolic  80 mmHg  

 

 Heart Rate  76 /min  

 

 Respiratory Rate  16 /min  

 

 Height  61 inches  5'1"

 

 Weight  160.00 lb  

 

 BMI (Body Mass Index)  30.2 kg/m2  









 2017  BP Systolic  100 mmHg  









 BP Diastolic  70 mmHg  

 

 Heart Rate  88 /min  

 

 Body Temperature  98.6 F  

 

 Respiratory Rate  16 /min  

 

 Height  61 inches  5'1"

 

 Weight  151.00 lb  

 

 BMI (Body Mass Index)  28.5 kg/m2  









 2017  BP Systolic  130 mmHg  









 BP Diastolic  72 mmHg  

 

 Heart Rate  84 /min  

 

 Body Temperature  97.3 F  

 

 Height  61 inches  5'1"

 

 Weight  160.00 lb  

 

 BMI (Body Mass Index)  30.2 kg/m2  









 2016  BP Systolic  118 mmHg  









 BP Diastolic  76 mmHg  

 

 Heart Rate  78 /min  

 

 Body Temperature  98.2 F  

 

 Respiratory Rate  16 /min  

 

 Height  61 inches  5'1"

 

 Weight  158.00 lb  

 

 BMI (Body Mass Index)  29.9 kg/m2  









 10/17/2016  BP Systolic  110 mmHg  









 BP Diastolic  60 mmHg  

 

 Heart Rate  80 /min  

 

 Body Temperature  98.8 F  

 

 Respiratory Rate  18 /min  

 

 O2 % BldC Oximetry  98 %  

 

 Height  61 inches  5'1"

 

 Weight  145.00 lb  

 

 BMI (Body Mass Index)  27.4 kg/m2  









 2016  BP Systolic  120 mmHg  









 BP Diastolic  70 mmHg  

 

 Heart Rate  84 /min  

 

 Body Temperature  97.7 F  

 

 Respiratory Rate  16 /min  

 

 Height  61 inches  5'1"

 

 Weight  145.00 lb  

 

 BMI (Body Mass Index)  27.4 kg/m2  









 2016  BP Systolic  120 mmHg  









 BP Diastolic  76 mmHg  

 

 Heart Rate  80 /min  

 

 Body Temperature  98.2 F  

 

 Respiratory Rate  16 /min  

 

 Height  61 inches  5'1"

 

 Weight  143.00 lb  

 

 BMI (Body Mass Index)  27.0 kg/m2  









 2016  BP Systolic  118 mmHg  









 BP Diastolic  80 mmHg  

 

 Heart Rate  80 /min  

 

 Body Temperature  98.4 F  

 

 Respiratory Rate  16 /min  

 

 Height  61 inches  5'1"

 

 Weight  143.25 lb  

 

 BMI (Body Mass Index)  27.1 kg/m2  







Results







 Test  Date  Test  Result  H/L  Range  Note

 

 Laboratory test finding  2017  TSH  1.400 uIU/mL    0.450-4.500  1

 

 Metabolic Panel (14),  2017  Glucose, Serum  106 mg/dL  High  65-99  1



 Comprehensive            









 BUN  10 mg/dL    6-24  1

 

 Creatinine, Serum  0.61 mg/dL    0.57-1.00  1

 

 eGFR If NonAfricn Am  108 mL/min/1.73    &gt;59  1

 

 eGFR If Africn Am  124 mL/min/1.73    &gt;59  1

 

 BUN/Creatinine Ratio  16    9-23  1

 

 Sodium, Serum  143 mmol/L    134-144  1

 

 Potassium, Serum  4.2 mmol/L    3.5-5.2  1

 

 Chloride, Serum  103 mmol/L      1

 

 Carbon Dioxide, Total  25 mmol/L    18-29  1

 

 Calcium, Serum  9.2 mg/dL    8.7-10.2  1

 

 Protein, Total, Serum  7.4 g/dL    6.0-8.5  1

 

 Albumin, Serum  4.1 g/dL    3.5-5.5  1

 

 Globulin, Total  3.3 g/dL    1.5-4.5  1

 

 A/G Ratio  1.2    1.2-2.2  1

 

 Bilirubin, Total  &lt;0.2 mg/dL    0.0-1.2  1

 

 Alkaline Phosphatase, S  80 IU/L      1

 

 Ast (Sgot)  21 IU/L    0-40  1

 

 Alt (SGPT)  18 IU/L    0-32  1









 Lipid Panel  2017  Cholesterol, Total  263 mg/dL  High  100-199  1









 Triglycerides  124 mg/dL    0-149  1

 

 HDL Cholesterol  65 mg/dL    &gt;39  1

 

 VLDL Cholesterol David  25 mg/dL    5-40  1

 

 LDL Cholesterol Calc  173 mg/dL  High  0-99  1

 

 Comment:  TNP      1









 CBC With Differential/Platelet  2017  WBC  6.2 x10E3/uL    3.4-10.8  1









 RBC  4.33 x10E6/uL    3.77-5.28  1

 

 Hemoglobin  12.8 g/dL    11.1-15.9  1

 

 Hematocrit  39.0 %    34.0-46.6  1

 

 MCV  90 fL    79-97  1

 

 MCH  29.6 pg    26.6-33.0  1

 

 MCHC  32.8 g/dL    31.5-35.7  1

 

 RDW  14.3 %    12.3-15.4  1

 

 Platelets  402 x10E3/uL  High  150-379  1

 

 Neutrophils  58 %    Not Estab.  1

 

 Lymphs  32 %    Not Estab.  1

 

 Monocytes  8 %    Not Estab.  1

 

 Eos  2 %    Not Estab.  1

 

 Basos  0 %    Not Estab.  1

 

 Immature Cells  TNP      1

 

 Neutrophils (Absolute)  3.6 x10E3/uL    1.4-7.0  1

 

 Lymphs (Absolute)  2.0 x10E3/uL    0.7-3.1  1

 

 Monocytes(Absolute)  0.5 x10E3/uL    0.1-0.9  1

 

 Eos (Absolute)  0.1 x10E3/uL    0.0-0.4  1

 

 Baso (Absolute)  0.0 x10E3/uL    0.0-0.2  1

 

 Immature Granulocytes  0 %    Not Estab.  1

 

 Immature Grans (Abs)  0.0 x10E3/uL    0.0-0.1  1

 

 NRBC  TNP      1

 

 Hematology Comments:  TNP      1









 Hemoglobin A1c  2017  Hemoglobin A1c  5.6 %    4.8-5.6  1, 2

 

 Laboratory test  2017  Thyroxine (T4) Free,  1.09 ng/dL    0.82-1.77  1



 finding    Direct, S        

 

 Laboratory test  2017  Glucose Serum  106  High    



 finding            

 

 Laboratory test  10/19/2017  TSH (Thyroid Stim  3.10 mcIU/mL    0.34-5.60  



 finding    Horm)        









 Blood Culture  SEE RESULT BELOW      3









 CBC Auto Diff  10/19/2017  White Blood Count  10.2 10^3/uL    3.5-10.8  









 Red Blood Count  4.29 10^6/uL    4.0-5.4  

 

 Hemoglobin  12.9 g/dL    12.0-16.0  

 

 Hematocrit  38 %    35-47  

 

 Mean Corpuscular Volume  89 fL    80-97  

 

 Mean Corpuscular Hemoglobin  30 pg    27-31  

 

 Mean Corpuscular HGB Conc  34 g/dL    31-36  

 

 Red Cell Distribution Width  14 %    10.5-15  

 

 Platelet Count  414 10^3/uL    150-450  

 

 Mean Platelet Volume  7 um3  Low  7.4-10.4  

 

 Abs Neutrophils  7.5 10^3/uL    1.5-7.7  

 

 Abs Lymphocytes  1.8 10^3/uL    1.0-4.8  

 

 Abs Monocytes  0.9 10^3/uL  High  0-0.8  

 

 Abs Eosinophils  0 10^3/uL    0-0.6  

 

 Abs Basophils  0.1 10^3/uL    0-0.2  

 

 Abs Nucleated RBC  0 10^3/uL      

 

 Granulocyte %  73.2 %    38-83  

 

 Lymphocyte %  17.5 %  Low  25-47  

 

 Monocyte %  8.4 %    1-9  

 

 Eosinophil %  0.2 %    0-6  

 

 Basophil %  0.7 %    0-2  

 

 Nucleated Red Blood Cells %  0      









 Laboratory test finding  10/19/2017  B-Type Natriuretic Peptide BNP  47 pg/mL 
     4









 Lactic Acid  1.3 mmol/L    0.5-2.0  5









 Comp Metabolic Panel  10/19/2017  Sodium  136 mmol/L    133-145  









 Potassium  3.5 mmol/L    3.5-5.0  

 

 Chloride  103 mmol/L    101-111  

 

 Co2 Carbon Dioxide  26 mmol/L    22-32  

 

 Anion Gap  7 mmol/L    2-11  

 

 Glucose  91 mg/dL      

 

 Blood Urea Nitrogen  15 mg/dL    6-24  

 

 Creatinine  0.62 mg/dL    0.51-0.95  

 

 BUN/Creatinine Ratio  24.2  High  8-20  

 

 Calcium  9.2 mg/dL    8.6-10.3  

 

 Total Protein  7.3 g/dL    6.4-8.9  

 

 Albumin  3.8 g/dL    3.2-5.2  

 

 Globulin  3.5 g/dL    2-4  

 

 Albumin/Globulin Ratio  1.1    1-3  

 

 Total Bilirubin  0.30 mg/dL    0.2-1.0  

 

 Alkaline Phosphatase  63 U/L      

 

 Alt  21 U/L    7-52  

 

 Ast  15 U/L    13-39  

 

 Egfr Non-  102.7    &gt;60  

 

 Egfr   132.1    &gt;60  6









 Laboratory test finding  10/19/2017  Magnesium  2.1 mg/dL    1.9-2.7  









 Creatine Kinase(CK)  25 U/L      

 

 Troponin I  0.00 ng/mL    &lt;0.04  









 CKMB  10/19/2017  CKMB  ng/mL  0.7 ng/mL    0.6-6.3  

 

 CBC No Diff  10/03/2017  White Blood Count  17.4 10^3/uL  High  3.5-10.8  









 Red Blood Count  4.29 10^6/uL    4.0-5.4  

 

 Hemoglobin  13.0 g/dL    12.0-16.0  

 

 Hematocrit  39 %    35-47  

 

 Mean Corpuscular Volume  90 fL    80-97  

 

 Mean Corpuscular Hemoglobin  30 pg    27-31  

 

 Mean Corpuscular HGB Conc  34 g/dL    31-36  

 

 Red Cell Distribution Width  14 %    10.5-15  

 

 Platelet Count  383 10^3/uL    150-450  

 

 Mean Platelet Volume  8 um3    7.4-10.4  









 Urinalysis Profile  10/03/2017  Urine Color  Helen      









 Urine Appearance  Turbid      

 

 Urine Specific Gravity  1.028    1.010-1.030  

 

 Urine pH  5.0    5-9  

 

 Urine Urobilinogen  Negative    Negative  

 

 Urine Ketones  Trace    Negative  

 

 Urine Protein  2+(100 mg/dL)    Negative  

 

 Urine Leukocytes  Negative    Negative  

 

 Urine Blood  Negative    Negative  

 

 Urine Nitrite  Negative    Negative  

 

 Urine Bilirubin  Negative    Negative  

 

 Urine Glucose  Negative    Negative  

 

 Urine White Blood Cell  2+(11-20/hpf)    Absent  

 

 Urine Red Blood Cell  3+(&gt;10/hpf)    Absent  

 

 Urine Bacteria  3+    Absent  

 

 Urine Squamous Epithelial Cell  Present    Absent  

 

 Urine Yeast  Present    Absent  









 Comp Metabolic Panel  10/03/2017  Sodium  138 mmol/L    133-145  









 Potassium  4.3 mmol/L    3.5-5.0  

 

 Chloride  104 mmol/L    101-111  

 

 Co2 Carbon Dioxide  27 mmol/L    22-32  

 

 Anion Gap  7 mmol/L    2-11  

 

 Glucose  81 mg/dL      

 

 Blood Urea Nitrogen  11 mg/dL    6-24  

 

 Creatinine  0.64 mg/dL    0.51-0.95  

 

 BUN/Creatinine Ratio  17.2    8-20  

 

 Calcium  9.7 mg/dL    8.6-10.3  

 

 Total Protein  7.2 g/dL    6.4-8.9  

 

 Albumin  3.9 g/dL    3.2-5.2  

 

 Globulin  3.3 g/dL    2-4  

 

 Albumin/Globulin Ratio  1.2    1-3  

 

 Total Bilirubin  0.20 mg/dL    0.2-1.0  

 

 Alkaline Phosphatase  63 U/L      

 

 Alt  15 U/L    7-52  

 

 Ast  15 U/L    13-39  

 

 Egfr Non-  99.0    &gt;60  

 

 Egfr   127.4    &gt;60  7









 Lipid Profile (Trig/Chol/HDL)  10/03/2017  Triglycerides  60 mg/dL      8









 Cholesterol  228 mg/dL      9

 

 HDL Cholesterol  67.9 mg/dL      10

 

 LDL Cholesterol  148 mg/dL      11









 Laboratory test finding  10/03/2017  Thyroxine  10.33 g/mL    6.09-12.23  









 TSH (Thyroid Stim Horm)  0.75 mcIU/mL    0.34-5.60  









 Urine Microalbumin Random  10/03/2017  Ur Microalbumin (mg/L)  82.3 mg/L      









 Urine Creatinine  361.76 mg/dL      

 

 Urine Microalbumin/Creatinine  22.7 ug/mg    &lt;31  









 Laboratory test finding  10/03/2017  Hemoglobin A1c (Glyco  5.8 %    Less than 
6.0  12



     HGB)        









 Urine Culture And Sensitivities  SEE RESULT BELOW      13









 Ua - Non Micro (Fma)  2017  Appearance  clear      









 Color  yellow      

 

 Glucose, Urine (Fma/CMC/CTX)  neg      

 

 Bilirubin  neg      

 

 Ketones  neg      

 

 SP Grav  &gt;=1.030      

 

 Blood  neg      

 

 PH  5.5      

 

 Protein  neg      

 

 Urobil  0.2      

 

 Nitrite  neg      

 

 Leukocytes (Fma/CMC/Centrex)  neg      









 Urine Culture Routine  2017  Urine Culture, Routine  Final report      14
, 15









 Result 1  Escherichia coli      14, 16

 

 Antimicrobial Susceptibility  See Comment:      14, 17









 Laboratory test finding  2017  Acetaminophen  &lt; 15 g/mL      18

 

 CBC Auto Diff  2017  White Blood Count  8.7 10^3/uL    3.5-10.8  









 Red Blood Count  4.46 10^6/uL    4.0-5.4  

 

 Hemoglobin  13.3 g/dL    12.0-16.0  

 

 Hematocrit  40 %    35-47  

 

 Mean Corpuscular Volume  90 fL    80-97  

 

 Mean Corpuscular Hemoglobin  30 pg    27-31  

 

 Mean Corpuscular HGB Conc  34 g/dL    31-36  

 

 Red Cell Distribution Width  14 %    10.5-15  

 

 Platelet Count  342 10^3/uL    150-450  

 

 Mean Platelet Volume  7 um3  Low  7.4-10.4  

 

 Abs Neutrophils  5.8 10^3/uL    1.5-7.7  

 

 Abs Lymphocytes  2.1 10^3/uL    1.0-4.8  

 

 Abs Monocytes  0.7 10^3/uL    0-0.8  

 

 Abs Eosinophils  0.1 10^3/uL    0-0.6  

 

 Abs Basophils  0.1 10^3/uL    0-0.2  

 

 Abs Nucleated RBC  0 10^3/uL      

 

 Granulocyte %  66.8 %    38-83  

 

 Lymphocyte %  24.0 %  Low  25-47  

 

 Monocyte %  7.9 %    1-9  

 

 Eosinophil %  0.7 %    0-6  

 

 Basophil %  0.6 %    0-2  

 

 Nucleated Red Blood Cells %  0      









 Laboratory test finding  2017  Lactic Acid  1.1 mmol/L    0.5-2.0  19

 

 Comp Metabolic Panel  2017  Sodium  135 mmol/L    133-145  









 Potassium  3.7 mmol/L    3.5-5.0  

 

 Chloride  99 mmol/L  Low  101-111  

 

 Co2 Carbon Dioxide  26 mmol/L    22-32  

 

 Anion Gap  10 mmol/L    2-11  

 

 Glucose  126 mg/dL  High    

 

 Blood Urea Nitrogen  13 mg/dL    6-24  

 

 Creatinine  0.74 mg/dL    0.51-0.95  

 

 BUN/Creatinine Ratio  17.6    8-20  

 

 Calcium  9.3 mg/dL    8.6-10.3  

 

 Total Protein  7.9 g/dL    6.4-8.9  

 

 Albumin  4.1 g/dL    3.2-5.2  

 

 Globulin  3.8 g/dL    2-4  

 

 Albumin/Globulin Ratio  1.1    1-3  

 

 Total Bilirubin  0.40 mg/dL    0.2-1.0  

 

 Alkaline Phosphatase  71 U/L      

 

 Alt  16 U/L    7-52  

 

 Ast  20 U/L    13-39  

 

 Egfr Non-  83.8    &gt;60  

 

 Egfr   107.7    &gt;60  20









 Laboratory test finding  2017  Acetaminophen  &lt; 15 g/mL      21









 Alcohol  &lt; 10 mg/dL    &lt;10  

 

 Salicylate  &lt; 2.50 mg/dL    &lt;30  









 CBC Auto Diff  03/10/2017  White Blood Count  5.3 10^3/uL    3.5-10.8  









 Red Blood Count  4.55 10^6/uL    4.0-5.4  

 

 Hemoglobin  13.5 g/dL    12.0-16.0  

 

 Hematocrit  41 %    35-47  

 

 Mean Corpuscular Volume  90 fL    80-97  

 

 Mean Corpuscular Hemoglobin  30 pg    27-31  

 

 Mean Corpuscular HGB Conc  33 g/dL    31-36  

 

 Red Cell Distribution Width  14 %    10.5-15  

 

 Platelet Count  349 10^3/uL    150-450  

 

 Mean Platelet Volume  8 um3    7.4-10.4  

 

 Abs Neutrophils  3.2 10^3/uL    1.5-7.7  

 

 Abs Lymphocytes  1.5 10^3/uL    1.0-4.8  

 

 Abs Monocytes  0.5 10^3/uL    0-0.8  

 

 Abs Eosinophils  0.1 10^3/uL    0-0.6  

 

 Abs Basophils  0 10^3/uL    0-0.2  

 

 Abs Nucleated RBC  0 10^3/uL      

 

 Granulocyte %  59.6 %    38-83  

 

 Lymphocyte %  28.0 %    25-47  

 

 Monocyte %  10.2 %  High  1-9  

 

 Eosinophil %  1.7 %    0-6  

 

 Basophil %  0.5 %    0-2  

 

 Nucleated Red Blood Cells %  0      









 Comp Metabolic Panel  03/10/2017  Sodium  138 mmol/L    133-145  









 Potassium  4.1 mmol/L    3.5-5.0  

 

 Chloride  102 mmol/L    101-111  

 

 Co2 Carbon Dioxide  29 mmol/L    22-32  

 

 Anion Gap  7 mmol/L    2-11  

 

 Glucose  83 mg/dL      

 

 Blood Urea Nitrogen  14 mg/dL    6-24  

 

 Creatinine  0.66 mg/dL    0.51-0.95  

 

 BUN/Creatinine Ratio  21.2  High  8-20  

 

 Calcium  9.2 mg/dL    8.6-10.3  

 

 Total Protein  6.8 g/dL    6.4-8.9  

 

 Albumin  3.9 g/dL    3.2-5.2  

 

 Globulin  2.9 g/dL    2-4  

 

 Albumin/Globulin Ratio  1.3    1-3  

 

 Total Bilirubin  0.50 mg/dL    0.2-1.0  

 

 Alkaline Phosphatase  61 U/L      

 

 Alt  22 U/L    7-52  

 

 Ast  15 U/L    13-39  

 

 Egfr Non-  96.0    &gt;60  

 

 Egfr   123.5    &gt;60  22









 Laboratory test finding  03/10/2017  Magnesium  2.0 mg/dL    1.9-2.7  23









 Creatine Kinase(CK)  31 U/L      24

 

 TSH (Thyroid Stim Horm)  1.86 mcIU/mL    0.34-5.60  25

 

 Thyroperoxidase AB  608.12 IU/mL  High  &lt;9  26

 

 Folic Acid (Folate)  13.65 ng/mL    &gt;3.99  27

 

 Vitamin B12  386 pg/mL    180-914  28

 

 Vitamin D Total 25(Oh)  20.0 ng/mL  Low  30-50  29

 

 Angiotensin Converting Enzyme  20 U/L    8 - 53  30

 

 Thyroglobulin AB  40 IU/mL    &lt;4.0  31









 Anca AB Ser If  03/10/2017  C-Anca  Negative    Negative  









 P-Anca  Negative    Negative  32









 Connective Tissue Panel  03/10/2017  Anti-Nuclear Antibody  0.6 U      33









 Cyclic Citrullinated Peptide  &lt;15.6 U      34

 

 Interpretation  See Comment      35









 Laboratory test finding  03/10/2017  Rheumatoid Factor  &lt;15 IU/mL    &lt;15
  36

 

 Hla B27  03/10/2017  Hla B27  Negative      37









 Hla B27 Interp  See Comment      38









 Laboratory test finding  03/10/2017  Vitamin D, 1,25 Dihydroxy  78 pg/mL    18-
78  39

 

 Laboratory test finding  2017  Potassium Redraw  3.7 mmol/L    3.5-5.0  









 Ast Redraw  22 U/L    13-39  









 Comp Metabolic Panel  2017  Sodium  134 mmol/L    133-145  









 Chloride  103 mmol/L    101-111  

 

 Co2 Carbon Dioxide  24 mmol/L    22-32  

 

 Glucose  131 mg/dL  High    

 

 Blood Urea Nitrogen  9 mg/dL    6-24  

 

 Creatinine  0.65 mg/dL    0.51-0.95  

 

 BUN/Creatinine Ratio  13.8    8-20  

 

 Calcium  9.4 mg/dL    8.6-10.3  

 

 Total Protein  7.6 g/dL    6.4-8.9  

 

 Albumin  4.0 g/dL    3.2-5.2  

 

 Globulin  3.6 g/dL    2-4  

 

 Albumin/Globulin Ratio  1.1    1-3  

 

 Total Bilirubin  0.50 mg/dL    0.2-1.0  

 

 Alkaline Phosphatase  66 U/L      

 

 Alt  21 U/L    7-52  

 

 Egfr Non-  97.7    &gt;60  

 

 Egfr   125.7    &gt;60  40

 

 Ast  22 U/L    13-39  

 

 Potassium  3.6 mmol/L    3.5-5.0  

 

 Anion Gap  7 mmol/L    2-11  









 Laboratory test finding  2017  Magnesium  1.9 mg/dL    1.9-2.7  









 Troponin I  0.00 ng/mL    &lt;0.04  41









 CBC Auto Diff  2017  White Blood Count  6.7 10^3/uL    3.5-10.8  









 Red Blood Count  4.66 10^6/uL    4.0-5.4  

 

 Hemoglobin  14.0 g/dL    12.0-16.0  

 

 Hematocrit  42 %    35-47  

 

 Mean Corpuscular Volume  89 fL    80-97  

 

 Mean Corpuscular Hemoglobin  30 pg    27-31  

 

 Mean Corpuscular HGB Conc  34 g/dL    31-36  

 

 Red Cell Distribution Width  14 %    10.5-15  

 

 Platelet Count  389 10^3/uL    150-450  

 

 Mean Platelet Volume  7 um3  Low  7.4-10.4  

 

 Abs Neutrophils  5.0 10^3/uL    1.5-7.7  

 

 Abs Lymphocytes  0.7 10^3/uL  Low  1.0-4.8  

 

 Abs Monocytes  0.9 10^3/uL  High  0-0.8  

 

 Abs Eosinophils  0.1 10^3/uL    0-0.6  

 

 Abs Basophils  0 10^3/uL    0-0.2  

 

 Abs Nucleated RBC  0 10^3/uL      

 

 Granulocyte %  75.5 %    38-83  

 

 Lymphocyte %  10.1 %  Low  25-47  

 

 Monocyte %  12.8 %  High  1-9  

 

 Eosinophil %  1.2 %    0-6  

 

 Basophil %  0.4 %    0-2  

 

 Nucleated Red Blood Cells %  0      









 CBC Auto Diff  2017  White Blood Count  7.9 10^3/uL    3.5-10.8  









 Red Blood Count  4.36 10^6/uL    4.0-5.4  

 

 Hemoglobin  13.2 g/dL    12.0-16.0  

 

 Hematocrit  39 %    35-47  

 

 Mean Corpuscular Volume  89 fL    80-97  

 

 Mean Corpuscular Hemoglobin  30 pg    27-31  

 

 Mean Corpuscular HGB Conc  34 g/dL    31-36  

 

 Red Cell Distribution Width  14 %    10.5-15  

 

 Platelet Count  320 10^3/uL    150-450  

 

 Mean Platelet Volume  7 um3  Low  7.4-10.4  

 

 Abs Neutrophils  5.3 10^3/uL    1.5-7.7  

 

 Abs Lymphocytes  1.7 10^3/uL    1.0-4.8  

 

 Abs Monocytes  0.7 10^3/uL    0-0.8  

 

 Abs Eosinophils  0.1 10^3/uL    0-0.6  

 

 Abs Basophils  0.1 10^3/uL    0-0.2  

 

 Abs Nucleated RBC  0 10^3/uL      

 

 Granulocyte %  66.9 %    38-83  

 

 Lymphocyte %  22.2 %  Low  25-47  

 

 Monocyte %  9.0 %    1-9  

 

 Eosinophil %  1.1 %    0-6  

 

 Basophil %  0.8 %    0-2  

 

 Nucleated Red Blood Cells %  0      









 Laboratory test finding  2017  Lactic Acid  1.2 mmol/L    0.5-2.0  42

 

 Comp Metabolic Panel  2017  Sodium  135 mmol/L    133-145  









 Chloride  103 mmol/L    101-111  

 

 Co2 Carbon Dioxide  26 mmol/L    22-32  

 

 Glucose  96 mg/dL      

 

 Blood Urea Nitrogen  12 mg/dL    6-24  

 

 Creatinine  0.67 mg/dL    0.51-0.95  

 

 BUN/Creatinine Ratio  17.9    8-20  

 

 Calcium  9.3 mg/dL    8.6-10.3  

 

 Total Protein  7.4 g/dL    6.4-8.9  

 

 Albumin  3.9 g/dL    3.2-5.2  

 

 Globulin  3.5 g/dL    2-4  

 

 Albumin/Globulin Ratio  1.1    1-3  

 

 Total Bilirubin  0.50 mg/dL    0.2-1.0  

 

 Alkaline Phosphatase  56 U/L      

 

 Alt  20 U/L    7-52  

 

 Egfr Non-  94.3    &gt;60  

 

 Egfr   121.3    &gt;60  43

 

 Potassium  4.0 mmol/L    3.5-5.0  

 

 Anion Gap  6 mmol/L    2-11  

 

 Ast  23 U/L    13-39  









 Laboratory test finding  2017  Troponin I  0.01 ng/mL    &lt;0.04  44

 

 Laboratory test finding  10/20/2016  Hemoglobin A1c (Fma)  5.4 %    4.1-5.7  









 Microalb, Random (Fma/CMC/CTX)  21.1 mg/L    0.5-37  









 Ua - Non Micro (Fma)  10/20/2016  Appearance  yellow      









 Color  clear      

 

 Glucose, Urine (Fma/CMC/CTX)  neg      

 

 Bilirubin  neg      

 

 Ketones  trace      

 

 SP Grav  1.015      

 

 Blood  neg      

 

 PH  7.5      

 

 Protein  neg      

 

 Urobil  0.2      

 

 Nitrite  neg      

 

 Leukocytes (Fma/CMC/Centrex)  neg      









 Comprehensive Metabolic Prof  10/20/2016  Sodium  139 mEq/L    134-149  









 Potassium  4.1 mEq/L    3.6-5.5  

 

 Chloride  98 mEq/L      

 

 Carbon Dioxide  26 mEq/L    21-32  

 

 Glucose  90 mg/dL      

 

 BUN  13 mg/dL    6-26  

 

 Creatinine  0.7 mg/dL    0.6-1.4  

 

 BUN/Creat Ratio  18.6 CALC    8.0-36.0  

 

 Calcium  9.1 mg/dL    8.6-10.2  

 

 Total Protein  7.2 g/dL    6.4-8.3  

 

 Albumin  4.1 g/dL    3.8-5.5  

 

 Globulin  3.1 g/dL    2.0-4.8  

 

 A/G Ratio  1.3 CALC    0.6-2.3  

 

 Alk. Phosphatase  62 U/L      

 

 Alt (SGPT)  24 U/L    7-35  

 

 Ast (Sgot)  17 U/L    5-34  

 

 Total Bilirubin  0.3 mg/dL    0.2-1.3  

 

 GFR Non-African American  &gt;60 ml/min/1.73m^    &gt;=60  

 

 GFR   &gt;60 ml/min/1.73m^    &gt;=60  









 Lipid Profile  10/20/2016  Cholesterol  218 mg/dL  High  120-200  









 Triglycerides  46 mg/dL      

 

 HDL Cholesterol  70 mg/dL    30-85  

 

 LDL (Calculated)  139 CALC  High  0-129  

 

 VLDL Cholesterol  9 mg/dL    0-50  

 

 HDL Risk Factor  3.1 CALC    0.0-4.4  









 Complete Blood Count  10/20/2016  WBC  4.5 x10^3/UL    3.6-9.6  









 RBC  4.35 x10^6/UL    3.90-5.70  

 

 HGB  13.5 g/dL    12.1-17.2  

 

 HCT  40 %    36-50  

 

 MCV  92.0 fL    82.2-97.4  

 

 MCH  31.0 pg    27.6-33.3  

 

 MCHC  33.7 g/dL    33.0-35.5  

 

 RDW  13.9 %  High  11.6-13.7  

 

 PLT  350 x10^3/UL    150-400  

 

 MPV  6.0 fL  Low  7.4-10.4  

 

 Gran #  2.7 x10^3/UL    1.5-7.2  

 

 Lymph#  1.6 x10^3/UL    0.7-4.9  

 

 Mono#  0.2 x10^3/UL    0.1-0.9  

 

 Gran %  58.3 %    42.2-75.2  

 

 Lymph %  37.1 %    20.5-51.1  

 

 Mono%  4.6 %    1.7-9.3  









 Laboratory test finding  10/20/2016  TSH  2.15 mIU/L    0.50-6.00  45

 

 Laboratory test finding  10/08/2016  Troponin I  0.00 ng/mL    &lt;0.03  46









 HCG Pregnancy  &lt; 0.60 mIU/mL      47









 Comp Metabolic Panel  10/08/2016  Sodium  138 mmol/L    133-145  









 Potassium  3.1 mmol/L  Low  3.5-5.0  

 

 Chloride  105 mmol/L    101-111  

 

 Co2 Carbon Dioxide  24 mmol/L    22-32  

 

 Anion Gap  9 mmol/L    2-11  

 

 Glucose  123 mg/dL  High    

 

 Blood Urea Nitrogen  15 mg/dL    6-24  

 

 Creatinine  0.83 mg/dL    0.51-0.95  

 

 BUN/Creatinine Ratio  18.1    8-20  

 

 Calcium  9.5 mg/dL    8.6-10.3  

 

 Total Protein  7.2 g/dL    6.4-8.9  

 

 Albumin  3.8 g/dL    3.2-5.2  

 

 Globulin  3.4 g/dL    2-4  

 

 Albumin/Globulin Ratio  1.1    1-3  

 

 Total Bilirubin  0.40 mg/dL    0.2-1.0  

 

 Alkaline Phosphatase  56 U/L      

 

 Alt  16 U/L    7-52  

 

 Ast  17 U/L    13-39  

 

 Egfr Non-  73.7    &gt;60  

 

 Egfr   94.8    &gt;60  48









 Laboratory test finding  10/08/2016  Lactic Acid  1.6 mmol/L    0.5-2.0  49

 

 CBC Auto Diff  10/08/2016  White Blood Count  7.5 10^3/uL    3.5-10.8  









 Red Blood Count  4.29 10^6/uL    4.0-5.4  

 

 Hemoglobin  12.8 g/dL    12.0-16.0  

 

 Hematocrit  38 %    35-47  

 

 Mean Corpuscular Volume  89 fL    80-97  

 

 Mean Corpuscular Hemoglobin  30 pg    27-31  

 

 Mean Corpuscular HGB Conc  34 g/dL    31-36  

 

 Red Cell Distribution Width  13 %    10.5-15  

 

 Platelet Count  354 10^3/uL    150-450  

 

 Mean Platelet Volume  7 um3  Low  7.4-10.4  

 

 Abs Neutrophils  5.3 10^3/uL    1.5-7.7  

 

 Abs Lymphocytes  1.4 10^3/uL    1.0-4.8  

 

 Abs Monocytes  0.7 10^3/uL    0-0.8  

 

 Abs Eosinophils  0.1 10^3/uL    0-0.6  

 

 Abs Basophils  0 10^3/uL    0-0.2  

 

 Abs Nucleated RBC  0 10^3/uL      

 

 Granulocyte %  70.5 %    38-83  

 

 Lymphocyte %  18.4 %  Low  25-47  

 

 Monocyte %  9.8 %  High  1-9  

 

 Eosinophil %  0.8 %    0-6  

 

 Basophil %  0.5 %    0-2  

 

 Nucleated Red Blood Cells %  0      









 Laboratory test finding  2016  HIV 1&amp;2 AB Self  Nonreactive    
Nonreactive  50



     Referred        

 

 Laboratory test finding  2016  Troponin I  0.00 ng/mL    &lt;0.03  51

 

 Comp Metabolic Panel  2016  Sodium  135 mmol/L    133-145  









 Chloride  102 mmol/L    101-111  

 

 Co2 Carbon Dioxide  25 mmol/L    22-32  

 

 Glucose  139 mg/dL  High    

 

 Blood Urea Nitrogen  14 mg/dL    6-24  

 

 Creatinine  0.64 mg/dL    0.51-0.95  

 

 BUN/Creatinine Ratio  21.9  High  8-20  

 

 Calcium  9.1 mg/dL    8.6-10.3  

 

 Total Protein  7.0 g/dL    6.4-8.9  

 

 Albumin  3.6 g/dL    3.2-5.2  

 

 Globulin  3.4 g/dL    2-4  

 

 Albumin/Globulin Ratio  1.1    1-3  

 

 Total Bilirubin  0.30 mg/dL    0.2-1.0  

 

 Alkaline Phosphatase  54 U/L      

 

 Alt  17 U/L    7-52  

 

 Egfr Non-  99.5    &gt;60  

 

 Egfr   127.9    &gt;60  52

 

 Potassium  3.9 mmol/L    3.5-5.0  

 

 Anion Gap  8 mmol/L    2-11  

 

 Ast  16 U/L    13-39  









 CBC Auto Diff  2016  White Blood Count  8.2 10^3/uL    3.5-10.8  









 Red Blood Count  4.37 10^6/uL    4.0-5.4  

 

 Hemoglobin  13.3 g/dL    12.0-16.0  

 

 Hematocrit  40 %    35-47  

 

 Mean Corpuscular Volume  91 fL    80-97  

 

 Mean Corpuscular Hemoglobin  30 pg    27-31  

 

 Mean Corpuscular HGB Conc  34 g/dL    31-36  

 

 Red Cell Distribution Width  13 %    10.5-15  

 

 Platelet Count  293 10^3/uL    150-450  

 

 Mean Platelet Volume  7 um3  Low  7.4-10.4  

 

 Abs Neutrophils  5.0 10^3/uL    1.5-7.7  

 

 Abs Lymphocytes  2.5 10^3/uL    1.0-4.8  

 

 Abs Monocytes  0.6 10^3/uL    0-0.8  

 

 Abs Eosinophils  0.1 10^3/uL    0-0.6  

 

 Abs Basophils  0.1 10^3/uL    0-0.2  

 

 Abs Nucleated RBC  0 10^3/uL      

 

 Granulocyte %  60.5 %    38-83  

 

 Lymphocyte %  29.8 %    25-47  

 

 Monocyte %  7.8 %    1-9  

 

 Eosinophil %  1.2 %    0-6  

 

 Basophil %  0.7 %    0-2  

 

 Nucleated Red Blood Cells %  0      









 1  2 sst 1 lav

 

 2  Pre-diabetes: 5.7 - 6.4



   Diabetes: &gt;6.4



   Glycemic control for adults with diabetes: &lt;7.0

 

 3  SEE RESULT BELOW



   -----------------------------------------------------------------------------
---------------



   Name:  MARIELOS DUMONT               : 1970    Attend Dr: Felipe Hendricks MD



   Acct:  [id number]  Unit: [id number]  AGE: 47            Location:  [place] [address]



   Reg:   10/19/17      Dis:  10/20/17    SEX: F             Status:    DIS Joshua



   -----------------------------------------------------------------------------
---------------



   



   SPEC: [id number]         JACOB:   10/19/    [facility] DR: Austin Romano MD



   REQ:  [id number]              RECD:   10/19/



   STATUS: ONEIL MICHAEL DR: Lin Lima MD



   _



   SOURCE: BLOOD,VENO     SPDESC:



   ORDERED:  Blood Cult



   



   -----------------------------------------------------------------------------
---------------



   Procedure                         Result                         Reported   
        Site



   -----------------------------------------------------------------------------
---------------



   Aerobic Culture Bottle  Final                                    10/24/17-
4      ML



   No Growth Day 5



   



   Anaerobic Culture Bottle  Final                                  10/24/17-
1714      ML



   No Growth Day 5



   



   -----------------------------------------------------------------------------
---------------



   * ML - MAIN LAB ([facility])



   .



   



   



   



   



   



   



   



   



   



   



   



   



   



   



   



   



   



   



   



   



   



   



   



   



   ** END OF REPORT **



   



   * ML=Testing performed at Main Lab



   DEPARTMENT OF PATHOLOGY,  [address] [place] [address]



   Phone # [phone]      Fax #[phone]



   David Naik M.D. Director     [facility] # [id number]

 

 4  &gt;100 to &lt;200 pg/mL: likely compensated congestive heart



   failure (CHF)



   200 to 400 pg/mL: likely moderate CHF



   &gt;400 pg/mL: likely moderate to severe CHF

 

 5  [facility] Severe Sepsis and Septic Shock Management Bundle Measure



   requires all lactic acids initially measuring &gt;2.0 mmol/L be



   repeated.

 

 6  *******Because ethnic data is not always readily available,



   this report includes an eGFR for both -Americans and



   non- Americans.****



   The National Kidney Disease Education Program (NKDEP) does



   not endorse the use of the MDRD equation for patients that



   are not between the ages of 18 and 70, are pregnant, have



   extremes of body size, muscle mass, or nutritional status,



   or are non- or non-.



   According to the National Kidney Foundation, irrespective of



   diagnosis, the stage of the disease is based on the level of



   kidney function:



   Stage Description                      GFR(mL/min/1.73 m(2))



   1     Kidney damage with normal or decreased GFR       90



   2     Kidney damage with mild decrease in GFR          60-89



   3     Moderate decrease in GFR                         30-59



   4     Severe decrease in GFR                           15-29



   5     Kidney failure                       &lt;15 (or dialysis)

 

 7  *******Because ethnic data is not always readily available,



   this report includes an eGFR for both -Americans and



   non- Americans.****



   The National Kidney Disease Education Program (NKDEP) does



   not endorse the use of the MDRD equation for patients that



   are not between the ages of 18 and 70, are pregnant, have



   extremes of body size, muscle mass, or nutritional status,



   or are non- or non-.



   According to the National Kidney Foundation, irrespective of



   diagnosis, the stage of the disease is based on the level of



   kidney function:



   Stage Description                      GFR(mL/min/1.73 m(2))



   1     Kidney damage with normal or decreased GFR       90



   2     Kidney damage with mild decrease in GFR          60-89



   3     Moderate decrease in GFR                         30-59



   4     Severe decrease in GFR                           15-29



   5     Kidney failure                       &lt;15 (or dialysis)

 

 8  Desirable &lt;150



   Borderline high 150-199



   High 200-499



   Very High &gt;500

 

 9  Desirable &lt;200



   Borderline high 200-239



   High &gt;239

 

 10  Low &lt;40



   Desirable: 40-60



   High: &gt;60

 

 11  Desirable: &lt;100 mg/dL



   Near Optimal: 100-129 mg/dL



   Borderline High: 130-159 mg/dL



   High: 160-189 mg/dL



   Very High: &gt;189 mg/dL

 

 12  Therapeutic target for the treatment of diabetes



   Mellitus patients is &lt;7% HBA1C, and in selective



   patients &lt;6.0%.Please refer to American Diabetes



   Association Diabetic care guidelines for further



   information.

 

 13  SEE RESULT BELOW



   -----------------------------------------------------------------------------
---------------



   Name:  MARIELOS DUMONT REBA               : 1970    Attend Dr: Lin Lima MD



   Acct:  [id number]  Unit: [id number]  AGE: 47            Location:  [facility]



   Reg:   10/03/17                        SEX: F             Status:    REG REF



   -----------------------------------------------------------------------------
---------------



   



   SPEC: [id number]         JACOB:   10/03/    SU[facility] DR: Lin Lima MD



   REQ:  [id number]              RECD:   10/03/



   STATUS: COMP



   _



   SOURCE: URINE          SPDESC:



   ORDERED:  Urine Culture



   



   -----------------------------------------------------------------------------
---------------



   Procedure                         Result                         Reported   
        Site



   -----------------------------------------------------------------------------
---------------



   Urine Culture  Final                                             10/04/17-
1313      ML



   No Growth (&lt;1,000 CFU/mL)



   



   -----------------------------------------------------------------------------
---------------



   * ML - MAIN LAB ([facility])



   .



   



   



   



   



   



   



   



   



   



   



   



   



   



   



   



   



   



   



   



   



   



   



   



   



   



   



   ** END OF REPORT **



   



   * ML=Testing performed at Main Lab



   DEPARTMENT OF PATHOLOGY,  [address] [place] [address]



   Phone # [phone]      Fax #[phone]



   David Naik M.D. Director     [facility] # [id number]

 

 14  SRC:&lt;Blank&gt; 1urine vacutai ner

 

 15  Source of Specimen: &lt;Blank&gt; 1urine vacutai

 

 16  Escherichia coli



   Source of Specimen: &lt;Blank&gt; 1urine vacutai



   25,000-50,000 colony forming units per mL

 

 17  Source of Specimen: &lt;Blank&gt; 1urine vacutai



   ** S=Susceptible; I=Intermediate; R=Resistant **



   P=Positive; N=Negative



   MICS are expressed in micrograms per mL



   Antibiotic                 RSLT#1    RSLT#2    RSLT#3    RSLT#4



   Amoxicillin/Clavulanic Acid    S



   Ampicillin                     S



   Cefepime                       S



   Ceftriaxone                    S



   Cefuroxime                     S



   Cephalothin                    I



   Ciprofloxacin                  S



   Ertapenem                      S



   Gentamicin                     S



   Imipenem                       S



   Levofloxacin                   S



   Nitrofurantoin                 S



   Piperacillin                   S



   Tetracycline                   S



   Tobramycin                     S



   Trimethoprim/Sulfa             R

 

 18  Therapeutic concentration: &lt;50 ug/mL



   Toxic concentration: &gt;120 ug/mL

 

 19  [facility] Severe Sepsis and Septic Shock Management Bundle Measure



   requires all lactic acids initially measuring &gt;2.0 mmol/L be



   repeated.

 

 20  *******Because ethnic data is not always readily available,



   this report includes an eGFR for both -Americans and



   non- Americans.****



   The National Kidney Disease Education Program (NKDEP) does



   not endorse the use of the MDRD equation for patients that



   are not between the ages of 18 and 70, are pregnant, have



   extremes of body size, muscle mass, or nutritional status,



   or are non- or non-.



   According to the National Kidney Foundation, irrespective of



   diagnosis, the stage of the disease is based on the level of



   kidney function:



   Stage Description                      GFR(mL/min/1.73 m(2))



   1     Kidney damage with normal or decreased GFR       90



   2     Kidney damage with mild decrease in GFR          60-89



   3     Moderate decrease in GFR                         30-59



   4     Severe decrease in GFR                           15-29



   5     Kidney failure                       &lt;15 (or dialysis)

 

 21  Therapeutic concentration: &lt;50 ug/mL



   Toxic concentration: &gt;120 ug/mL

 

 22  *******Because ethnic data is not always readily available,



   this report includes an eGFR for both -Americans and



   non- Americans.****



   The National Kidney Disease Education Program (NKDEP) does



   not endorse the use of the MDRD equation for patients that



   are not between the ages of 18 and 70, are pregnant, have



   extremes of body size, muscle mass, or nutritional status,



   or are non- or non-.



   According to the National Kidney Foundation, irrespective of



   diagnosis, the stage of the disease is based on the level of



   kidney function:



   Stage Description                      GFR(mL/min/1.73 m(2))



   1     Kidney damage with normal or decreased GFR       90



   2     Kidney damage with mild decrease in GFR          60-89



   3     Moderate decrease in GFR                         30-59



   4     Severe decrease in GFR                           15-29



   5     Kidney failure                       &lt;15 (or dialysis)

 

 23  Please check this week

 

 24  Please check this week

 

 25  Please check this week

 

 26  Please check this week

 

 27  Please check this week

 

 28  Normal Range 180 to 914



   Indeterminate Range 145 to 180



   Deficient Range  &lt;145

 

 29  Please check this week

 

 30  Test Performed by:



   [address] [address]

 

 31  -------------------ADDITIONAL INFORMATION-------------------



   The thyroglobulin antibody testing method is an



   immunoenzymatic assay manufactured by [employer]



   and performed on the [employer] .



   Values obtained from different assay methods or kits



   may be different and cannot be used interchangeably.



   The results cannot be interpreted as absolute evidence



   for the presence or absence of malignant disease.



   Test Performed by:



   [address] [address]

 

 32  Negative for cANCA and pANCA patterns by immunofluorescence.



   -------------------ADDITIONAL INFORMATION-------------------



   This test was developed and its performance characteristics



   determined by [facility] in a manner consistent with CLIA



   requirements. This test has not been cleared or approved by



   the U.S. Food and Drug Administration.



   Test Performed by:



   [facility] - [address] [address]

 

 33  -------------------REFERENCE VALUE--------------------------



   &lt;=1.0 (Negative)

 

 34  -------------------REFERENCE VALUE--------------------------



   &lt;20.0 (Negative)

 

 35  Tests for antibodies to dsDNA and KEY antigens are not



   performed automatically unless the MIGUEL result is &gt; or=



   3.0 U.  Studies performed at [facility] indicate that



   positive MIGUEL results &lt;3.0 U are rarely accompanied by



   positive second order tests.



   Test Performed by:



   [facility] - [address] [address]

 

 36  Test Performed by:



   [facility] - [address] [address]

 

 37  -------------------REFERENCE VALUE--------------------------



   Not Applicable

 

 38  RESULT: HLA-B27 antigen was not detected.



   -------------------ADDITIONAL INFORMATION-------------------



   Method: Flow Cytometry



   Performing Laboratory CLIA# [id number]



   Test Performed by:



   [facility] - [address] [address]

 

 39  -------------------ADDITIONAL INFORMATION-------------------



   This test was developed and its performance characteristics



   determined by [facility] in a manner consistent with CLIA



   requirements. This test has not been cleared or approved by



   the U.S. Food and Drug Administration.



   Test Performed by:



   [address] [address]

 

 40  *******Because ethnic data is not always readily available,



   this report includes an eGFR for both -Americans and



   non- Americans.****



   The National Kidney Disease Education Program (NKDEP) does



   not endorse the use of the MDRD equation for patients that



   are not between the ages of 18 and 70, are pregnant, have



   extremes of body size, muscle mass, or nutritional status,



   or are non- or non-.



   According to the National Kidney Foundation, irrespective of



   diagnosis, the stage of the disease is based on the level of



   kidney function:



   Stage Description                      GFR(mL/min/1.73 m(2))



   1     Kidney damage with normal or decreased GFR       90



   2     Kidney damage with mild decrease in GFR          60-89



   3     Moderate decrease in GFR                         30-59



   4     Severe decrease in GFR                           15-29



   5     Kidney failure                       &lt;15 (or dialysis)

 

 41  99th percentile=0.04 ng/mL



   



   Troponin results at [facility] and [facility] are not interchangeable.

 

 42  [facility] Severe Sepsis and Septic Shock Management Bundle Measure



   requires all lactic acids initially measuring &gt;2.0 mmol/L be



   repeated.

 

 43  *******Because ethnic data is not always readily available,



   this report includes an eGFR for both -Americans and



   non- Americans.****



   The National Kidney Disease Education Program (NKDEP) does



   not endorse the use of the MDRD equation for patients that



   are not between the ages of 18 and 70, are pregnant, have



   extremes of body size, muscle mass, or nutritional status,



   or are non- or non-.



   According to the National Kidney Foundation, irrespective of



   diagnosis, the stage of the disease is based on the level of



   kidney function:



   Stage Description                      GFR(mL/min/1.73 m(2))



   1     Kidney damage with normal or decreased GFR       90



   2     Kidney damage with mild decrease in GFR          60-89



   3     Moderate decrease in GFR                         30-59



   4     Severe decrease in GFR                           15-29



   5     Kidney failure                       &lt;15 (or dialysis)

 

 44  99th percentile=0.04 ng/mL



   



   Troponin results at [facility] and [facility] are not interchangeable.

 

 45  FASTING

 

 46  Reference Range and Interpretation:



   TnI (ng/mL)             Interpretation



   Less Than 0.03 ng/mL    Not supportive of diagnosis of MI



   0.03 - 0.50 ng/mL       Indeterminate: suggest serial



   studies if clinically indicated.



   Greater than 0.5 ng/mL  Consistent with diagnosis of MI

 

 47  &lt;5.0 Negative



   



   5.0 - 25.0  Indeterminate (Repeat testing recommended after



   72 hours)



   &gt;25.0  Positive



   



   Perimenopausal women can display HCG levels of up to 20



   mIU/mL

 

 48  *******Because ethnic data is not always readily available,



   this report includes an eGFR for both -Americans and



   non- Americans.****



   The National Kidney Disease Education Program (NKDEP) does



   not endorse the use of the MDRD equation for patients that



   are not between the ages of 18 and 70, are pregnant, have



   extremes of body size, muscle mass, or nutritional status,



   or are non- or non-.



   According to the National Kidney Foundation, irrespective of



   diagnosis, the stage of the disease is based on the level of



   kidney function:



   Stage Description                      GFR(mL/min/1.73 m(2))



   1     Kidney damage with normal or decreased GFR       90



   2     Kidney damage with mild decrease in GFR          60-89



   3     Moderate decrease in GFR                         30-59



   4     Severe decrease in GFR                           15-29



   5     Kidney failure                       &lt;15 (or dialysis)

 

 49  [facility] Severe Sepsis and Septic Shock Management Bundle Measure



   requires all lactic acids initially measuring &gt;2.0 mmol/L be



   repeated.

 

 50  It is recognized that currently available assays for the



   detection of antibodies to HIV-1 and/or HIV-2 may not detect



   all infected individuals. HIV antibodies may be undetectable



   in some stages of the infection and in some clinical



   conditions. The performance of this assay has not been



   established for populations of infants or children.



   



   Assayed by Chemiluminescence Microparticle Immunoassay on



   the Siemens Advia Centaur CP. Values obtained with different



   methods or kits cannot be used interchangeably.The



   diagnostic specificity of the ADVIA Centaur 1/O/2 Enhanced



   assay in the low risk population was 99.90% (6052/6058) with



   a 95% confidence interval of 99.78 to 99.96%.

 

 51  Reference Range and Interpretation:



   TnI (ng/mL)             Interpretation



   Less Than 0.03 ng/mL    Not supportive of diagnosis of MI



   0.03 - 0.50 ng/mL       Indeterminate: suggest serial



   studies if clinically indicated.



   Greater than 0.5 ng/mL  Consistent with diagnosis of MI

 

 52  *******Because ethnic data is not always readily available,



   this report includes an eGFR for both -Americans and



   non- Americans.****



   The National Kidney Disease Education Program (NKDEP) does



   not endorse the use of the MDRD equation for patients that



   are not between the ages of 18 and 70, are pregnant, have



   extremes of body size, muscle mass, or nutritional status,



   or are non- or non-.



   According to the National Kidney Foundation, irrespective of



   diagnosis, the stage of the disease is based on the level of



   kidney function:



   Stage Description                      GFR(mL/min/1.73 m(2))



   1     Kidney damage with normal or decreased GFR       90



   2     Kidney damage with mild decrease in GFR          60-89



   3     Moderate decrease in GFR                         30-59



   4     Severe decrease in GFR                           15-29



   5     Kidney failure                       &lt;15 (or dialysis)







Procedures







 Date  CPT Code  Description  Status

 

 2018    Mammogram  Completed

 

 2017  44552  Finger Or Heel Stick  Completed

 

 2016    Mammogram  Completed







Encounters







 Type  Date  Location  Provider  CPT E/M  Dx

 

 Office Visit  2018 10:30a  [facility] Office  Lin Lima M.D.  [address]  A09









 E11.9

 

 F33.1









 Office Visit  2018 10:30a  Main Office  Vladimir Diego  [address]  
J06.9









 J45.901









 Office Visit  2018 10:30a  [facility]  Lin Lima M.D.  [address]68  
M79.671









 M54.5

 

 M79.7









 Office Visit  2017  3:00p  [facility]  Lin Lima M.D.  [address]  
Z00.00









 E11.9

 

 F33.1

 

 I10

 

 E78.5

 

 Z12.31

 

 M54.5









 Office Visit  2017  6:00p  [facility] Office  Lindsay Lee NP  [address]  
R11.2









 R19.7

 

 E11.9









 Office Visit  10/23/2017  7:20p  [facility]  Lin Lima M.D.  [address]27  F33.1









 E11.9

 

 I10

 

 E78.5

 

 R07.9









 Office Visit  10/02/2017  2:00p  [facility] Office  Lin Lima M.D.  [address]181  F33.1









 M54.5

 

 E11.9

 

 E04.2

 

 M25.549









 Office Visit  2017 11:00a  [facility] Office  Óscar Riojas M.D.  [address]387  
N39.0

 

 Office Visit  2017  7:40p  [facility]  Lin Lima M.D.  [address]676  F33.1









 T14.91









 Office Visit  2017  4:40p  [facility]  Lin Lima M.D.  [address]  
M54.5









 E11.9

 

 F33.1

 

 E04.2









 Office Visit  2017  8:00a  [facility]  Lin Lima M.D.  [address]272  R42









 J06.9

 

 R11.10









 Office Visit  2017 10:50a  [facility]  Lin Lima M.D.  [address]  
L94.9









 I10

 

 M25.549









 Office Visit  2016 10:30a  [facility]  Lin Lima M.D.  [address]  
I10









 F33.1

 

 E11.9

 

 M54.5









 Office Visit  10/17/2016  3:00p  [facility]  ROSALINDA Alejandro  [address]  J06.9

 

 Office Visit  2016  2:10p  [facility]  Lin Lima M.D.  [address]  
E11.9









 F33.1

 

 M54.5

 

 I10









 Office Visit  2016  3:45p  [facility]  ROSALINDA Alejandro  [address]  
S62.606A









 W10.8xxA









 Office Visit  2016 10:00a  [facility]  ROSALINDA Bryan  
[address]  E11.9









 F32.9

 

 M54.5







Plan of Care

2018 - Lin Lima M.D.A09 Infectious gastroenteritis and colitis, 
unspecifiedComments:given weight loss, failed oral antiemtics, concern for 
dehydration, possible cdiff with recent antibiotics  advised ER evaluation, 
patient calling son to take herE11.9 Type 2 diabetes mellitus without 
complicationsNew Labs:Hemoglobin A1c (Fma)Comments:Recommend yearly diabetic 
eye and foot exams, and check on blood pressure periodically. Goal blood sugar 
is less than 140 in the morning or A1c less than 7.F33.1 Major depressive 
disorder, recurrent, moderateAllComments:~B_~U_Medication Management~b_~u_ 
Patient Understands medications she's taking?     Yes    No  Are there Barriers 
to Adherence?    Yes    No  Has the patient been asked about herbal supplements 
and therapies, and OTC meds?      Yes    No

## 2018-03-02 ENCOUNTER — HOSPITAL ENCOUNTER (EMERGENCY)
Dept: HOSPITAL 25 - ED | Age: 48
Discharge: HOME | End: 2018-03-02
Payer: COMMERCIAL

## 2018-03-02 VITALS — SYSTOLIC BLOOD PRESSURE: 112 MMHG | DIASTOLIC BLOOD PRESSURE: 86 MMHG

## 2018-03-02 DIAGNOSIS — E87.6: ICD-10-CM

## 2018-03-02 DIAGNOSIS — E11.9: ICD-10-CM

## 2018-03-02 DIAGNOSIS — M79.7: ICD-10-CM

## 2018-03-02 DIAGNOSIS — E05.90: ICD-10-CM

## 2018-03-02 DIAGNOSIS — R42: ICD-10-CM

## 2018-03-02 DIAGNOSIS — I10: Primary | ICD-10-CM

## 2018-03-02 DIAGNOSIS — R19.7: ICD-10-CM

## 2018-03-02 LAB
BASOPHILS # BLD AUTO: 0.1 10^3/UL (ref 0–0.2)
EOSINOPHIL # BLD AUTO: 0 10^3/UL (ref 0–0.6)
HCT VFR BLD AUTO: 42 % (ref 35–47)
HGB BLD-MCNC: 14.2 G/DL (ref 12–16)
INR PPP/BLD: 1.01 (ref 0.77–1.02)
LYMPHOCYTES # BLD AUTO: 2.1 10^3/UL (ref 1–4.8)
MCH RBC QN AUTO: 30 PG (ref 27–31)
MCHC RBC AUTO-ENTMCNC: 34 G/DL (ref 31–36)
MCV RBC AUTO: 88 FL (ref 80–97)
MONOCYTES # BLD AUTO: 0.8 10^3/UL (ref 0–0.8)
NEUTROPHILS # BLD AUTO: 4.7 10^3/UL (ref 1.5–7.7)
NRBC # BLD AUTO: 0 10^3/UL
NRBC BLD QL AUTO: 0
PLATELET # BLD AUTO: 368 10^3/UL (ref 150–450)
RBC # BLD AUTO: 4.75 10^6/UL (ref 4–5.4)
WBC # BLD AUTO: 7.7 10^3/UL (ref 3.5–10.8)

## 2018-03-02 PROCEDURE — 87046 STOOL CULTR AEROBIC BACT EA: CPT

## 2018-03-02 PROCEDURE — 87329 GIARDIA AG IA: CPT

## 2018-03-02 PROCEDURE — 80053 COMPREHEN METABOLIC PANEL: CPT

## 2018-03-02 PROCEDURE — 87040 BLOOD CULTURE FOR BACTERIA: CPT

## 2018-03-02 PROCEDURE — 83605 ASSAY OF LACTIC ACID: CPT

## 2018-03-02 PROCEDURE — 82550 ASSAY OF CK (CPK): CPT

## 2018-03-02 PROCEDURE — 83735 ASSAY OF MAGNESIUM: CPT

## 2018-03-02 PROCEDURE — 84484 ASSAY OF TROPONIN QUANT: CPT

## 2018-03-02 PROCEDURE — 87045 FECES CULTURE AEROBIC BACT: CPT

## 2018-03-02 PROCEDURE — 82272 OCCULT BLD FECES 1-3 TESTS: CPT

## 2018-03-02 PROCEDURE — 93005 ELECTROCARDIOGRAM TRACING: CPT

## 2018-03-02 PROCEDURE — 96360 HYDRATION IV INFUSION INIT: CPT

## 2018-03-02 PROCEDURE — 87150 DNA/RNA AMPLIFIED PROBE: CPT

## 2018-03-02 PROCEDURE — 87328 CRYPTOSPORIDIUM AG IA: CPT

## 2018-03-02 PROCEDURE — 36415 COLL VENOUS BLD VENIPUNCTURE: CPT

## 2018-03-02 PROCEDURE — 84702 CHORIONIC GONADOTROPIN TEST: CPT

## 2018-03-02 PROCEDURE — 87449 NOS EACH ORGANISM AG IA: CPT

## 2018-03-02 PROCEDURE — 99283 EMERGENCY DEPT VISIT LOW MDM: CPT

## 2018-03-02 PROCEDURE — 83630 LACTOFERRIN FECAL (QUAL): CPT

## 2018-03-02 PROCEDURE — 87205 SMEAR GRAM STAIN: CPT

## 2018-03-02 PROCEDURE — 85610 PROTHROMBIN TIME: CPT

## 2018-03-02 PROCEDURE — 85730 THROMBOPLASTIN TIME PARTIAL: CPT

## 2018-03-02 PROCEDURE — 87077 CULTURE AEROBIC IDENTIFY: CPT

## 2018-03-02 PROCEDURE — 81003 URINALYSIS AUTO W/O SCOPE: CPT

## 2018-03-02 PROCEDURE — 87899 AGENT NOS ASSAY W/OPTIC: CPT

## 2018-03-02 PROCEDURE — 82150 ASSAY OF AMYLASE: CPT

## 2018-03-02 PROCEDURE — 83690 ASSAY OF LIPASE: CPT

## 2018-03-02 PROCEDURE — 86140 C-REACTIVE PROTEIN: CPT

## 2018-03-02 PROCEDURE — 87493 C DIFF AMPLIFIED PROBE: CPT

## 2018-03-02 PROCEDURE — 85025 COMPLETE CBC W/AUTO DIFF WBC: CPT

## 2018-03-02 NOTE — ED
Jhonny MOSER Stephanie, scribed for Cuca Blancas MD on 03/01/18 at 1440 .





GI/ HPI





- HPI Summary


HPI Summary: 


The pt is a 46 y/o F presenting to the ED with c/o N/V/D that began on 2/24/18. 

The pt states that diarrhea began on 2/24/18 and vomiting began on 2/26/18. 

Symptoms include nausea and lack of appetite. The pt reports she was on a z-

pack for lung congestion. Pt is sent in by Dr. Lima with concern for possible 

C diff. 








- History of Current Complaint


Chief Complaint: EDFluSymptoms


Time Seen by Provider: 03/01/18 13:49


Stated Complaint: FEVER,DIZZINESS,CAN'T EAT


Hx Obtained From: Patient


Onset/Duration: Started Days Ago - 5, Still Present


Timing: Constant


Severity: Severe


Current Severity: Severe


Pain Intensity: 8


Location of Pain: Diffuse


Pain Characteristics: Cramping


Associated Signs and Symptoms: Positive: Nausea, Vomiting, Diarrhea, Change in 

Appetite, Abdominal Pain


Aggravating Factor(s): Nothing


Alleviating Factor(s): Nothing





- Additional Pertinent History


Primary Care Physician: [id number]





- Allergy/Home Medications


Allergies/Adverse Reactions: 


 Allergies











Allergy/AdvReac Type Severity Reaction Status Date / Time


 


ibuprofen Allergy Intermediate Abdominal Verified 03/02/18 12:57





   Pain  











Home Medications: 


 Home Medications





ARIPiprazole TAB* [Abilify 2 MG TAB*] 2 mg PO DAILY 03/01/18 [History Confirmed 

03/01/18]


David/D3/Mag11/Zinc//Garret/Bor [Caltrate 600+D Plus] 1 tab PO DAILY 03/01/18 [

History Confirmed 03/01/18]


Gabapentin CAP(*) [Neurontin 300 CAP(*)] 300 mg PO TID 03/01/18 [History 

Confirmed 03/01/18]


Methocarbamol TAB* [Robaxin 500 MG TAB*] 750 mg PO BID PRN 03/01/18 [History 

Confirmed 03/01/18]


Omega-3 Fatty Acids (Nf) [Fish Oil (NF)] 1,000 mg PO DAILY 03/01/18 [History 

Confirmed 03/01/18]


Ubidecarenone [Coq10] 100 mg PO DAILY 03/01/18 [History Confirmed 03/01/18]


diPHENhydraMINE PO* [Benadryl PO 25 MG TAB*] 25 mg PO Q6H PRN 03/01/18 [History 

Confirmed 03/01/18]











PMH/Surg Hx/FS Hx/Imm Hx


Previously Healthy: No


Endocrine/Hematology History: Reports: Hx Diabetes, Hx Thyroid Disease - hyper


   Denies: Hx Anemia


Cardiovascular History: Reports: Hx Hypertension


   Denies: Hx Congestive Heart Failure, Hx Pacemaker/ICD


Respiratory History: Reports: Hx Asthma


   Denies: Hx Chronic Obstructive Pulmonary Disease (COPD)


GI History: Reports: Other GI Disorders - patient states chronic nausea


   Denies: Hx Jaundice


 History: 


   Denies: Hx Dialysis, Hx Renal Disease


Musculoskeletal History: Reports: Hx Arthritis, Hx Back Problems, Hx 

Fibromyalgia


   Comment Only: Other Musculoskeletal History - fibromyalgia


Sensory History: Reports: Hx Contacts or Glasses


   Denies: Hx Hearing Aid


Opthamlomology History: Reports: Hx Contacts or Glasses


Neurological History: Reports: Hx Migraine, Other Neuro Impairments/Disorders - 

vertigo


Psychiatric History: Reports: Hx Anxiety, Hx Depression, Hx Panic Disorder, Hx 

Suicide Attempt


   Denies: Hx Eating Disorder





- Cancer History


Hx Chemotherapy: No


Hx Radiation Therapy: No





- Surgical History


Surgery Procedure, Year, and Place: PARTIAL HYSTERECTOMY 3/2000, TUBAL 1997, 

GALLBLADDER 1995





- Immunization History


Date of Tetanus Vaccine: Unk


Date of Influenza Vaccine: None Fall 2014


Infectious Disease History: No


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 

Tuberculosis - pt reports ppd positive but chest xray negative.


   Denies: Traveled Outside the [place] in Last 30 Days





- Family History


Known Family History: Positive: Other - negative FHx of breast CA


   Negative: Cardiac Disease, Hypertension, Diabetes





- Social History


Occupation: Unemployed


Lives: With Family


Alcohol Use: None


Hx Substance Use: No


Substance Use Type: Reports: None


Substance Use Comment - Amount & Last Used: Unknown


Hx Tobacco Use: No


Smoking Status (MU): Never Smoked Tobacco


Have You Smoked in the Last Year: No





Review of Systems


Positive: Other - decreased appetite.  Negative: Fever


Cardiovascular: Negative


Respiratory: Negative


Positive: Abdominal Pain, Vomiting, Diarrhea, Nausea


Skin: Negative


Neurological: Other - dizzy


Positive: Anxious


All Other Systems Reviewed And Are Negative: Yes





Physical Exam





- Summary


Physical Exam Summary: 


Appearance: Ill-appearing, moderate pain distress, Well-nourished


Skin: Warm, color reflects adequate perfusion


Head: Normal Head/Face inspection


Eyes: Conjunctiva clear


ENT: Normal inspection


Neck: Supple, no nodes, no JVD.


Respiratory: Lungs clear, Normal breath sounds, no respiratory distress


Cardio: RRR, No murmur, pulses normal, brisk capillary refill


Abdomen: diffuse abdominal tenderness, soft, no masses, no guarding, no rebound 


Bowel sounds: present 


Musculoskeletal: Strength Intact/ ROM intact. No calf tenderness. No edema.


Neuro: Alert, muscle tone normal, facial symmetry, speech normal, sensory/motor 

intact 


Psychological: Normal








Triage Information Reviewed: Yes


Vital Signs On Initial Exam: 


 Initial Vitals











Temp Pulse Resp BP Pulse Ox


 


 98.3 F   112   24   166/107   99 


 


 03/01/18 12:11  03/01/18 12:11  03/01/18 12:11  03/01/18 12:11  03/01/18 12:11











Vital Signs Reviewed: Yes





Diagnostics





- Vital Signs


 Vital Signs











  Temp Pulse Resp BP Pulse Ox


 


 03/01/18 12:11  98.3 F  112  24  166/107  99














- Laboratory


Lab Results: 


 Lab Results











  03/01/18 Range/Units





  13:08 


 


Influenza A (Rapid)  Negative  (Negative)  


 


Influenza B (Rapid)  Negative  (Negative)  











Result Diagrams: 


 03/01/18 14:35





 03/01/18 14:35


Lab Statement: Any lab studies that have been ordered have been reviewed, and 

results considered in the medical decision making process.





- EKG


  ** 12:15


Cardiac Rate: NL


EKG Rhythm: Sinus Rhythm - 79 BPM


ST Segment: Non-Specific


Ectopy: None


EKG Interpretation: nml AVIVCT, nml QTc, and nml axis.


EKG Comparison: No Significant Change - since 10/20/17





Re-Evaluation





- Re-Evaluation


  ** First Eval


Re-Evaluation Time: 15:59


Change: Unchanged - ED physician discussed plan of discharge with the pt. The 

pt agrees to discharge plan. The pt is rocking back and forth in [place]. 

She states she needs to return home to care for her disabled son. She states 

she will return a stool sample to the hospital as soon as possible.





GIGU Course/Dx





- Course


Course Of Treatment: Pt was unable to provide stool sample. Given supplies to 

return sample at a later time.  Pt was given IV fluids and KCl po 40 mEq x 1.  

She was given valium 5mg po, her usual anxiety medication, prior to discharge 

at her request.





- Diagnoses


Differential Diagnoses - Female: Colitis, Dehydration, Diverticulitis, Diarrhea

, Gastroenteritis (Viral), Gastroenteritis (Bacterial)


Provider Diagnoses: 


 Diarrhea, Hypokalemia








Discharge





- Discharge Plan


Condition: Stable


Disposition: HOME


Patient Education Materials:  Hypokalemia (ED), Acute Diarrhea (ED)


Referrals: 


Lin Lima MD [Primary Care Provider] - 2 Days


Additional Instructions: 


Please return outpatient stool samples to the ER to collect. 


RETURN TO THE ER FOR ANY NEW OR WORSENING SYMPTOMS








The documentation as recorded by the Jhonny howell Stephanie accurately reflects 

the service I personally performed and the decisions made by Yonny deutsch Barbara J, MD.

## 2018-03-02 NOTE — ED
Blanca MOSER Nilda, scribed for Cuca Blancas MD on 03/02/18 at 1401 .





GI/ HPI





- HPI Summary


HPI Summary: 


This patient is a 47 year old F BIBA accompanied by cousin with a chief 

complaint of constant diffuse abd cramping with nausea, vomiting, and diarrhea (

mucous) that began on 2/24/18. The patient rates the pain 8/10 in severity. 

Symptoms aggravated and alleviated by nothing. Patient reports dizziness (today)

. The pt was seen yesterday at [facility] for same symptoms and was unable to 

provide stool sample since she had to return home to care for her disabled son. 

Pt was D/C yesterday with supplies to collect stool sample herself. She is 

unable to provide home stool sample since she called EMS this morning. Pt 

reports she had been on z-pack for bronchitis, and Dr. Lima (PCP) initially 

sent pt in with concern for possible C. diff. 





- History of Current Complaint


Chief Complaint: EDAbdPain


Stated Complaint: DIZZINESS


Hx Obtained From: Patient


Onset/Duration: Started Days Ago - 6, Atraumatic, Still Present


Timing: Constant


Severity: Severe


Current Severity: Severe


Pain Intensity: 8


Location of Pain: Diffuse


Pain Characteristics: Cramping


Associated Signs and Symptoms: Positive: Dizziness, Nausea, Vomiting, Diarrhea, 

Other: - dizziness, N/V/D.  Negative: Blood-Streaked Stool, Black Tarry Stool, 

Bright Red Blood w/Stool, Blood w/Stool, Fever, Dysuria, Chills, UTI Symptoms


Aggravating Factor(s): Nothing


Alleviating Factor(s): Nothing





- Additional Pertinent History


Primary Care Physician: BRAULIO





- Allergy/Home Medications


Allergies/Adverse Reactions: 


 Allergies











Allergy/AdvReac Type Severity Reaction Status Date / Time


 


ibuprofen Allergy Intermediate Abdominal Verified 03/02/18 12:57





   Pain  














PMH/Surg Hx/FS Hx/Imm Hx


Previously Healthy: No


Endocrine/Hematology History: Reports: Hx Diabetes, Hx Thyroid Disease - hyper


   Denies: Hx Anemia


Cardiovascular History: Reports: Hx Hypertension


   Denies: Hx Congestive Heart Failure, Hx Pacemaker/ICD


Respiratory History: Reports: Hx Asthma


   Denies: Hx Chronic Obstructive Pulmonary Disease (COPD)


GI History: Reports: Other GI Disorders - patient states chronic nausea


   Denies: Hx Jaundice


 History: 


   Denies: Hx Dialysis, Hx Renal Disease


Musculoskeletal History: Reports: Hx Arthritis, Hx Back Problems, Hx 

Fibromyalgia


   Comment Only: Other Musculoskeletal History - fibromyalgia


Sensory History: Reports: Hx Contacts or Glasses


   Denies: Hx Hearing Aid


Opthamlomology History: Reports: Hx Contacts or Glasses


Neurological History: Reports: Hx Migraine, Other Neuro Impairments/Disorders - 

vertigo


Psychiatric History: Reports: Hx Anxiety, Hx Depression, Hx Panic Disorder, Hx 

Suicide Attempt


   Denies: Hx Eating Disorder





- Cancer History


Hx Chemotherapy: No


Hx Radiation Therapy: No





- Surgical History


Surgery Procedure, Year, and Place: PARTIAL HYSTERECTOMY 3/2000, TUBAL 1997, 

GALLBLADDER 1995





- Immunization History


Date of Tetanus Vaccine: Unk


Date of Influenza Vaccine: None Fall 2014


Infectious Disease History: Yes


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 

Tuberculosis - pt reports ppd positive but chest xray negative.


   Denies: Traveled Outside the [place] in Last 30 Days





- Family History


Known Family History: Positive: Other - negative FHx of breast CA


   Negative: Cardiac Disease, Hypertension, Diabetes





- Social History


Lives: With Family


Alcohol Use: None


Hx Substance Use: No


Substance Use Type: Reports: None


Substance Use Comment - Amount & Last Used: Unknown


Hx Tobacco Use: No


Smoking Status (MU): Never Smoked Tobacco


Have You Smoked in the Last Year: No





Review of Systems


Constitutional: Negative


Cardiovascular: Negative


Respiratory: Negative


Positive: Abdominal Pain, Vomiting, Diarrhea, Nausea


Neurological: Other - dizziness


Psychological: Normal


All Other Systems Reviewed And Are Negative: Yes





Physical Exam





- Summary


Physical Exam Summary: 


Appearance: Ill-appearing, moderate pain distress, Well-nourished, Tearful


Skin: Warm, color reflects adequate perfusion


Head: Normal Head/Face inspection


Eyes: Conjunctiva clear


ENT: Normal inspection


Neck: Supple, no nodes, no JVD.


Respiratory: Lungs clear, Normal breath sounds, no respiratory distress


Cardio: RRR, No murmur, pulses normal, brisk capillary refill


Abdomen: soft, mild diffuse abdominal tenderness, no guarding, no rebound, no 

masses 


Bowel sounds: present 


Musculoskeletal: Strength Intact/ ROM intact. No calf tenderness. No edema.


Neuro: Alert, muscle tone normal, facial symmetry, speech normal, sensory/motor 

intact 


Psychological: Tearful


Triage Information Reviewed: Yes


Vital Signs On Initial Exam: 


 Initial Vitals











BP


 


 145/83 


 


 03/02/18 13:04











Vital Signs Reviewed: Yes





Diagnostics





- Vital Signs


 Vital Signs











  Temp Pulse Resp BP Pulse Ox


 


 03/02/18 13:06  99.2 F  90  18  145/83  100


 


 03/02/18 13:04     145/83 














- Laboratory


Lab Results: 


 Lab Results











  03/02/18 03/02/18 03/02/18 Range/Units





  13:50 13:50 13:50 


 


WBC   7.7   (3.5-10.8)  10^3/ul


 


RBC   4.75   (4.0-5.4)  10^6/ul


 


Hgb   14.2   (12.0-16.0)  g/dl


 


Hct   42   (35-47)  %


 


MCV   88   (80-97)  fL


 


MCH   30   (27-31)  pg


 


MCHC   34   (31-36)  g/dl


 


RDW   14   (10.5-15)  %


 


Plt Count   368   (150-450)  10^3/ul


 


MPV   7 L   (7.4-10.4)  um3


 


Neut % (Auto)   60.7   (38-83)  %


 


Lymph % (Auto)   27.2   (25-47)  %


 


Mono % (Auto)   11.0 H   (0-7)  %


 


Eos % (Auto)   0.4   (0-6)  %


 


Baso % (Auto)   0.7   (0-2)  %


 


Absolute Neuts (auto)   4.7   (1.5-7.7)  10^3/ul


 


Absolute Lymphs (auto)   2.1   (1.0-4.8)  10^3/ul


 


Absolute Monos (auto)   0.8   (0-0.8)  10^3/ul


 


Absolute Eos (auto)   0   (0-0.6)  10^3/ul


 


Absolute Basos (auto)   0.1   (0-0.2)  10^3/ul


 


Absolute Nucleated RBC   0   10^3/ul


 


Nucleated RBC %   0   


 


INR (Anticoag Therapy)     (0.77-1.02)  


 


APTT     (26.0-36.3)  seconds


 


Sodium  139    (133-145)  mmol/L


 


Potassium  3.4 L    (3.5-5.0)  mmol/L


 


Chloride  107    (101-111)  mmol/L


 


Carbon Dioxide  21 L    (22-32)  mmol/L


 


Anion Gap  11    (2-11)  mmol/L


 


BUN  12    (6-24)  mg/dL


 


Creatinine  0.69    (0.51-0.95)  mg/dL


 


Est GFR ( Amer)  116.8    (>60)  


 


Est GFR (Non-Af Amer)  90.8    (>60)  


 


BUN/Creatinine Ratio  17.4    (8-20)  


 


Glucose  104 H    ()  mg/dL


 


Lactic Acid    1.7  (0.5-2.0)  mmol/L


 


Calcium  9.8    (8.6-10.3)  mg/dL


 


Magnesium  2.2    (1.9-2.7)  mg/dL


 


Total Bilirubin  0.70    (0.2-1.0)  mg/dL


 


AST  24    (13-39)  U/L


 


ALT  38    (7-52)  U/L


 


Alkaline Phosphatase  74    ()  U/L


 


Total Creatine Kinase  30    ()  U/L


 


Troponin I  0.00    (<0.04)  ng/mL


 


C-Reactive Protein  13.95 H    (< 5.00)  mg/L


 


Total Protein  7.9    (6.4-8.9)  g/dL


 


Albumin  4.2    (3.2-5.2)  g/dL


 


Globulin  3.7    (2-4)  g/dL


 


Albumin/Globulin Ratio  1.1    (1-3)  


 


Amylase  60    ()  U/L


 


Lipase  21    (11.0-82.0)  U/L


 


Beta HCG, Quant  < 0.60    mIU/mL


 


Urine Color     


 


Urine Appearance     


 


Urine pH     (5-9)  


 


Ur Specific Gravity     (1.010-1.030)  


 


Urine Protein     (Negative)  


 


Urine Ketones     (Negative)  


 


Urine Blood     (Negative)  


 


Urine Nitrate     (Negative)  


 


Urine Bilirubin     (Negative)  


 


Urine Urobilinogen     (Negative)  


 


Ur Leukocyte Esterase     (Negative)  


 


Urine Glucose     (Negative)  














  03/02/18 03/02/18 Range/Units





  13:50 14:45 


 


WBC    (3.5-10.8)  10^3/ul


 


RBC    (4.0-5.4)  10^6/ul


 


Hgb    (12.0-16.0)  g/dl


 


Hct    (35-47)  %


 


MCV    (80-97)  fL


 


MCH    (27-31)  pg


 


MCHC    (31-36)  g/dl


 


RDW    (10.5-15)  %


 


Plt Count    (150-450)  10^3/ul


 


MPV    (7.4-10.4)  um3


 


Neut % (Auto)    (38-83)  %


 


Lymph % (Auto)    (25-47)  %


 


Mono % (Auto)    (0-7)  %


 


Eos % (Auto)    (0-6)  %


 


Baso % (Auto)    (0-2)  %


 


Absolute Neuts (auto)    (1.5-7.7)  10^3/ul


 


Absolute Lymphs (auto)    (1.0-4.8)  10^3/ul


 


Absolute Monos (auto)    (0-0.8)  10^3/ul


 


Absolute Eos (auto)    (0-0.6)  10^3/ul


 


Absolute Basos (auto)    (0-0.2)  10^3/ul


 


Absolute Nucleated RBC    10^3/ul


 


Nucleated RBC %    


 


INR (Anticoag Therapy)  1.01   (0.77-1.02)  


 


APTT  32.6   (26.0-36.3)  seconds


 


Sodium    (133-145)  mmol/L


 


Potassium    (3.5-5.0)  mmol/L


 


Chloride    (101-111)  mmol/L


 


Carbon Dioxide    (22-32)  mmol/L


 


Anion Gap    (2-11)  mmol/L


 


BUN    (6-24)  mg/dL


 


Creatinine    (0.51-0.95)  mg/dL


 


Est GFR ( Amer)    (>60)  


 


Est GFR (Non-Af Amer)    (>60)  


 


BUN/Creatinine Ratio    (8-20)  


 


Glucose    ()  mg/dL


 


Lactic Acid    (0.5-2.0)  mmol/L


 


Calcium    (8.6-10.3)  mg/dL


 


Magnesium    (1.9-2.7)  mg/dL


 


Total Bilirubin    (0.2-1.0)  mg/dL


 


AST    (13-39)  U/L


 


ALT    (7-52)  U/L


 


Alkaline Phosphatase    ()  U/L


 


Total Creatine Kinase    ()  U/L


 


Troponin I    (<0.04)  ng/mL


 


C-Reactive Protein    (< 5.00)  mg/L


 


Total Protein    (6.4-8.9)  g/dL


 


Albumin    (3.2-5.2)  g/dL


 


Globulin    (2-4)  g/dL


 


Albumin/Globulin Ratio    (1-3)  


 


Amylase    ()  U/L


 


Lipase    (11.0-82.0)  U/L


 


Beta HCG, Quant    mIU/mL


 


Urine Color   Yellow  


 


Urine Appearance   Clear  


 


Urine pH   8.0  (5-9)  


 


Ur Specific Gravity   1.015  (1.010-1.030)  


 


Urine Protein   Negative  (Negative)  


 


Urine Ketones   1+ A  (Negative)  


 


Urine Blood   Negative  (Negative)  


 


Urine Nitrate   Negative  (Negative)  


 


Urine Bilirubin   Negative  (Negative)  


 


Urine Urobilinogen   Negative  (Negative)  


 


Ur Leukocyte Esterase   Negative  (Negative)  


 


Urine Glucose   Negative  (Negative)  











Result Diagrams: 


 03/02/18 13:50





 03/02/18 13:50


Lab Statement: Any lab studies that have been ordered have been reviewed, and 

results considered in the medical decision making process.





- EKG


  ** 1329


Cardiac Rate: NL


EKG Rhythm: Sinus Rhythm - 88 bpm


ST Segment: Non-Specific


Ectopy: None


EKG Interpretation: nml AVIVCT, nml QTc, and nml axis. Left axis -17. 


EKG Comparison: No Significant Change - from 3/1/18.





Re-Evaluation





- Re-Evaluation


  ** First Eval


Re-Evaluation Time: 15:10


Change: Unchanged


Comment: Reviewed labs with pt. Pt is still dizzy.





  ** Second Eval


Re-Evaluation Time: 18:38


Change: Unchanged


Comment: Pt is still dizzy and has a headache. Starting her with crackers and 

ginger ale. Pt has not had V/D in ED except to provide initial stool sample.





  ** Third Eval


Re-Evaluation Time: 18:45


Change: Improved


Comment: Will give daily medications in ED to ensure pt does not vomit them.





GIGU Course/Dx





- Course


Assessment/Plan: This pt is a 46 y/o F BIBA with a CC of diffuse abd cramping 

with N/V/D, dizziness and recent Hx of bronchitis that was treated with Z-itzel, 

with concern for C. diff.  Pt returns to ED after visit in ED yesterday when 

she was unable to provide stool and had to leave in order to care for her 

disabled son.  Pending labs and EKG.  Labs reveal potassium 3.4, negative C. 

Diff, negative stool occult blood, and negative fecal Lactoferrin.  An EKG 

reveals NSR (88 bpm), nonspecific ST hanges, no ectopy, nml AVIVCT, nml QTc, 

and nml axis. Left axis -17. No significant change from 3/1/18 EKG.  Pt 

medications reviewed this visit.  High blood pressure noted. Allergies Noted.  

Pt will be D/C with Dx of HTN under poor control, dizziness, hypokalemia, and 

diarrhea, and a prescription for Potassium Chlor Tab. Pt was advised to f/u 

with PCP. Pt understands and is agreeable with this plan.





- Diagnoses


Differential Diagnoses - Female: Colitis, Diarrhea, Gastroenteritis (Viral), 

Gastroenteritis (Bacterial), Vomiting


Provider Diagnoses: 


 Hypertension, poor control, Dizziness, Diarrhea, Hypokalemia








Discharge





- Discharge Plan


Condition: Stable


Disposition: HOME


Prescriptions: 


Potassium Chlor TAB* [Potassium Chlor TAB 20 MEQ*] 20 meq PO DAILY #5 tab.er


Patient Education Materials:  Hypokalemia (ED), Acute Diarrhea (ED), Dizziness (

ED)


Referrals: 


Lin Lima MD [Primary Care Provider] - 2 Days


Additional Instructions: 


You were given IV fluids, zofran for nausea, potassium, and then your usual 

medications of roxycodone, benadryl and valium while you were in the ER.


You did not have the antibiotic associated colitis, so there is no medicine 

needed to treat that at this time.


There are still other stool studies that are not resulted on you yet, that will 

take a few days.


We will notify you if you need further treatment based on those pending stool 

studies.


Return to the ER if you have any new or worsening symptoms.  





The documentation as recorded by the Blanca howell Nilda accurately 

reflects the service I personally performed and the decisions made by me, Cuca Blancas MD.

## 2018-07-13 ENCOUNTER — HOSPITAL ENCOUNTER (EMERGENCY)
Dept: HOSPITAL 25 - ED | Age: 48
Discharge: HOME | End: 2018-07-13
Payer: MEDICARE

## 2018-07-13 VITALS — DIASTOLIC BLOOD PRESSURE: 101 MMHG | SYSTOLIC BLOOD PRESSURE: 154 MMHG

## 2018-07-13 DIAGNOSIS — S60.221A: ICD-10-CM

## 2018-07-13 DIAGNOSIS — Z23: ICD-10-CM

## 2018-07-13 DIAGNOSIS — S01.111A: ICD-10-CM

## 2018-07-13 DIAGNOSIS — S01.81XA: Primary | ICD-10-CM
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
## 2018-07-13 DIAGNOSIS — Z88.6: ICD-10-CM

## 2018-07-13 PROCEDURE — 90471 IMMUNIZATION ADMIN: CPT

## 2018-07-13 PROCEDURE — 12011 RPR F/E/E/N/L/M 2.5 CM/<: CPT

## 2018-07-13 PROCEDURE — 99282 EMERGENCY DEPT VISIT SF MDM: CPT

## 2018-07-13 PROCEDURE — 90715 TDAP VACCINE 7 YRS/> IM: CPT

## 2018-07-13 PROCEDURE — 70450 CT HEAD/BRAIN W/O DYE: CPT

## 2018-07-13 NOTE — XMS REPORT
Marielos Severino

 Created on:2018



Patient:Marielos Severino

Sex:Female

:1970

External Reference #:[id number]





Demographics







 Address  [address] [place] [address]

 

 Mobile Phone  [phone]

 

 Email Address  [email or web address]

 

 Preferred Language  English

 

 Marital Status   Or 

 

 Catholic Affiliation  Unknown

 

 Race  Other Race

 

 Ethnic Group   Or 









Author







 Organization  [facility]

 

 Address  [address] [place] [address]

 

 Phone  [phone]









Support







 Name  Relationship  Address  Phone

 

 Raisa Knight  Daughter  Unavailable  [phone]

 

 Anshul Gallagher  Son  Unavailable  [phone]









Care Team Providers







 Name  Role  Phone

 

 Lin Lima M.D.  Care Team Information   Unavailable

 

 Lin Lima M.D.  Primary Care Physician  Unavailable









Payers







 Type  Date  Identification Numbers  Payment Provider  Subscriber

 

 Medicare Primary  Effective:  Policy Number:  [place]  Marielos Severino



   2017  [id number]    









 Expires: 2018  PayID: [id number]  [address]









 [place] [address]









 Medigap Part B  Effective:  Policy Number:  [place]are [place]  Marielos Severino



   2018  [id number]    









 PayID: [id number]  [address]









 [place] [address]









 Medicaid  Effective: 2017  Policy Number: [id number]  [place]icaid [place]  Marielos Severino









 PayID: [id number]  [address]









 [facility] [place] [address]







Problems







 Date  Description  Provider  Status

 

 Onset: 2016  Type 2 diabetes mellitus  ROSALINDA Bryan  Active

 

 Onset: 2016  Low back pain  ROSALINDA Bryan  Active

 

 Onset: 2016  Moderate recurrent major depression  Lin Lima M.D.  
Active

 

 Onset: 2016  Chronic back pain  Lin Lima M.D.  Active

 

 Onset: 2016  Essential hypertension  Lin Lima M.D.  Active

 

 Onset: 2016  Fibromyalgia  Lin Lima M.D.  Active

 

 Onset: 2017  H/O: attempted suicide  Lin Lima M.D.  Active









   Note: narcotic overdose









 Onset: 2017  Hyperlipidemia  Lin Lima M.D.  Active

 

 Onset: 2018  Plantar fasciitis  Lin Lima M.D.  Active

 

 Onset: 2018  Irritable bowel syndrome  Lin Lmia M.D.  Active







Family History







 Date  Family Member(s)  Problem(s)  Comments

 

   Father   due to cholecystitis  ()

 

   Mother  Cancer  thyroid

 

   First Son  Prader Willi  

 

   First Son  Developmentally disabled  







Social History







 Type  Date  Description  Comments

 

 Lives With    Daughter  

 

 Lives With    Son  

 

 Lives With    Boyfriend  

 

 Occupation    Not Currently Working  

 

 Occupation    Disabled  

 

 Cigarette Use    Never Smoked Cigarettes  

 

 ETOH Use    Denies alcohol use  

 

 Smoking    Patient has never smoked  

 

 Exercise Type/Frequency    Exercises rarely  







Allergies, Adverse Reactions, Alerts







 Date  Description  Reaction  Status  Severity  Comments

 

 2016  Motrin  Nausea and Vomiting  active    







Medications







 Medication  Date  Status  Form  Strength  Qnty  SIG  Indications  Ordering



                 Provider

 

 Naproxen    Active  Tablets  500mg  60tab  take 1            s  tablet    Elmore,



             every 12    M.D.



             hours as    



             needed    



             with food    

 

 Mometasone Furoate    Active  Cream  0.1%  45gm  apply to              arms twice    ,



             a day x 1    M.D.



             week then    



             as needed    

 

 Ranitidine HCL    Active  Tablets  150mg  60tab  1 tab by  R19.7          s  mouth    Elmore,



             twice a    M.D.



             day    

 

 Methocarbamol    Active  Tablets  750mg  60tab  1 tab by            s  mouth    Elmore,



             twice a    M.D.



             day as    



             needed    

 

 Nitroglycerin  10/23  Active  Tablets  0.4mg  14tab  1 sl q5        Sub    s  min x 3    Elmore,



             doses as    M.D.



             needed for    



             chest    



             pain; if    



             no relief    



             after 3    



             doses call    



             911    

 

 Lisinopril  10/23  Active  Tablets  10mg  30tab  1 by mouth  I10          s  every day    Elmore,



                 M.D.

 

 Diphenhydramine    Active  Tablets  25mg  60tab  1-2 tabs  R11.10  Lin



 HCL  /2017        s  by mouth    Elmore,



             every 6    M.D.



             hours as    



             needed    

 

 Metoclopramide HCL    Active  Tablets  10mg  30tab  take one  R11.10  
Óscar TLuis F



           s  tablet by    Eulalia diallo MD



             every 8    



             hours as    



             needed for    



             nausea    

 

 Zofran Odt    Active  Tablets  4mg  30tab  dissolve 1        Dispers    s  tab under    Clarence,



             tongue    M.DLuis F



             every 6    



             hours as    



             needed    



             nausea    

 

 Onetouch Ultra 2  10/24  Active  Kit  w/Device  1unit  test blood    Óscar LAI



           s  sugar once    Breiman,



             a day or    M.D.



             as    



             directed    



             dx: e11.9    



             last    



             office    



             visit    



             10/19/16    

 

 Ventolin HFA  10/17  Active  Aerosol  108(90Bas  18uni  inhale two  J06.9  
      e)  ts  puffs by    Juan,



         mcg/Act    mouth qid    Afnp-C



             prn    



             cough/whee    



             ze    

 

 Januvia    Active  Tablets  50mg  30tab  1 tab by    Lin



           s  mouth    Clarence,



             every day    M.D.

 

 Oxycodone HCL    Active  Tablets  15mg    tid as    Unknown



   /          needed Dr Samson    

 

 Valium    Active  Tablets  10mg    as needed    Unknown



   /0000          Dr Vera    

 

 Singulair    Active  Tablets  10mg    1 by mouth    Unknown



             every day    

 

 Zyrtec Allergy    Active  Tablets  10mg    prn    Unknown



   /0000              

 

 Effexor XR    Active  Caps ER  150mg    1 by mouth    Unknown



       24HR      every day    



             along with    



             75 mg    

 

 Effexor XR    Active  Caps ER  75mg    1 by mouth    Unknown



       24HR      every day    



             along with    



             150 mg    

 

 Gabapentin    Active  Capsules  300mg    1 po qid    Unknown



   /0000              

 

 Atorvastatin    Active  Tablets  10mg  30tab  1 by mouth    Lin



 Calcium  /0000        s  every day    ADELE Lima

 

 Cyanocobalamin    Active  Solution  1000mcg/M    Dr Samson    Unknown



   /0000      L        

 

 Abilify    Active  Tablets  2mg    1 by mouth    Unknown



             every    



             morning    

 

                 

 

 Azithromycin    Hx  Tablets  250mg  6tabs  2 by mouth              every day    Juan,



   -          x1 then 1    Afnp-C



             by mouth    



   /          every day    



             x 4 more    



             days    

 

 Cyclobenzaprine    Hx  Tablets  10mg  90tab  take one    Lin



 HCL  /2017        s  tablet by    Clarence,



   -          mouth    M.D.



             every           hours as    



             needed    

 

 Bactrim DS    Hx  Tablets  800-160mg  10tab  1 by mouth    Óscar LAI



   /        s  twice a    Breimaart,



   -          day    M.D.



                 

 

 Ondansetron    Hx  Tablets  4mg  30tab  1 tab by  R11.10      Dispers    s  mouth    Elmore,



   -          every 6    M.D.



             hours as    



   /2018          needed    

 

 Azithromycin  10/17  Hx  Tablets  250mg  12tab  take 2  J06.9          s  tablets by    Nisha,



   -          mouth x 3d    FNP



             then take    



             1 tablet    



             daily for    



             next 6    



             days    

 

 Januvia    Hx  Tablets  100mg  30tab  1 by mouth            s  every day    Elmore,



   -              M.D.



                 

 

 Sphygmomanometer    Hx  Misc      take blood  I10            pressure    Elmore,



   -          daily    M.D.



                 

 

 Truetrack Test    Hx  Strips    100un  test once  E11.9          its  a day Dx:    Elmore,



   -          E11.9    M.D.



   10/24          Last    



   /2016          office    



             16    

 

 Lisinopril    Hx  Tablets  5mg  30tab  Take One  I10          s  Tablet By    Elmore,



   -          Mouth    M.D.



   10/23          Every Day    



   /2017              

 

 Effexor XR  0000  Hx  Caps ER  37.5mg    1 by mouth    Unknown



   /0000    24HR      qd w/75mg    



   -              



                 

 

 Effexor XR  0000  Hx  Caps ER  75mg    1 by mouth    Unknown



   /0000    24HR      every day    



   -          w/37.5    



                 

 

 Albuterol Sulfate  00  Hx  Powder      2 puffs    Unknown



   /0000          every 4    



   -          hours as    



                 

 

 Albuterol Sulfate    Hx  Nebulizer  1.25mg/3M    3ml per    Unknown



   /0000      L    nebulizer    



   -          every 6    



             hours as    



   /2018          needed    

 

 Effexor XR  0000  Hx  Caps ER  150mg    1 by mouth    Unknown



   /0000    24HR      every day    



   -              



                 

 

 Lipitor  00  Hx  Tablets  20mg    1 by mouth    Unknown



   /0000          every day    



   -              



   10/23              



   /2017              







Vital Signs







 Date  Vital  Result  Comment

 

 2018  BP Systolic  122 mmHg  









 BP Diastolic  80 mmHg  

 

 Heart Rate  80 /min  

 

 Body Temperature  97.5 F  

 

 Height  61 inches  5'1"

 

 Weight  163.00 lb  

 

 BMI (Body Mass Index)  30.8 kg/m2  









 2018  BP Systolic  120 mmHg  









 BP Diastolic  70 mmHg  

 

 Heart Rate  76 /min  

 

 Body Temperature  98.2 F  

 

 Respiratory Rate  16 /min  

 

 Height  61 inches  5'1"

 

 Weight  160.00 lb  

 

 BMI (Body Mass Index)  30.2 kg/m2  









 2018  BP Systolic  138 mmHg  









 BP Diastolic  80 mmHg  

 

 Heart Rate  96 /min  

 

 Body Temperature  98.8 F  

 

 Respiratory Rate  16 /min  

 

 Height  61 inches  5'1"

 

 Weight  163.00 lb  

 

 BMI (Body Mass Index)  30.8 kg/m2  









 2018  BP Systolic  130 mmHg  









 BP Diastolic  60 mmHg  

 

 Heart Rate  76 /min  

 

 Body Temperature  98.6 F  

 

 Respiratory Rate  16 /min  

 

 Height  61 inches  5'1"

 

 Weight  164.00 lb  

 

 BMI (Body Mass Index)  31.0 kg/m2  









 2018  BP Systolic  136 mmHg  









 BP Diastolic  78 mmHg  

 

 Heart Rate  76 /min  

 

 Body Temperature  99.1 F  

 

 Respiratory Rate  16 /min  

 

 Height  61 inches  5'1"

 

 Weight  161.00 lb  

 

 BMI (Body Mass Index)  30.4 kg/m2  









 2018  BP Systolic  124 mmHg  









 BP Diastolic  80 mmHg  

 

 Heart Rate  84 /min  

 

 Body Temperature  98.4 F  

 

 Respiratory Rate  16 /min  

 

 Height  61 inches  5'1"

 

 Weight  174.12 lb  

 

 BMI (Body Mass Index)  32.9 kg/m2  









 2018  BP Systolic  144 mmHg  









 BP Diastolic  84 mmHg  

 

 Heart Rate  74 /min  

 

 Body Temperature  98.1 F  

 

 Respiratory Rate  16 /min  

 

 Height  61 inches  5'1"

 

 Weight  173.00 lb  

 

 BMI (Body Mass Index)  32.7 kg/m2  









 2017  BP Systolic  120 mmHg  









 BP Diastolic  80 mmHg  

 

 Heart Rate  74 /min  

 

 Body Temperature  98.8 F  

 

 Respiratory Rate  16 /min  

 

 Height  61 inches  5'1"

 

 Weight  170.00 lb  

 

 BMI (Body Mass Index)  32.1 kg/m2  









 2017  BP Systolic  120 mmHg  









 BP Diastolic  80 mmHg  

 

 Heart Rate  68 /min  

 

 Body Temperature  98.6 F  

 

 Respiratory Rate  18 /min  

 

 Height  61 inches  5'1"

 

 Weight  166.00 lb  

 

 BMI (Body Mass Index)  31.4 kg/m2  









 10/23/2017  BP Systolic  110 mmHg  









 BP Diastolic  70 mmHg  

 

 Heart Rate  76 /min  

 

 Body Temperature  98.8 F  

 

 Respiratory Rate  18 /min  

 

 Height  61 inches  5'1"

 

 Weight  167.00 lb  

 

 BMI (Body Mass Index)  31.6 kg/m2  









 10/02/2017  BP Systolic  120 mmHg  









 BP Diastolic  60 mmHg  

 

 Heart Rate  84 /min  

 

 Body Temperature  97.9 F  

 

 Respiratory Rate  16 /min  

 

 Height  61 inches  5'1"

 

 Weight  166.38 lb  

 

 BMI (Body Mass Index)  31.4 kg/m2  









 2017  BP Systolic  134 mmHg  









 BP Diastolic  78 mmHg  

 

 Heart Rate  80 /min  

 

 Body Temperature  98.6 F  

 

 Respiratory Rate  18 /min  

 

 Height  61 inches  5'1"

 

 Weight  163.00 lb  

 

 BMI (Body Mass Index)  30.8 kg/m2  









 2017  BP Systolic  132 mmHg  









 BP Diastolic  78 mmHg  

 

 Heart Rate  84 /min  

 

 Body Temperature  98.0 F  

 

 Respiratory Rate  16 /min  

 

 Height  61 inches  5'1"

 

 Weight  163.00 lb  

 

 BMI (Body Mass Index)  30.8 kg/m2  









 2017  BP Systolic  130 mmHg  









 BP Diastolic  80 mmHg  

 

 Heart Rate  76 /min  

 

 Respiratory Rate  16 /min  

 

 Height  61 inches  5'1"

 

 Weight  160.00 lb  

 

 BMI (Body Mass Index)  30.2 kg/m2  









 2017  BP Systolic  100 mmHg  









 BP Diastolic  70 mmHg  

 

 Heart Rate  88 /min  

 

 Body Temperature  98.6 F  

 

 Respiratory Rate  16 /min  

 

 Height  61 inches  5'1"

 

 Weight  151.00 lb  

 

 BMI (Body Mass Index)  28.5 kg/m2  









 2017  BP Systolic  130 mmHg  









 BP Diastolic  72 mmHg  

 

 Heart Rate  84 /min  

 

 Body Temperature  97.3 F  

 

 Height  61 inches  5'1"

 

 Weight  160.00 lb  

 

 BMI (Body Mass Index)  30.2 kg/m2  









 2016  BP Systolic  118 mmHg  









 BP Diastolic  76 mmHg  

 

 Heart Rate  78 /min  

 

 Body Temperature  98.2 F  

 

 Respiratory Rate  16 /min  

 

 Height  61 inches  5'1"

 

 Weight  158.00 lb  

 

 BMI (Body Mass Index)  29.9 kg/m2  









 10/17/2016  BP Systolic  110 mmHg  









 BP Diastolic  60 mmHg  

 

 Heart Rate  80 /min  

 

 Body Temperature  98.8 F  

 

 Respiratory Rate  18 /min  

 

 O2 % BldC Oximetry  98 %  

 

 Height  61 inches  5'1"

 

 Weight  145.00 lb  

 

 BMI (Body Mass Index)  27.4 kg/m2  









 2016  BP Systolic  120 mmHg  









 BP Diastolic  70 mmHg  

 

 Heart Rate  84 /min  

 

 Body Temperature  97.7 F  

 

 Respiratory Rate  16 /min  

 

 Height  61 inches  5'1"

 

 Weight  145.00 lb  

 

 BMI (Body Mass Index)  27.4 kg/m2  









 2016  BP Systolic  120 mmHg  









 BP Diastolic  76 mmHg  

 

 Heart Rate  80 /min  

 

 Body Temperature  98.2 F  

 

 Respiratory Rate  16 /min  

 

 Height  61 inches  5'1"

 

 Weight  143.00 lb  

 

 BMI (Body Mass Index)  27.0 kg/m2  









 2016  BP Systolic  118 mmHg  









 BP Diastolic  80 mmHg  

 

 Heart Rate  80 /min  

 

 Body Temperature  98.4 F  

 

 Respiratory Rate  16 /min  

 

 Height  61 inches  5'1"

 

 Weight  143.25 lb  

 

 BMI (Body Mass Index)  27.1 kg/m2  







Results







 Test  Date  Test  Result  H/L  Range  Note

 

 Basic Metabolic Profile  2018  Sodium  141 mEq/L    134-149  









 Potassium  3.6 mEq/L    3.6-5.5  

 

 Chloride  100 mEq/L      

 

 Carbon Dioxide  27 mEq/L    21-32  

 

 Glucose  115 mg/dL  High    1

 

 BUN  12 mg/dL    6-26  

 

 Creatinine  0.7 mg/dL    0.6-1.4  

 

 BUN/Creat Ratio  17.1 CALC    8.0-36.0  

 

 Calcium  10.2 mg/dL    8.6-10.2  

 

 GFR Non-African American  >60 ml/min/1.73m^    >=60  

 

 GFR African American  >60 ml/min/1.73m^    >=60  









 Laboratory test finding  2018  Blood Culture  SEE RESULT BELOW      2

 

 Laboratory test finding  2018  Stool Occult Blood  SEE RESULT BELOW      
3









 Stool Culture  SEE RESULT BELOW      4

 

 C Difficile PCR  SEE RESULT BELOW      5









 Urinalysis Profile  2018  Urine Color  Yellow      









 Urine Appearance  Clear      

 

 Urine Specific Gravity  1.015    1.010-1.030  

 

 Urine pH  8.0    5-9  

 

 Urine Urobilinogen  Negative    Negative  

 

 Urine Ketones  1+    Negative  

 

 Urine Protein  Negative    Negative  

 

 Urine Leukocytes  Negative    Negative  

 

 Urine Blood  Negative    Negative  

 

 Urine Nitrite  Negative    Negative  

 

 Urine Bilirubin  Negative    Negative  

 

 Urine Glucose  Negative    Negative  









 Venous Blood Gas  2018  Venous Blood pH  7.48  High  7.33-7.43  









 Venous Pco2  31 mmHg  Low  41-51  

 

 Venous Po2  42 mmHg    35-45  

 

 Venous O2 Saturation  85.5 %  High  70-80  

 

 Venous Blood Base Excess  0.3    0-4  6

 

 Venous Bicarbonate Hco3  24.8 mmol/L    24-28  









 Laboratory test finding  2018  Point of Care Glucose  103 mg/dL  High  70
-100  7

 

 Laboratory test finding  2018  Inr/Protime  0.99    0.77-1.02  

 

 CBC Auto Diff  2018  White Blood Count  9.5 10^3/uL    3.5-10.8  









 Red Blood Count  4.54 10^6/uL    4.0-5.4  

 

 Hemoglobin  13.6 g/dL    12.0-16.0  

 

 Hematocrit  40 %    35-47  

 

 Mean Corpuscular Volume  88 fL    80-97  

 

 Mean Corpuscular Hemoglobin  30 pg    27-31  

 

 Mean Corpuscular HGB Conc  34 g/dL    31-36  

 

 Red Cell Distribution Width  14 %    10.5-15  

 

 Platelet Count  376 10^3/uL    150-450  

 

 Mean Platelet Volume  7 um3  Low  7.4-10.4  

 

 Abs Neutrophils  5.9 10^3/uL    1.5-7.7  

 

 Abs Lymphocytes  2.7 10^3/uL    1.0-4.8  

 

 Abs Monocytes  0.8 10^3/uL    0-0.8  

 

 Abs Eosinophils  0 10^3/uL    0-0.6  

 

 Abs Basophils  0.1 10^3/uL    0-0.2  

 

 Abs Nucleated RBC  0 10^3/uL      

 

 Granulocyte %  61.9 %    38-83  

 

 Lymphocyte %  28.2 %    25-47  

 

 Monocyte %  8.8 %  High  0-7  

 

 Eosinophil %  0.3 %    0-6  

 

 Basophil %  0.8 %    0-2  

 

 Nucleated Red Blood Cells %  0      









 Laboratory test finding  2018  Lactic Acid  1.3 mmol/L    0.5-2.0  8

 

 Comp Metabolic Panel  2018  Sodium  137 mmol/L    133-145  









 Potassium  3.2 mmol/L  Low  3.5-5.0  

 

 Chloride  106 mmol/L    101-111  

 

 Co2 Carbon Dioxide  21 mmol/L  Low  22-32  

 

 Anion Gap  10 mmol/L    2-11  

 

 Glucose  102 mg/dL  High    

 

 Blood Urea Nitrogen  16 mg/dL    6-24  

 

 Creatinine  0.64 mg/dL    0.51-0.95  

 

 BUN/Creatinine Ratio  25.0  High  8-20  

 

 Calcium  9.8 mg/dL    8.6-10.3  

 

 Total Protein  7.9 g/dL    6.4-8.9  

 

 Albumin  4.1 g/dL    3.2-5.2  

 

 Globulin  3.8 g/dL    2-4  

 

 Albumin/Globulin Ratio  1.1    1-3  

 

 Total Bilirubin  0.60 mg/dL    0.2-1.0  

 

 Alkaline Phosphatase  67 U/L      

 

 Alt  31 U/L    7-52  

 

 Ast  20 U/L    13-39  

 

 Egfr Non-  99.0    >60  

 

 Egfr   127.4    >60  9









 Laboratory test finding  2018  Amylase  54 U/L      









 Lipase  17 U/L    11.0-82.0  

 

 Creatine Kinase(CK)  30 U/L      

 

 C Reactive Protein  12.86 mg/L  High  < 5.00  10

 

 Magnesium  2.1 mg/dL    1.9-2.7  









 Urinalysis Profile  2018  Urine Color  Yellow      









 Urine Appearance  Cloudy      

 

 Urine Specific Gravity  1.026    1.010-1.030  

 

 Urine pH  7.0    5-9  

 

 Urine Urobilinogen  Negative    Negative  

 

 Urine Ketones  1+    Negative  

 

 Urine Protein  1+(30 mg/dL)    Negative  

 

 Urine Leukocytes  Negative    Negative  

 

 Urine Blood  Negative    Negative  

 

 Urine Nitrite  Negative    Negative  

 

 Urine Bilirubin  Negative    Negative  

 

 Urine Glucose  Negative    Negative  

 

 Urine White Blood Cell  Trace(0-5/hpf)    Absent  

 

 Urine Red Blood Cell  Trace(0-2/hpf)    Absent  

 

 Urine Bacteria  Absent    Absent  

 

 Urine Squamous Epithelial Cell  Present    Absent  









 Laboratory test  2018  Urine Culture And  SEE RESULT BELOW      11



 finding    Sensitivities        

 

 Rapid Influenza A &  2018  Influenza A Molecular  NEGATIVE    Negative  
12



 B Molecular            









 Influenza B Molecular  NEGATIVE    Negative  









 Metabolic Panel (14), Comprehensive  2017  Glucose, Serum  106 mg/dL  
High  65-99  13









 BUN  10 mg/dL    6-24  13

 

 Creatinine, Serum  0.61 mg/dL    0.57-1.00  13

 

 eGFR If NonAfricn Am  108 mL/min/1.73    >59  13

 

 eGFR If Africn Am  124 mL/min/1.73    >59  13

 

 BUN/Creatinine Ratio  16    9-23  13

 

 Sodium, Serum  143 mmol/L    134-144  13

 

 Potassium, Serum  4.2 mmol/L    3.5-5.2  13

 

 Chloride, Serum  103 mmol/L      13

 

 Carbon Dioxide, Total  25 mmol/L    18-29  13

 

 Calcium, Serum  9.2 mg/dL    8.7-10.2  13

 

 Protein, Total, Serum  7.4 g/dL    6.0-8.5  13

 

 Albumin, Serum  4.1 g/dL    3.5-5.5  13

 

 Globulin, Total  3.3 g/dL    1.5-4.5  13

 

 A/G Ratio  1.2    1.2-2.2  13

 

 Bilirubin, Total  <0.2 mg/dL    0.0-1.2  13

 

 Alkaline Phosphatase, S  80 IU/L      13

 

 Ast (Sgot)  21 IU/L    0-40  13

 

 Alt (SGPT)  18 IU/L    0-32  13









 Lipid Panel  2017  Cholesterol, Total  263 mg/dL  High  100-199  13









 Triglycerides  124 mg/dL    0-149  13

 

 HDL Cholesterol  65 mg/dL    >39  13

 

 VLDL Cholesterol David  25 mg/dL    5-40  13

 

 LDL Cholesterol Calc  173 mg/dL  High  0-99  13

 

 Comment:  TNP      13









 CBC With Differential/Platelet  2017  WBC  6.2 x10E3/uL    3.4-10.8  13









 RBC  4.33 x10E6/uL    3.77-5.28  13

 

 Hemoglobin  12.8 g/dL    11.1-15.9  13

 

 Hematocrit  39.0 %    34.0-46.6  13

 

 MCV  90 fL    79-97  13

 

 MCH  29.6 pg    26.6-33.0  13

 

 MCHC  32.8 g/dL    31.5-35.7  13

 

 RDW  14.3 %    12.3-15.4  13

 

 Platelets  402 x10E3/uL  High  150-379  13

 

 Neutrophils  58 %    Not Estab.  13

 

 Lymphs  32 %    Not Estab.  13

 

 Monocytes  8 %    Not Estab.  13

 

 Eos  2 %    Not Estab.  13

 

 Basos  0 %    Not Estab.  13

 

 Immature Cells  TNP      13

 

 Neutrophils (Absolute)  3.6 x10E3/uL    1.4-7.0  13

 

 Lymphs (Absolute)  2.0 x10E3/uL    0.7-3.1  13

 

 Monocytes(Absolute)  0.5 x10E3/uL    0.1-0.9  13

 

 Eos (Absolute)  0.1 x10E3/uL    0.0-0.4  13

 

 Baso (Absolute)  0.0 x10E3/uL    0.0-0.2  13

 

 Immature Granulocytes  0 %    Not Estab.  13

 

 Immature Grans (Abs)  0.0 x10E3/uL    0.0-0.1  13

 

 NRBC  TNP      13

 

 Hematology Comments:  TNP      13









 Laboratory test  2017  Thyroxine (T4)  1.09 ng/dL    0.82-1.77  13



 finding    Free, Direct, S        

 

 Hemoglobin A1c  2017  Hemoglobin A1c  5.6 %    4.8-5.6  13, 14

 

 Laboratory test  2017  TSH  1.400 uIU/mL    0.450-4.500  13



 finding            

 

 Laboratory test  2017  Glucose Serum  106  High    



 finding            

 

 CBC Auto Diff  10/19/2017  White Blood Count  10.2 10^3/uL    3.5-10.8  









 Red Blood Count  4.29 10^6/uL    4.0-5.4  

 

 Hemoglobin  12.9 g/dL    12.0-16.0  

 

 Hematocrit  38 %    35-47  

 

 Mean Corpuscular Volume  89 fL    80-97  

 

 Mean Corpuscular Hemoglobin  30 pg    27-31  

 

 Mean Corpuscular HGB Conc  34 g/dL    31-36  

 

 Red Cell Distribution Width  14 %    10.5-15  

 

 Platelet Count  414 10^3/uL    150-450  

 

 Mean Platelet Volume  7 um3  Low  7.4-10.4  

 

 Abs Neutrophils  7.5 10^3/uL    1.5-7.7  

 

 Abs Lymphocytes  1.8 10^3/uL    1.0-4.8  

 

 Abs Monocytes  0.9 10^3/uL  High  0-0.8  

 

 Abs Eosinophils  0 10^3/uL    0-0.6  

 

 Abs Basophils  0.1 10^3/uL    0-0.2  

 

 Abs Nucleated RBC  0 10^3/uL      

 

 Granulocyte %  73.2 %    38-83  

 

 Lymphocyte %  17.5 %  Low  25-47  

 

 Monocyte %  8.4 %    1-9  

 

 Eosinophil %  0.2 %    0-6  

 

 Basophil %  0.7 %    0-2  

 

 Nucleated Red Blood Cells %  0      









 Laboratory test finding  10/19/2017  B-Type Natriuretic Peptide BNP  47 pg/mL 
     15









 Lactic Acid  1.3 mmol/L    0.5-2.0  16









 Comp Metabolic Panel  10/19/2017  Sodium  136 mmol/L    133-145  









 Potassium  3.5 mmol/L    3.5-5.0  

 

 Chloride  103 mmol/L    101-111  

 

 Co2 Carbon Dioxide  26 mmol/L    22-32  

 

 Anion Gap  7 mmol/L    2-11  

 

 Glucose  91 mg/dL      

 

 Blood Urea Nitrogen  15 mg/dL    6-24  

 

 Creatinine  0.62 mg/dL    0.51-0.95  

 

 BUN/Creatinine Ratio  24.2  High  8-20  

 

 Calcium  9.2 mg/dL    8.6-10.3  

 

 Total Protein  7.3 g/dL    6.4-8.9  

 

 Albumin  3.8 g/dL    3.2-5.2  

 

 Globulin  3.5 g/dL    2-4  

 

 Albumin/Globulin Ratio  1.1    1-3  

 

 Total Bilirubin  0.30 mg/dL    0.2-1.0  

 

 Alkaline Phosphatase  63 U/L      

 

 Alt  21 U/L    7-52  

 

 Ast  15 U/L    13-39  

 

 Egfr Non-  102.7    >60  

 

 Egfr   132.1    >60  17









 Laboratory test finding  10/19/2017  Magnesium  2.1 mg/dL    1.9-2.7  









 Creatine Kinase(CK)  25 U/L      

 

 Troponin I  0.00 ng/mL    <0.04  









 CKMB  10/19/2017  CKMB  ng/mL  0.7 ng/mL    0.6-6.3  

 

 Laboratory test finding  10/19/2017  TSH (Thyroid Stim Horm)  3.10 mcIU/mL    
0.34-5.60  









 Blood Culture  SEE RESULT BELOW      18









 CBC No Diff  10/03/2017  White Blood Count  17.4 10^3/uL  High  3.5-10.8  









 Red Blood Count  4.29 10^6/uL    4.0-5.4  

 

 Hemoglobin  13.0 g/dL    12.0-16.0  

 

 Hematocrit  39 %    35-47  

 

 Mean Corpuscular Volume  90 fL    80-97  

 

 Mean Corpuscular Hemoglobin  30 pg    27-31  

 

 Mean Corpuscular HGB Conc  34 g/dL    31-36  

 

 Red Cell Distribution Width  14 %    10.5-15  

 

 Platelet Count  383 10^3/uL    150-450  

 

 Mean Platelet Volume  8 um3    7.4-10.4  









 Urinalysis Profile  10/03/2017  Urine Color  Helen      









 Urine Appearance  Turbid      

 

 Urine Specific Gravity  1.028    1.010-1.030  

 

 Urine pH  5.0    5-9  

 

 Urine Urobilinogen  Negative    Negative  

 

 Urine Ketones  Trace    Negative  

 

 Urine Protein  2+(100 mg/dL)    Negative  

 

 Urine Leukocytes  Negative    Negative  

 

 Urine Blood  Negative    Negative  

 

 Urine Nitrite  Negative    Negative  

 

 Urine Bilirubin  Negative    Negative  

 

 Urine Glucose  Negative    Negative  

 

 Urine White Blood Cell  2+(11-20/hpf)    Absent  

 

 Urine Red Blood Cell  3+(>10/hpf)    Absent  

 

 Urine Bacteria  3+    Absent  

 

 Urine Squamous Epithelial Cell  Present    Absent  

 

 Urine Yeast  Present    Absent  









 Comp Metabolic Panel  10/03/2017  Sodium  138 mmol/L    133-145  









 Potassium  4.3 mmol/L    3.5-5.0  

 

 Chloride  104 mmol/L    101-111  

 

 Co2 Carbon Dioxide  27 mmol/L    22-32  

 

 Anion Gap  7 mmol/L    2-11  

 

 Glucose  81 mg/dL      

 

 Blood Urea Nitrogen  11 mg/dL    6-24  

 

 Creatinine  0.64 mg/dL    0.51-0.95  

 

 BUN/Creatinine Ratio  17.2    8-20  

 

 Calcium  9.7 mg/dL    8.6-10.3  

 

 Total Protein  7.2 g/dL    6.4-8.9  

 

 Albumin  3.9 g/dL    3.2-5.2  

 

 Globulin  3.3 g/dL    2-4  

 

 Albumin/Globulin Ratio  1.2    1-3  

 

 Total Bilirubin  0.20 mg/dL    0.2-1.0  

 

 Alkaline Phosphatase  63 U/L      

 

 Alt  15 U/L    7-52  

 

 Ast  15 U/L    13-39  

 

 Egfr Non-  99.0    >60  

 

 Egfr   127.4    >60  19









 Lipid Profile (Trig/Chol/HDL)  10/03/2017  Triglycerides  60 mg/dL      20









 Cholesterol  228 mg/dL      21

 

 HDL Cholesterol  67.9 mg/dL      22

 

 LDL Cholesterol  148 mg/dL      23









 Laboratory test finding  10/03/2017  Thyroxine  10.33 g/mL    6.09-12.23  









 TSH (Thyroid Stim Horm)  0.75 mcIU/mL    0.34-5.60  









 Urine Microalbumin Random  10/03/2017  Ur Microalbumin (mg/L)  82.3 mg/L      









 Urine Creatinine  361.76 mg/dL      

 

 Urine Microalbumin/Creatinine  22.7 ug/mg    <31  









 Laboratory test finding  10/03/2017  Hemoglobin A1c (Glyco  5.8 %    Less than 
6.0  24



     HGB)        









 Urine Culture And Sensitivities  SEE RESULT BELOW      25









 Ua - Non Micro (Fma)  2017  Appearance  clear      









 Color  yellow      

 

 Glucose, Urine (Fma/CMC/CTX)  neg      

 

 Bilirubin  neg      

 

 Ketones  neg      

 

 SP Grav  >=1.030      

 

 Blood  neg      

 

 PH  5.5      

 

 Protein  neg      

 

 Urobil  0.2      

 

 Nitrite  neg      

 

 Leukocytes (Fma/CMC/Centrex)  neg      









 Urine Culture Routine  2017  Urine Culture, Routine  Final report      26
, 27









 Result 1  Escherichia coli      26, 28

 

 Antimicrobial Susceptibility  See Comment:      26, 29









 Laboratory test finding  2017  Acetaminophen  < 15 g/mL      30

 

 CBC Auto Diff  2017  White Blood Count  8.7 10^3/uL    3.5-10.8  









 Red Blood Count  4.46 10^6/uL    4.0-5.4  

 

 Hemoglobin  13.3 g/dL    12.0-16.0  

 

 Hematocrit  40 %    35-47  

 

 Mean Corpuscular Volume  90 fL    80-97  

 

 Mean Corpuscular Hemoglobin  30 pg    27-31  

 

 Mean Corpuscular HGB Conc  34 g/dL    31-36  

 

 Red Cell Distribution Width  14 %    10.5-15  

 

 Platelet Count  342 10^3/uL    150-450  

 

 Mean Platelet Volume  7 um3  Low  7.4-10.4  

 

 Abs Neutrophils  5.8 10^3/uL    1.5-7.7  

 

 Abs Lymphocytes  2.1 10^3/uL    1.0-4.8  

 

 Abs Monocytes  0.7 10^3/uL    0-0.8  

 

 Abs Eosinophils  0.1 10^3/uL    0-0.6  

 

 Abs Basophils  0.1 10^3/uL    0-0.2  

 

 Abs Nucleated RBC  0 10^3/uL      

 

 Granulocyte %  66.8 %    38-83  

 

 Lymphocyte %  24.0 %  Low  25-47  

 

 Monocyte %  7.9 %    1-9  

 

 Eosinophil %  0.7 %    0-6  

 

 Basophil %  0.6 %    0-2  

 

 Nucleated Red Blood Cells %  0      









 Laboratory test finding  2017  Lactic Acid  1.1 mmol/L    0.5-2.0  31

 

 Comp Metabolic Panel  2017  Sodium  135 mmol/L    133-145  









 Potassium  3.7 mmol/L    3.5-5.0  

 

 Chloride  99 mmol/L  Low  101-111  

 

 Co2 Carbon Dioxide  26 mmol/L    22-32  

 

 Anion Gap  10 mmol/L    2-11  

 

 Glucose  126 mg/dL  High    

 

 Blood Urea Nitrogen  13 mg/dL    6-24  

 

 Creatinine  0.74 mg/dL    0.51-0.95  

 

 BUN/Creatinine Ratio  17.6    8-20  

 

 Calcium  9.3 mg/dL    8.6-10.3  

 

 Total Protein  7.9 g/dL    6.4-8.9  

 

 Albumin  4.1 g/dL    3.2-5.2  

 

 Globulin  3.8 g/dL    2-4  

 

 Albumin/Globulin Ratio  1.1    1-3  

 

 Total Bilirubin  0.40 mg/dL    0.2-1.0  

 

 Alkaline Phosphatase  71 U/L      

 

 Alt  16 U/L    7-52  

 

 Ast  20 U/L    13-39  

 

 Egfr Non-  83.8    >60  

 

 Egfr   107.7    >60  32









 Laboratory test finding  2017  Acetaminophen  < 15 g/mL      33









 Alcohol  < 10 mg/dL    <10  

 

 Salicylate  < 2.50 mg/dL    <30  









 CBC Auto Diff  03/10/2017  White Blood Count  5.3 10^3/uL    3.5-10.8  









 Red Blood Count  4.55 10^6/uL    4.0-5.4  

 

 Hemoglobin  13.5 g/dL    12.0-16.0  

 

 Hematocrit  41 %    35-47  

 

 Mean Corpuscular Volume  90 fL    80-97  

 

 Mean Corpuscular Hemoglobin  30 pg    27-31  

 

 Mean Corpuscular HGB Conc  33 g/dL    31-36  

 

 Red Cell Distribution Width  14 %    10.5-15  

 

 Platelet Count  349 10^3/uL    150-450  

 

 Mean Platelet Volume  8 um3    7.4-10.4  

 

 Abs Neutrophils  3.2 10^3/uL    1.5-7.7  

 

 Abs Lymphocytes  1.5 10^3/uL    1.0-4.8  

 

 Abs Monocytes  0.5 10^3/uL    0-0.8  

 

 Abs Eosinophils  0.1 10^3/uL    0-0.6  

 

 Abs Basophils  0 10^3/uL    0-0.2  

 

 Abs Nucleated RBC  0 10^3/uL      

 

 Granulocyte %  59.6 %    38-83  

 

 Lymphocyte %  28.0 %    25-47  

 

 Monocyte %  10.2 %  High  1-9  

 

 Eosinophil %  1.7 %    0-6  

 

 Basophil %  0.5 %    0-2  

 

 Nucleated Red Blood Cells %  0      









 Comp Metabolic Panel  03/10/2017  Sodium  138 mmol/L    133-145  









 Potassium  4.1 mmol/L    3.5-5.0  

 

 Chloride  102 mmol/L    101-111  

 

 Co2 Carbon Dioxide  29 mmol/L    22-32  

 

 Anion Gap  7 mmol/L    2-11  

 

 Glucose  83 mg/dL      

 

 Blood Urea Nitrogen  14 mg/dL    6-24  

 

 Creatinine  0.66 mg/dL    0.51-0.95  

 

 BUN/Creatinine Ratio  21.2  High  8-20  

 

 Calcium  9.2 mg/dL    8.6-10.3  

 

 Total Protein  6.8 g/dL    6.4-8.9  

 

 Albumin  3.9 g/dL    3.2-5.2  

 

 Globulin  2.9 g/dL    2-4  

 

 Albumin/Globulin Ratio  1.3    1-3  

 

 Total Bilirubin  0.50 mg/dL    0.2-1.0  

 

 Alkaline Phosphatase  61 U/L      

 

 Alt  22 U/L    7-52  

 

 Ast  15 U/L    13-39  

 

 Egfr Non-  96.0    >60  

 

 Egfr   123.5    >60  34









 Laboratory test finding  03/10/2017  Magnesium  2.0 mg/dL    1.9-2.7  35









 Creatine Kinase(CK)  31 U/L      36

 

 TSH (Thyroid Stim Horm)  1.86 mcIU/mL    0.34-5.60  37

 

 Thyroperoxidase AB  608.12 IU/mL  High  <9  38

 

 Folic Acid (Folate)  13.65 ng/mL    >3.99  39

 

 Vitamin B12  386 pg/mL    180-914  40

 

 Vitamin D Total 25(Oh)  20.0 ng/mL  Low  30-50  41

 

 Angiotensin Converting Enzyme  20 U/L    8 - 53  42

 

 Thyroglobulin AB  40 IU/mL    <4.0  43









 Anca AB Ser If  03/10/2017  C-Anca  Negative    Negative  









 P-Anca  Negative    Negative  44









 Connective Tissue Panel  03/10/2017  Anti-Nuclear Antibody  0.6 U      45









 Cyclic Citrullinated Peptide  <15.6 U      46

 

 Interpretation  See Comment      47









 Laboratory test finding  03/10/2017  Rheumatoid Factor  <15 IU/mL    <15  48

 

 Hla B27  03/10/2017  Hla B27  Negative      49









 Hla B27 Interp  See Comment      50









 Laboratory test finding  03/10/2017  Vitamin D, 1,25 Dihydroxy  78 pg/mL    18-
78  51

 

 Laboratory test finding  2017  Potassium Redraw  3.7 mmol/L    3.5-5.0  









 Ast Redraw  22 U/L    13-39  









 Comp Metabolic Panel  2017  Sodium  134 mmol/L    133-145  









 Chloride  103 mmol/L    101-111  

 

 Co2 Carbon Dioxide  24 mmol/L    22-32  

 

 Glucose  131 mg/dL  High    

 

 Blood Urea Nitrogen  9 mg/dL    6-24  

 

 Creatinine  0.65 mg/dL    0.51-0.95  

 

 BUN/Creatinine Ratio  13.8    8-20  

 

 Calcium  9.4 mg/dL    8.6-10.3  

 

 Total Protein  7.6 g/dL    6.4-8.9  

 

 Albumin  4.0 g/dL    3.2-5.2  

 

 Globulin  3.6 g/dL    2-4  

 

 Albumin/Globulin Ratio  1.1    1-3  

 

 Total Bilirubin  0.50 mg/dL    0.2-1.0  

 

 Alkaline Phosphatase  66 U/L      

 

 Alt  21 U/L    7-52  

 

 Egfr Non-  97.7    >60  

 

 Egfr   125.7    >60  52

 

 Ast  22 U/L    13-39  

 

 Potassium  3.6 mmol/L    3.5-5.0  

 

 Anion Gap  7 mmol/L    2-11  









 Laboratory test finding  2017  Magnesium  1.9 mg/dL    1.9-2.7  









 Troponin I  0.00 ng/mL    <0.04  53









 CBC Auto Diff  2017  White Blood Count  6.7 10^3/uL    3.5-10.8  









 Red Blood Count  4.66 10^6/uL    4.0-5.4  

 

 Hemoglobin  14.0 g/dL    12.0-16.0  

 

 Hematocrit  42 %    35-47  

 

 Mean Corpuscular Volume  89 fL    80-97  

 

 Mean Corpuscular Hemoglobin  30 pg    27-31  

 

 Mean Corpuscular HGB Conc  34 g/dL    31-36  

 

 Red Cell Distribution Width  14 %    10.5-15  

 

 Platelet Count  389 10^3/uL    150-450  

 

 Mean Platelet Volume  7 um3  Low  7.4-10.4  

 

 Abs Neutrophils  5.0 10^3/uL    1.5-7.7  

 

 Abs Lymphocytes  0.7 10^3/uL  Low  1.0-4.8  

 

 Abs Monocytes  0.9 10^3/uL  High  0-0.8  

 

 Abs Eosinophils  0.1 10^3/uL    0-0.6  

 

 Abs Basophils  0 10^3/uL    0-0.2  

 

 Abs Nucleated RBC  0 10^3/uL      

 

 Granulocyte %  75.5 %    38-83  

 

 Lymphocyte %  10.1 %  Low  25-47  

 

 Monocyte %  12.8 %  High  1-9  

 

 Eosinophil %  1.2 %    0-6  

 

 Basophil %  0.4 %    0-2  

 

 Nucleated Red Blood Cells %  0      









 CBC Auto Diff  2017  White Blood Count  7.9 10^3/uL    3.5-10.8  









 Red Blood Count  4.36 10^6/uL    4.0-5.4  

 

 Hemoglobin  13.2 g/dL    12.0-16.0  

 

 Hematocrit  39 %    35-47  

 

 Mean Corpuscular Volume  89 fL    80-97  

 

 Mean Corpuscular Hemoglobin  30 pg    27-31  

 

 Mean Corpuscular HGB Conc  34 g/dL    31-36  

 

 Red Cell Distribution Width  14 %    10.5-15  

 

 Platelet Count  320 10^3/uL    150-450  

 

 Mean Platelet Volume  7 um3  Low  7.4-10.4  

 

 Abs Neutrophils  5.3 10^3/uL    1.5-7.7  

 

 Abs Lymphocytes  1.7 10^3/uL    1.0-4.8  

 

 Abs Monocytes  0.7 10^3/uL    0-0.8  

 

 Abs Eosinophils  0.1 10^3/uL    0-0.6  

 

 Abs Basophils  0.1 10^3/uL    0-0.2  

 

 Abs Nucleated RBC  0 10^3/uL      

 

 Granulocyte %  66.9 %    38-83  

 

 Lymphocyte %  22.2 %  Low  25-47  

 

 Monocyte %  9.0 %    1-9  

 

 Eosinophil %  1.1 %    0-6  

 

 Basophil %  0.8 %    0-2  

 

 Nucleated Red Blood Cells %  0      









 Laboratory test finding  2017  Lactic Acid  1.2 mmol/L    0.5-2.0  54

 

 Comp Metabolic Panel  2017  Sodium  135 mmol/L    133-145  









 Chloride  103 mmol/L    101-111  

 

 Co2 Carbon Dioxide  26 mmol/L    22-32  

 

 Glucose  96 mg/dL      

 

 Blood Urea Nitrogen  12 mg/dL    6-24  

 

 Creatinine  0.67 mg/dL    0.51-0.95  

 

 BUN/Creatinine Ratio  17.9    8-20  

 

 Calcium  9.3 mg/dL    8.6-10.3  

 

 Total Protein  7.4 g/dL    6.4-8.9  

 

 Albumin  3.9 g/dL    3.2-5.2  

 

 Globulin  3.5 g/dL    2-4  

 

 Albumin/Globulin Ratio  1.1    1-3  

 

 Total Bilirubin  0.50 mg/dL    0.2-1.0  

 

 Alkaline Phosphatase  56 U/L      

 

 Alt  20 U/L    7-52  

 

 Egfr Non-  94.3    >60  

 

 Egfr   121.3    >60  55

 

 Potassium  4.0 mmol/L    3.5-5.0  

 

 Anion Gap  6 mmol/L    2-11  

 

 Ast  23 U/L    13-39  









 Laboratory test finding  2017  Troponin I  0.01 ng/mL    <0.04  56

 

 Laboratory test finding  10/20/2016  Hemoglobin A1c (Fma)  5.4 %    4.1-5.7  









 Microalb, Random (Fma/CMC/CTX)  21.1 mg/L    0.5-37  









 Ua - Non Micro (Fma)  10/20/2016  Appearance  yellow      









 Color  clear      

 

 Glucose, Urine (Fma/CMC/CTX)  neg      

 

 Bilirubin  neg      

 

 Ketones  trace      

 

 SP Grav  1.015      

 

 Blood  neg      

 

 PH  7.5      

 

 Protein  neg      

 

 Urobil  0.2      

 

 Nitrite  neg      

 

 Leukocytes (Fma/CMC/Centrex)  neg      









 Comprehensive Metabolic Prof  10/20/2016  Sodium  139 mEq/L    134-149  









 Potassium  4.1 mEq/L    3.6-5.5  

 

 Chloride  98 mEq/L      

 

 Carbon Dioxide  26 mEq/L    21-32  

 

 Glucose  90 mg/dL      

 

 BUN  13 mg/dL    6-26  

 

 Creatinine  0.7 mg/dL    0.6-1.4  

 

 BUN/Creat Ratio  18.6 CALC    8.0-36.0  

 

 Calcium  9.1 mg/dL    8.6-10.2  

 

 Total Protein  7.2 g/dL    6.4-8.3  

 

 Albumin  4.1 g/dL    3.8-5.5  

 

 Globulin  3.1 g/dL    2.0-4.8  

 

 A/G Ratio  1.3 CALC    0.6-2.3  

 

 Alk. Phosphatase  62 U/L      

 

 Alt (SGPT)  24 U/L    7-35  

 

 Ast (Sgot)  17 U/L    5-34  

 

 Total Bilirubin  0.3 mg/dL    0.2-1.3  

 

 GFR Non-African American  >60 ml/min/1.73m^    >=60  

 

 GFR African American  >60 ml/min/1.73m^    >=60  









 Lipid Profile  10/20/2016  Cholesterol  218 mg/dL  High  120-200  









 Triglycerides  46 mg/dL      

 

 HDL Cholesterol  70 mg/dL    30-85  

 

 LDL (Calculated)  139 CALC  High  0-129  

 

 VLDL Cholesterol  9 mg/dL    0-50  

 

 HDL Risk Factor  3.1 CALC    0.0-4.4  









 Complete Blood Count  10/20/2016  WBC  4.5 x10^3/UL    3.6-9.6  









 RBC  4.35 x10^6/UL    3.90-5.70  

 

 HGB  13.5 g/dL    12.1-17.2  

 

 HCT  40 %    36-50  

 

 MCV  92.0 fL    82.2-97.4  

 

 MCH  31.0 pg    27.6-33.3  

 

 MCHC  33.7 g/dL    33.0-35.5  

 

 RDW  13.9 %  High  11.6-13.7  

 

 PLT  350 x10^3/UL    150-400  

 

 MPV  6.0 fL  Low  7.4-10.4  

 

 Gran #  2.7 x10^3/UL    1.5-7.2  

 

 Lymph#  1.6 x10^3/UL    0.7-4.9  

 

 Mono#  0.2 x10^3/UL    0.1-0.9  

 

 Gran %  58.3 %    42.2-75.2  

 

 Lymph %  37.1 %    20.5-51.1  

 

 Mono%  4.6 %    1.7-9.3  









 Laboratory test finding  10/20/2016  TSH  2.15 mIU/L    0.50-6.00  57

 

 Laboratory test finding  10/08/2016  Troponin I  0.00 ng/mL    <0.03  58









 HCG Pregnancy  < 0.60 mIU/mL      59









 Comp Metabolic Panel  10/08/2016  Sodium  138 mmol/L    133-145  









 Potassium  3.1 mmol/L  Low  3.5-5.0  

 

 Chloride  105 mmol/L    101-111  

 

 Co2 Carbon Dioxide  24 mmol/L    22-32  

 

 Anion Gap  9 mmol/L    2-11  

 

 Glucose  123 mg/dL  High    

 

 Blood Urea Nitrogen  15 mg/dL    6-24  

 

 Creatinine  0.83 mg/dL    0.51-0.95  

 

 BUN/Creatinine Ratio  18.1    8-20  

 

 Calcium  9.5 mg/dL    8.6-10.3  

 

 Total Protein  7.2 g/dL    6.4-8.9  

 

 Albumin  3.8 g/dL    3.2-5.2  

 

 Globulin  3.4 g/dL    2-4  

 

 Albumin/Globulin Ratio  1.1    1-3  

 

 Total Bilirubin  0.40 mg/dL    0.2-1.0  

 

 Alkaline Phosphatase  56 U/L      

 

 Alt  16 U/L    7-52  

 

 Ast  17 U/L    13-39  

 

 Egfr Non-  73.7    >60  

 

 Egfr   94.8    >60  60









 Laboratory test finding  10/08/2016  Lactic Acid  1.6 mmol/L    0.5-2.0  61

 

 CBC Auto Diff  10/08/2016  White Blood Count  7.5 10^3/uL    3.5-10.8  









 Red Blood Count  4.29 10^6/uL    4.0-5.4  

 

 Hemoglobin  12.8 g/dL    12.0-16.0  

 

 Hematocrit  38 %    35-47  

 

 Mean Corpuscular Volume  89 fL    80-97  

 

 Mean Corpuscular Hemoglobin  30 pg    27-31  

 

 Mean Corpuscular HGB Conc  34 g/dL    31-36  

 

 Red Cell Distribution Width  13 %    10.5-15  

 

 Platelet Count  354 10^3/uL    150-450  

 

 Mean Platelet Volume  7 um3  Low  7.4-10.4  

 

 Abs Neutrophils  5.3 10^3/uL    1.5-7.7  

 

 Abs Lymphocytes  1.4 10^3/uL    1.0-4.8  

 

 Abs Monocytes  0.7 10^3/uL    0-0.8  

 

 Abs Eosinophils  0.1 10^3/uL    0-0.6  

 

 Abs Basophils  0 10^3/uL    0-0.2  

 

 Abs Nucleated RBC  0 10^3/uL      

 

 Granulocyte %  70.5 %    38-83  

 

 Lymphocyte %  18.4 %  Low  25-47  

 

 Monocyte %  9.8 %  High  1-9  

 

 Eosinophil %  0.8 %    0-6  

 

 Basophil %  0.5 %    0-2  

 

 Nucleated Red Blood Cells %  0      









 Laboratory test finding  2016  HIV 1&2 AB Self  Nonreactive    
Nonreactive  62



     Referred        

 

 Laboratory test finding  2016  Troponin I  0.00 ng/mL    <0.03  63

 

 Comp Metabolic Panel  2016  Sodium  135 mmol/L    133-145  









 Chloride  102 mmol/L    101-111  

 

 Co2 Carbon Dioxide  25 mmol/L    22-32  

 

 Glucose  139 mg/dL  High    

 

 Blood Urea Nitrogen  14 mg/dL    6-24  

 

 Creatinine  0.64 mg/dL    0.51-0.95  

 

 BUN/Creatinine Ratio  21.9  High  8-20  

 

 Calcium  9.1 mg/dL    8.6-10.3  

 

 Total Protein  7.0 g/dL    6.4-8.9  

 

 Albumin  3.6 g/dL    3.2-5.2  

 

 Globulin  3.4 g/dL    2-4  

 

 Albumin/Globulin Ratio  1.1    1-3  

 

 Total Bilirubin  0.30 mg/dL    0.2-1.0  

 

 Alkaline Phosphatase  54 U/L      

 

 Alt  17 U/L    7-52  

 

 Egfr Non-  99.5    >60  

 

 Egfr   127.9    >60  64

 

 Potassium  3.9 mmol/L    3.5-5.0  

 

 Anion Gap  8 mmol/L    2-11  

 

 Ast  16 U/L    13-39  









 CBC Auto Diff  2016  White Blood Count  8.2 10^3/uL    3.5-10.8  









 Red Blood Count  4.37 10^6/uL    4.0-5.4  

 

 Hemoglobin  13.3 g/dL    12.0-16.0  

 

 Hematocrit  40 %    35-47  

 

 Mean Corpuscular Volume  91 fL    80-97  

 

 Mean Corpuscular Hemoglobin  30 pg    27-31  

 

 Mean Corpuscular HGB Conc  34 g/dL    31-36  

 

 Red Cell Distribution Width  13 %    10.5-15  

 

 Platelet Count  293 10^3/uL    150-450  

 

 Mean Platelet Volume  7 um3  Low  7.4-10.4  

 

 Abs Neutrophils  5.0 10^3/uL    1.5-7.7  

 

 Abs Lymphocytes  2.5 10^3/uL    1.0-4.8  

 

 Abs Monocytes  0.6 10^3/uL    0-0.8  

 

 Abs Eosinophils  0.1 10^3/uL    0-0.6  

 

 Abs Basophils  0.1 10^3/uL    0-0.2  

 

 Abs Nucleated RBC  0 10^3/uL      

 

 Granulocyte %  60.5 %    38-83  

 

 Lymphocyte %  29.8 %    25-47  

 

 Monocyte %  7.8 %    1-9  

 

 Eosinophil %  1.2 %    0-6  

 

 Basophil %  0.7 %    0-2  

 

 Nucleated Red Blood Cells %  0      









 1  NON-FASTING

 

 2  SEE RESULT BELOW



   -----------------------------------------------------------------------------
---------------



   Name:  MARIELOS SEVERINO               : 1970    Attend Dr: Cuca Blancas MD



   Acct:  [id number]  Unit: [id number]  AGE: 47            Location:  [facility]



   Re18                        SEX: F             Status:    DEP ER



   -----------------------------------------------------------------------------
---------------



   



   SPEC: [id number]         JACOB:       [facility] DR: Cuca Blancas MD



   REQ:  [id number]              RECD:   



   STATUS: ONEIL MICHAEL DR: Lin Lima MD



   _



   SOURCE: BLOOD,VENO     SPDESC:



   ORDERED:  Blood Cult, MRSA/SA BC PCR



   



   -----------------------------------------------------------------------------
---------------



   Procedure                         Result                         Reported   
        Site



   -----------------------------------------------------------------------------
---------------



   Aerobic Culture Bottle  Final                                    18-
1601      ML



   No Growth Day 5



   



   Anaerobic Culture Bottle  Final                                  18-
0950      ML



   Anaerobic Btl Gram Stain    Gram Positive Cocci resembling Staph



   



   Organism 1                     STAPHYLOCOCCUS EPIDERMIDIS



   



   - Sensitivity not performed; probable contaminant.



   



   If further testing is required, please call microbiology



   laboratory.



   



   



   MRSA/S. aureus Blood Cult PCR  Final                             18-
2131      ML



   Organism 1                     MRSA NEGATIVE



   Organism 2                     S.AUREUS NEGATIVE



   



   -----------------------------------------------------------------------------
---------------



   * ML - Main Lab



   .



   



   



   



   



   



   



   



   



   



   



   



   



   ** END OF REPORT **



   



   DEPARTMENT OF PATHOLOGY,  [address] [place] [address]



   Phone # [phone]      Fax #[phone]



   David Naik M.D. Director     [facility] # [id number]

 

 3  SEE RESULT BELOW



   -----------------------------------------------------------------------------
---------------



   Name:  MARIELOS SEVERINO               : 1970    Attend Dr: Cuca Blancas MD



   Acct:  [id number]  Unit: [id number]  AGE: 47            Location:  [facility]



   Re18                        SEX: F             Status:    REG ER



   -----------------------------------------------------------------------------
---------------



   



   SPEC: [id number]         JACOB:       [facility] DR: Cuca Blancas MD



   REQ:  [id number]              RECD:   



   STATUS: ONEIL MICHAEL DR: Lin Lima MD



   _



   SOURCE: STOOL          SPDESC:



   ORDERED:  Occult Bl, Diag, C. diff PCR, Fecal Lactoferr



   



   -----------------------------------------------------------------------------
---------------



   Procedure                         Result                         Reported   
        Site



   -----------------------------------------------------------------------------
---------------



   Stool Specimen Description  Final                                18-
1457      ML



   Stool Color                 Brown



   Stool Form                  Nonformed



   Stool Consistency           Mucoid



   



   C. difficile PCR  Final                                          18-
1704      ML



   Organism 1                     027 Presumptive NEGATIVE



   Organism 2                     Toxigenic C.diff NEGATIVE



   



   Fecal Lactoferrin (Stool WBC)  Final                             18-
1524      ML



   Fecal Lactoferrin           Negative by Immunoassay



   



   TEST LIMITATIONS:



   



   Assay detects elevated levels of lactoferrin released from



   fecal leukocytes as a marker of intestinal inflammation. The



   test may not be appropriate in immunocompromised persons.



   Fecal samples from breast fed infants should not be used



   with this assay.



   



   Stool Occult Blood (1)  Final                                    18-
1513      ML



   Stool Occult Blood          Negative



   



   -----------------------------------------------------------------------------
---------------



   * ML - Main Lab



   .



   



   



   



   



   



   



   



   ** END OF REPORT **



   



   DEPARTMENT OF PATHOLOGY,  [address] [place] [address]



   Phone # [phone]      Fax #[phone]



   David Naik M.D. Director     [facility] # [id number]

 

 4  SEE RESULT BELOW



   -----------------------------------------------------------------------------
---------------



   Name:  MARIELOS SEVERINO               : 1970    Attend Dr: Cuca Blancas MD



   Acct:  [id number]  Unit: [id number]  AGE: 47            Location:  [facility]



   Re18                        SEX: F             Status:    DEP ER



   -----------------------------------------------------------------------------
---------------



   



   SPEC: [id number]         JACOB:       [facility] DR: Cuca Blancas MD



   REQ:  [id number]              RECD:   [id number]



   STATUS: ONEIL MICHAEL DR: Lin Lima MD



   _



   SOURCE: STOOL          SPDESC:



   ORDERED:  Stool Culture, O P: Giar/Crypt



   COMMENTS: Unable to Perform Shiga Toxin Testing.  Insufficient



   Growth of Enteric Bacteria.



   



   -----------------------------------------------------------------------------
---------------



   Procedure                         Result                         Reported   
        Site



   -----------------------------------------------------------------------------
---------------



   Stool Culture  Final                                             18-
1017      ML



   



   Result                      No growth of normal enteric purnima



   No enteric pathogens isolated



   



   Testing for Salmonella, Shigella, Aeromonas, Plesiomonas,



   Yersinia and Campylobacter are included in a Stool Culture.



   



   Vibrio spp not routinely tested for in a stool culture.



   If testing is desired, please request specifically when



   placing test order.



   



   Sensitivities not routinely performed on stool isolates, as



   antibiotics may prolong the carriage rate of bacteria.



   Please contact the microbiology lab if sensitivities are



   required.



   



   Stool Specimen Description  Final                                18-
1456      ML



   Stool Color                 Brown



   Stool Form                  Nonformed



   Stool Consistency           Mucoid



   



   Shiga Toxin 1   2  Final                                         18-
1017      ML



   Test not performed



   



   O P: Giardia/Cryptospor Screen  Final                            18-
1131      ML



   



   Organism 1                     Neg Cryptosporidium/Giardia



   



   ** CONTINUED ON NEXT PAGE **



   



   DEPARTMENT OF PATHOLOGY,  [address] [place] [address]



   Phone # [phone]      Fax #[phone]



   David Naik M.D. Director     [facility] # [id number]



   



   



   



   -----------------------------------------------------------------------------
---------------



   Patient: MARIELOS SEVERINO                [id number]     (Continued)



   -----------------------------------------------------------------------------
---------------



   



   



   Specimen: [id number]    Collected:   Received: 
    (Continued)



   



   -----------------------------------------------------------------------------
---------------



   Procedure                         Result                         Reported   
        Site



   -----------------------------------------------------------------------------
---------------



   O P: Giardia/Cryptospor Screen  Final   (continued)              18-




   Giardia and cryptosporidium antigen testing performed by



   enzyme immunoassay.  If patient is immunocompromised or has



   traveled to or is from a developing country, a full ova and



   parasite exam with microscopic (OPMIC) is recommended.  All



   samples will be held one month in case full ova and parasite



   testing is requested.  Contact the Microbiology Department



   at [phone].



   



   TEST LIMITATIONS:



   As with all diagnostic procedures, the results obtained



   should be used in conjunction with other clinical



   information available the physician, including confirmation



   by another method.  Negative results can occur in samples



   containing antigen below lower limits of detection of the



   assay.  One negative specimen does not rule out the



   possibility of a parasitic infection. To improve detection



   it is recommended that three specimens be collected on



   separate days over a period of not more than seven



   days. The use of colonic washes, aspirates or other diluted



   sample types has not been established and could affect the



   performance of the assay. Stool samples contaminated with an



   oily or particulate base (eg. Barium, mineral oil etc.)



   could interfere with the test and are not recommended.



   



   -----------------------------------------------------------------------------
---------------



   * ML - Main Lab



   .



   



   



   



   



   



   



   



   



   



   



   



   ** END OF REPORT **



   



   DEPARTMENT OF PATHOLOGY,  [address] [place] [address]



   Phone # [phone]      Fax #[phone]



   David Naik M.D. Director     [facility] # [id number]

 

 5  SEE RESULT BELOW



   -----------------------------------------------------------------------------
---------------



   Name:  MARIELOS SEVERINO               : 1970    Attend Dr: Cuca Blancas MD



   Acct:  [id number]  Unit: [id number]  AGE: 47            Location:  [facility]



   Re18                        SEX: F             Status:    REG ER



   -----------------------------------------------------------------------------
---------------



   



   SPEC: [id number]         JACOB:       [facility] DR: Cuca Blancas MD



   REQ:  [id number]              RECD:   



   STATUS: RES              [facility] DR: Lin Lima MD



   _



   SOURCE: STOOL          SPDESC:



   ORDERED:  Occult Bl, RANDEE Recio diff PCR, Fecal Lactoferr



   



   -----------------------------------------------------------------------------
---------------



   Procedure                         Result                         Reported   
        Site



   -----------------------------------------------------------------------------
---------------



   Stool Specimen Description  Final                                18-
1457      ML



   Stool Color                 Brown



   Stool Form                  Nonformed



   Stool Consistency           Mucoid



   



   C. difficile PCR



   PENDING



   



   Fecal Lactoferrin (Stool WBC)



   PENDING



   



   Stool Occult Blood (1)



   PENDING



   



   -----------------------------------------------------------------------------
---------------



   * ML - Main Lab



   .



   



   



   



   



   



   



   



   



   



   



   



   



   



   



   



   



   ** END OF REPORT **



   



   DEPARTMENT OF PATHOLOGY,  [address] [place] [address]



   Phone # [phone]      Fax #[phone]



   David Naik M.D. Director     [facility] # [id number]

 

 6  Reference ranges based on room air.

 

 7  : [id number]

 

 8  [facility] Severe Sepsis and Septic Shock Management Bundle Measure



   requires all lactic acids initially measuring >2.0 mmol/L be



   repeated.

 

 9  *******Because ethnic data is not always readily available,



   this report includes an eGFR for both -Americans and



   non- Americans.****



   The National Kidney Disease Education Program (NKDEP) does



   not endorse the use of the MDRD equation for patients that



   are not between the ages of 18 and 70, are pregnant, have



   extremes of body size, muscle mass, or nutritional status,



   or are non- or non-.



   According to the National Kidney Foundation, irrespective of



   diagnosis, the stage of the disease is based on the level of



   kidney function:



   Stage Description                      GFR(mL/min/1.73 m(2))



   1     Kidney damage with normal or decreased GFR       90



   2     Kidney damage with mild decrease in GFR          60-89



   3     Moderate decrease in GFR                         30-59



   4     Severe decrease in GFR                           15-29



   5     Kidney failure                       <15 (or dialysis)

 

 10  Acute inflammation:  >10.00

 

 11  SEE RESULT BELOW



   -----------------------------------------------------------------------------
---------------



   Name:  MARIELOS SEVERINO               : 1970    Attend Dr: Cuca Blancas MD



   Acct:  [id number]  Unit: [id number]  AGE: 47            Location:  [facility]



   Re18                        SEX: F             Status:    DEP ER



   -----------------------------------------------------------------------------
---------------



   



   SPEC: [id number]         JACOB:   18-    [facility] DR: Cuca Blancas MD



   REQ:  [id number]              RECD:   



   STATUS: ONEIL MICHAEL DR: Lin Lima MD



   _



   SOURCE: URINE          SPDESC:



   ORDERED:  Urine Culture



   



   -----------------------------------------------------------------------------
---------------



   Procedure                         Result                         Reported   
        Site



   -----------------------------------------------------------------------------
---------------



   Urine Culture  Final                                             18-
1608      ML



   



   No growth of clinically significant organisms



   



   -----------------------------------------------------------------------------
---------------



   * ML - Main Lab



   .



   



   



   



   



   



   



   



   



   



   



   



   



   



   



   



   



   



   



   



   



   



   



   



   



   



   



   ** END OF REPORT **



   



   DEPARTMENT OF PATHOLOGY,  [address] [place] [address]



   Phone # [phone]      Fax #[phone]



   David Naik M.D. Director     [facility] # [id number]

 

 12  : [id number]

 

 13  2 sst 1 lav

 

 14  Pre-diabetes: 5.7 - 6.4



   Diabetes: >6.4



   Glycemic control for adults with diabetes: <7.0

 

 15  >100 to <200 pg/mL: likely compensated congestive heart



   failure (CHF)



   200 to 400 pg/mL: likely moderate CHF



   >400 pg/mL: likely moderate to severe CHF

 

 16  [facility] Severe Sepsis and Septic Shock Management Bundle Measure



   requires all lactic acids initially measuring >2.0 mmol/L be



   repeated.

 

 17  *******Because ethnic data is not always readily available,



   this report includes an eGFR for both -Americans and



   non- Americans.****



   The National Kidney Disease Education Program (NKDEP) does



   not endorse the use of the MDRD equation for patients that



   are not between the ages of 18 and 70, are pregnant, have



   extremes of body size, muscle mass, or nutritional status,



   or are non- or non-.



   According to the National Kidney Foundation, irrespective of



   diagnosis, the stage of the disease is based on the level of



   kidney function:



   Stage Description                      GFR(mL/min/1.73 m(2))



   1     Kidney damage with normal or decreased GFR       90



   2     Kidney damage with mild decrease in GFR          60-89



   3     Moderate decrease in GFR                         30-59



   4     Severe decrease in GFR                           15-29



   5     Kidney failure                       <15 (or dialysis)

 

 18  SEE RESULT BELOW



   -----------------------------------------------------------------------------
---------------



   Name:  MARIELOS SEVERINO               : 1970    Attend Dr: Felipe Hendricks MD



   Acct:  [id number]  Unit: [id number]  AGE: 47            Location:  [place] [address]



   Reg:   10/19/17      Dis:  10/20/17    SEX: F             Status:    DIS Joshua



   -----------------------------------------------------------------------------
---------------



   



   SPEC: [id number]         JACOB:   10/19/    [facility] DR: Austin Romano MD



   REQ:  [id number]              RECD:   10/19/



   STATUS: ONEIL MICHAEL DR: Lin Lima MD



   _



   SOURCE: BLOOD,VENO     SPDESC:



   ORDERED:  Blood Cult



   



   -----------------------------------------------------------------------------
---------------



   Procedure                         Result                         Reported   
        Site



   -----------------------------------------------------------------------------
---------------



   Aerobic Culture Bottle  Final                                    10/24/17-
4      ML



   No Growth Day 5



   



   Anaerobic Culture Bottle  Final                                  10/24/17-
1714      ML



   No Growth Day 5



   



   -----------------------------------------------------------------------------
---------------



   * ML - MAIN LAB ([facility])



   .



   



   



   



   



   



   



   



   



   



   



   



   



   



   



   



   



   



   



   



   



   



   



   



   



   ** END OF REPORT **



   



   * ML=Testing performed at Main Lab



   DEPARTMENT OF PATHOLOGY,  [address] [place] [address]



   Phone # [phone]      Fax #[phone]



   David Naik M.D. Director     [facility] # [id number]

 

 19  *******Because ethnic data is not always readily available,



   this report includes an eGFR for both -Americans and



   non- Americans.****



   The National Kidney Disease Education Program (NKDEP) does



   not endorse the use of the MDRD equation for patients that



   are not between the ages of 18 and 70, are pregnant, have



   extremes of body size, muscle mass, or nutritional status,



   or are non- or non-.



   According to the National Kidney Foundation, irrespective of



   diagnosis, the stage of the disease is based on the level of



   kidney function:



   Stage Description                      GFR(mL/min/1.73 m(2))



   1     Kidney damage with normal or decreased GFR       90



   2     Kidney damage with mild decrease in GFR          60-89



   3     Moderate decrease in GFR                         30-59



   4     Severe decrease in GFR                           15-29



   5     Kidney failure                       <15 (or dialysis)

 

 20  Desirable <150



   Borderline high 150-199



   High 200-499



   Very High >500

 

 21  Desirable <200



   Borderline high 200-239



   High >239

 

 22  Low <40



   Desirable: 40-60



   High: >60

 

 23  Desirable: <100 mg/dL



   Near Optimal: 100-129 mg/dL



   Borderline High: 130-159 mg/dL



   High: 160-189 mg/dL



   Very High: >189 mg/dL

 

 24  Therapeutic target for the treatment of diabetes



   [facility] patients is <7% HBA1C, and in selective



   patients <6.0%.Please refer to American Diabetes



   Association Diabetic care guidelines for further



   information.

 

 25  SEE RESULT BELOW



   -----------------------------------------------------------------------------
---------------



   Name:  MARIELOS SEVERINO               : 1970    Attend Dr: Lin Lima MD



   Acct:  [id number]  Unit: [id number]  AGE: 47            [facility]



   Reg:   10/03/17                        SEX: F             Status:    REG REF



   -----------------------------------------------------------------------------
---------------



   



   SPEC: [id number]         JACOB:   10/03/    [facility] DR: Lin Lima MD



   REQ:  [id number]              RECD:   10/03/



   STATUS: COMP



   _



   SOURCE: URINE          SPDESC:



   ORDERED:  Urine Culture



   



   -----------------------------------------------------------------------------
---------------



   Procedure                         Result                         Reported   
        Site



   -----------------------------------------------------------------------------
---------------



   Urine Culture  Final                                             10/04/17-
1313      ML



   No Growth (<1,000 CFU/mL)



   



   -----------------------------------------------------------------------------
---------------



   * ML - MAIN LAB ([facility])



   .



   



   



   



   



   



   



   



   



   



   



   



   



   



   



   



   



   



   



   



   



   



   



   



   



   



   



   ** END OF REPORT **



   



   * ML=Testing performed at Main Lab



   DEPARTMENT OF PATHOLOGY,  [address] [place] [address]



   Phone # [phone]      Fax #[phone]



   David Naik M.D. Director     [facility] # [id number]

 

 26  SRC:<Blank> 1urine vacutai ner

 

 27  Source of Specimen: <Blank> 1urine vacutai

 

 28  Escherichia coli



   Source of Specimen: <Blank> 1urine vacutai



   25,000-50,000 colony forming units per mL

 

 29  Source of Specimen: <Blank> 1urine vacutai



   ** S=Susceptible; I=Intermediate; R=Resistant **



   P=Positive; N=Negative



   MICS are expressed in micrograms per mL



   Antibiotic                 RSLT#1    RSLT#2    RSLT#3    RSLT#4



   Amoxicillin/Clavulanic Acid    S



   Ampicillin                     S



   Cefepime                       S



   Ceftriaxone                    S



   Cefuroxime                     S



   Cephalothin                    I



   Ciprofloxacin                  S



   Ertapenem                      S



   Gentamicin                     S



   Imipenem                       S



   Levofloxacin                   S



   Nitrofurantoin                 S



   Piperacillin                   S



   Tetracycline                   S



   Tobramycin                     S



   Trimethoprim/Sulfa             R

 

 30  Therapeutic concentration: <50 ug/mL



   Toxic concentration: >120 ug/mL

 

 31  [place] Severe Sepsis and Septic Shock Management Bundle Measure



   requires all lactic acids initially measuring >2.0 mmol/L be



   repeated.

 

 32  *******Because ethnic data is not always readily available,



   this report includes an eGFR for both -Americans and



   non- Americans.****



   The National Kidney Disease Education Program (NKDEP) does



   not endorse the use of the MDRD equation for patients that



   are not between the ages of 18 and 70, are pregnant, have



   extremes of body size, muscle mass, or nutritional status,



   or are non- or non-.



   According to the National Kidney Foundation, irrespective of



   diagnosis, the stage of the disease is based on the level of



   kidney function:



   Stage Description                      GFR(mL/min/1.73 m(2))



   1     Kidney damage with normal or decreased GFR       90



   2     Kidney damage with mild decrease in GFR          60-89



   3     Moderate decrease in GFR                         30-59



   4     Severe decrease in GFR                           15-29



   5     Kidney failure                       <15 (or dialysis)

 

 33  Therapeutic concentration: <50 ug/mL



   Toxic concentration: >120 ug/mL

 

 34  *******Because ethnic data is not always readily available,



   this report includes an eGFR for both -Americans and



   non- Americans.****



   The National Kidney Disease Education Program (NKDEP) does



   not endorse the use of the MDRD equation for patients that



   are not between the ages of 18 and 70, are pregnant, have



   extremes of body size, muscle mass, or nutritional status,



   or are non- or non-.



   According to the National Kidney Foundation, irrespective of



   diagnosis, the stage of the disease is based on the level of



   kidney function:



   Stage Description                      GFR(mL/min/1.73 m(2))



   1     Kidney damage with normal or decreased GFR       90



   2     Kidney damage with mild decrease in GFR          60-89



   3     Moderate decrease in GFR                         30-59



   4     Severe decrease in GFR                           15-29



   5     Kidney failure                       <15 (or dialysis)

 

 35  Please check this week

 

 36  Please check this week

 

 37  Please check this week

 

 38  Please check this week

 

 39  Please check this week

 

 40  Normal Range 180 to 914



   Indeterminate Range 145 to 180



   Deficient Range  <145

 

 41  Please check this week

 

 42  Test Performed by:



   [facility]



   [address] [place] [address]

 

 43  -------------------ADDITIONAL INFORMATION-------------------



   The thyroglobulin antibody testing method is an



   immunoenzymatic assay manufactured by [employer]



   and performed on the [employer] .



   Values obtained from different assay methods or kits



   may be different and cannot be used interchangeably.



   The results cannot be interpreted as absolute evidence



   for the presence or absence of malignant disease.



   Test Performed by:



   [facility] - [address] [address]

 

 44  Negative for cANCA and pANCA patterns by immunofluorescence.



   -------------------ADDITIONAL INFORMATION-------------------



   This test was developed and its performance characteristics



   determined by [facility] in a manner consistent with CLIA



   requirements. This test has not been cleared or approved by



   the U.S. Food and Drug Administration.



   Test Performed by:



   [facility] - [address] [address]

 

 45  -------------------REFERENCE VALUE--------------------------



   <=1.0 (Negative)

 

 46  -------------------REFERENCE VALUE--------------------------



   <20.0 (Negative)

 

 47  Tests for antibodies to dsDNA and KEY antigens are not



   performed automatically unless the MIGUEL result is > or=



   3.0 U.  Studies performed at [facility] indicate that



   positive MIGUEL results <3.0 U are rarely accompanied by



   positive second order tests.



   Test Performed by:



   [facility] - [address] [address]

 

 48  Test Performed by:



   [facility] - [address] [address]

 

 49  -------------------REFERENCE VALUE--------------------------



   Not Applicable

 

 50  RESULT: HLA-B27 antigen was not detected.



   -------------------ADDITIONAL INFORMATION-------------------



   Method: Flow Cytometry



   Performing Laboratory CLIA# [id number]



   Test Performed by:



   [facility]



   [address] [place] [address]

 

 51  -------------------ADDITIONAL INFORMATION-------------------



   This test was developed and its performance characteristics



   determined by [facility] in a manner consistent with CLIA



   requirements. This test has not been cleared or approved by



   the U.S. Food and Drug Administration.



   Test Performed by:



   [facility]



   [address] [place] [address]

 

 52  *******Because ethnic data is not always readily available,



   this report includes an eGFR for both -Americans and



   non- Americans.****



   The National Kidney Disease Education Program (NKDEP) does



   not endorse the use of the MDRD equation for patients that



   are not between the ages of 18 and 70, are pregnant, have



   extremes of body size, muscle mass, or nutritional status,



   or are non- or non-.



   According to the National Kidney Foundation, irrespective of



   diagnosis, the stage of the disease is based on the level of



   kidney function:



   Stage Description                      GFR(mL/min/1.73 m(2))



   1     Kidney damage with normal or decreased GFR       90



   2     Kidney damage with mild decrease in GFR          60-89



   3     Moderate decrease in GFR                         30-59



   4     Severe decrease in GFR                           15-29



   5     Kidney failure                       <15 (or dialysis)

 

 53  99th percentile=0.04 ng/mL



   



   Troponin results at [facility] and [facility] are not interchangeable.

 

 54  [facility] Severe Sepsis and Septic Shock Management Bundle Measure



   requires all lactic acids initially measuring >2.0 mmol/L be



   repeated.

 

 55  *******Because ethnic data is not always readily available,



   this report includes an eGFR for both -Americans and



   non- Americans.****



   The National Kidney Disease Education Program (NKDEP) does



   not endorse the use of the MDRD equation for patients that



   are not between the ages of 18 and 70, are pregnant, have



   extremes of body size, muscle mass, or nutritional status,



   or are non- or non-.



   According to the National Kidney Foundation, irrespective of



   diagnosis, the stage of the disease is based on the level of



   kidney function:



   Stage Description                      GFR(mL/min/1.73 m(2))



   1     Kidney damage with normal or decreased GFR       90



   2     Kidney damage with mild decrease in GFR          60-89



   3     Moderate decrease in GFR                         30-59



   4     Severe decrease in GFR                           15-29



   5     Kidney failure                       <15 (or dialysis)

 

 56  99th percentile=0.04 ng/mL



   



   Troponin results at [facility] and [facility] are not interchangeable.

 

 57  FASTING

 

 58  Reference Range and Interpretation:



   TnI (ng/mL)             Interpretation



   Less Than 0.03 ng/mL    Not supportive of diagnosis of MI



   0.03 - 0.50 ng/mL       Indeterminate: suggest serial



   studies if clinically indicated.



   Greater than 0.5 ng/mL  Consistent with diagnosis of MI

 

 59  <5.0 Negative



   



   5.0 - 25.0  Indeterminate (Repeat testing recommended after



   72 hours)



   >25.0  Positive



   



   Perimenopausal women can display HCG levels of up to 20



   mIU/mL

 

 60  *******Because ethnic data is not always readily available,



   this report includes an eGFR for both -Americans and



   non- Americans.****



   The National Kidney Disease Education Program (NKDEP) does



   not endorse the use of the MDRD equation for patients that



   are not between the ages of 18 and 70, are pregnant, have



   extremes of body size, muscle mass, or nutritional status,



   or are non- or non-.



   According to the National Kidney Foundation, irrespective of



   diagnosis, the stage of the disease is based on the level of



   kidney function:



   Stage Description                      GFR(mL/min/1.73 m(2))



   1     Kidney damage with normal or decreased GFR       90



   2     Kidney damage with mild decrease in GFR          60-89



   3     Moderate decrease in GFR                         30-59



   4     Severe decrease in GFR                           15-29



   5     Kidney failure                       <15 (or dialysis)

 

 61  [facility] Severe Sepsis and Septic Shock Management Bundle Measure



   requires all lactic acids initially measuring >2.0 mmol/L be



   repeated.

 

 62  It is recognized that currently available assays for the



   detection of antibodies to HIV-1 and/or HIV-2 may not detect



   all infected individuals. HIV antibodies may be undetectable



   in some stages of the infection and in some clinical



   conditions. The performance of this assay has not been



   established for populations of infants or children.



   



   Assayed by Chemiluminescence Microparticle Immunoassay on



   the Siemens Advia Centaur CP. Values obtained with different



   methods or kits cannot be used interchangeably.The



   diagnostic specificity of the ADVIA Centaur 1/O/2 Enhanced



   assay in the low risk population was 99.90% (6052/6058) with



   a 95% confidence interval of 99.78 to 99.96%.

 

 63  Reference Range and Interpretation:



   TnI (ng/mL)             Interpretation



   Less Than 0.03 ng/mL    Not supportive of diagnosis of MI



   0.03 - 0.50 ng/mL       Indeterminate: suggest serial



   studies if clinically indicated.



   Greater than 0.5 ng/mL  Consistent with diagnosis of MI

 

 64  *******Because ethnic data is not always readily available,



   this report includes an eGFR for both -Americans and



   non- Americans.****



   The National Kidney Disease Education Program (NKDEP) does



   not endorse the use of the MDRD equation for patients that



   are not between the ages of 18 and 70, are pregnant, have



   extremes of body size, muscle mass, or nutritional status,



   or are non- or non-.



   According to the National Kidney Foundation, irrespective of



   diagnosis, the stage of the disease is based on the level of



   kidney function:



   Stage Description                      GFR(mL/min/1.73 m(2))



   1     Kidney damage with normal or decreased GFR       90



   2     Kidney damage with mild decrease in GFR          60-89



   3     Moderate decrease in GFR                         30-59



   4     Severe decrease in GFR                           15-29



   5     Kidney failure                       <15 (or dialysis)







Procedures







 Date  CPT Code  Description  Status

 

 2018    Mammogram  Completed

 

 2017  35200  Finger Or Heel Stick  Completed

 

 2016    Mammogram  Completed







Encounters







 Type  Date  Location  Provider  CPT E/M  Dx

 

 Office Visit  2018  4:40p  [facility]  Lin Lima M.D.  [address]42  
E11.9









 F33.1

 

 R19.7









 Office Visit  2018  4:30p  [facility]  Óscar Overton MD  [address]  
R11.0









 R19.7









 Office Visit  2018  3:20p  [facility]  Lin Lima M.D.  [address]87  
E11.9









 F33.1

 

 R19.7









 Office Visit  2018 10:30a  [facility]  Lin Lima M.D.  57698  A09









 E11.9

 

 F33.1









 Office Visit  2018 10:30a  [facility]  Vladimir Diego  [address]25  
J06.9









 J45.901









 Office Visit  2018 10:30a  [facility]  Lin Lima M.D.  [address]277  
M79.671









 M54.5

 

 M79.7









 Office Visit  2017  3:00p  [facility]  Lin Lima M.D.  35902  
Z00.00









 E11.9

 

 F33.1

 

 I10

 

 E78.5

 

 Z12.31

 

 M54.5









 Office Visit  2017  6:00p  [facility]  [place]  [address]  
R11.2









 R19.7

 

 E11.9









 Office Visit  10/23/2017  7:20p  [facility]  Lin Lima M.D.  83090  F33.1









 E11.9

 

 I10

 

 E78.5

 

 R07.9









 Office Visit  10/02/2017  2:00p  [facility]  Lin Lima M.D.  [address]60  F33.1









 M54.5

 

 E11.9

 

 E04.2

 

 M25.549









 Office Visit  2017 11:00a  [facility]  Óscar Riojas M.D.  [address]657  
N39.0

 

 Office Visit  2017  7:40p  [facility]  Lin Lima M.D.  76200  F33.1









 T14.91









 Office Visit  2017  4:40p  [facility]  Lin Lima M.D.  [address]80  
M54.5









 E11.9

 

 F33.1

 

 E04.2









 Office Visit  2017  8:00a  [facility]  Lin Lima M.D.  [address]13  R42









 J06.9

 

 R11.10









 Office Visit  2017 10:50a  [facility]  Lin Lima M.D.  [address]908  
L94.9









 I10

 

 M25.549









 Office Visit  2016 10:30a  [facility]  Lin Lima M.D.  [address]896  
I10









 F33.1

 

 E11.9

 

 M54.5









 Office Visit  10/17/2016  3:00p  [facility]  ROSALINDA Alejandro  [address]  J06.9

 

 Office Visit  2016  2:10p  [facility]  Lin Lima M.D.  [address]  
E11.9









 F33.1

 

 M54.5

 

 I10









 Office Visit  2016  3:45p  [facility]  Anna Caemron, [facility]  [address]  
S62.606A









 W10.8xxA









 Office Visit  2016 10:00a  [facility]  Adriana Porter, [facility]  
[address]  E11.9









 F32.9

 

 M54.5







Plan of Care

2018 - Lin Lima M.D.E11.9 Type 2 diabetes mellitus without 
complicationsNew Labs:Hemoglobin A1c (Fma)CBC Electronic (a)CCS-Comp And 
Lipid (a)T4 &amp; TSHComments:Recommend yearly diabetic eye and foot exams, 
and check on blood pressure periodically. Goal blood sugar is less than 140 in 
the morning or A1c less than 7.   Recommend monitoring portion size, decreased 
carbohydrate intake (breads, pasta, rice, candy, desserts, and sweetened 
beverages/alcohol) and routine daily exercise.Follow up:taxnbycaM25.1 Major 
depressive disorder, recurrent, moderateComments:following psych  ; diarrhea 
may be coming from anxietyAllNew Medication:Naproxen 500 mgMometasone Furoate 
0.1 %Comments:~B_~U_Medication Management~b_~u_ Patient Understands medications 
she's taking?     Yes    No  Are there Barriers to Adherence?    Yes    No  Has 
the patient been asked about herbal supplements and therapies, and OTC meds?   
   Yes    No

## 2018-07-13 NOTE — RAD
INDICATION: RIGHT hand fourth and fifth digit pain post fall.



COMPARISON: March 10, 2017 radiographs.



TECHNIQUE: AP and lateral hand views RIGHT hand.



REPORT AND IMPRESSION: 

#.   Negative for fracture or dislocation. Mild soft tissue swelling most prominent over

the dorsum of the hand at the level of the metacarpal phalangeal joints.

## 2018-07-13 NOTE — ED
Head Injury





- HPI Summary


HPI Summary: 


Pt. is a 48 y.o female who presents to the ER for a head and hand injury after 

a fall that occurred just prior to arrival. Pt. states she opened her house 

door and her dog tried to run out. She tried to grab him but fell, striking her 

head on cement. She denies LOC. No other injuries were sustained. Pt. unaware 

of last tetanus immunization. Pt. complaining of a 9/10 headache. Symptoms are 

moderate in severity. Movement makes symptoms worse. Nothing makes symptoms 

better. 








- History Of Current Complaint


Stated Complaint: FALL/HEAD LAC


Time Seen by Provider: 07/13/18 16:07


Hx Obtained From: Patient





- Allergies/Home Medications


Allergies/Adverse Reactions: 


 Allergies











Allergy/AdvReac Type Severity Reaction Status Date / Time


 


ibuprofen Allergy Intermediate Abdominal Verified 07/13/18 16:59





   Pain  














PMH/Surg Hx/FS Hx/Imm Hx


Previously Healthy: Yes


Endocrine/Hematology History: Reports: Hx Diabetes, Hx Thyroid Disease - hyper, 

Other Endocrine/Hematological Disorders - hyper thyroid


   Denies: Hx Anemia


Cardiovascular History: Reports: Hx Hypertension


   Denies: Hx Congestive Heart Failure, Hx Pacemaker/ICD


Respiratory History: Reports: Hx Asthma


   Denies: Hx Chronic Obstructive Pulmonary Disease (COPD)


GI History: Reports: Other GI Disorders - patient states chronic nausea


   Denies: Hx Jaundice


 History: 


   Denies: Hx Dialysis, Hx Renal Disease


Musculoskeletal History: Reports: Hx Arthritis, Hx Back Problems, Hx 

Fibromyalgia


   Comment Only: Other Musculoskeletal History - fibromyalgia


Sensory History: Reports: Hx Contacts or Glasses


   Denies: Hx Hearing Aid


Opthamlomology History: Reports: Hx Contacts or Glasses


Neurological History: Reports: Hx Migraine, Hx Nerve Disease - fibromyalgia, 

Other Neuro Impairments/Disorders - vertigo


Psychiatric History: Reports: Hx Anxiety, Hx Depression, Hx Panic Disorder - 

ANXIETY, Hx Suicide Attempt


   Denies: Hx Eating Disorder





- Cancer History


Hx Chemotherapy: No


Hx Radiation Therapy: No





- Surgical History


Surgery Procedure, Year, and Place: PARTIAL HYSTERECTOMY 3/2000, TUBAL 1997, 

GALLBLADDER 1995





- Immunization History


Date of Tetanus Vaccine: Unk


Date of Influenza Vaccine: None Fall 2014


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 

Tuberculosis - pt reports ppd positive but chest xray negative.





- Family History


Known Family History: Positive: None - reviewed & noncontributory, Other - 

negative FHx of breast CA


   Negative: Cardiac Disease, Hypertension, Diabetes





- Social History


Occupation: Unemployed


Lives: With Family


Alcohol Use: None


Hx Substance Use: No


Substance Use Type: Reports: None


Substance Use Comment - Amount & Last Used: Unknown


Hx Tobacco Use: No


Smoking Status (MU): Never Smoked Tobacco


Have You Smoked in the Last Year: No





Review of Systems


Negative: Vomiting, Nausea


Positive: Other - Right hand pain


Positive: Other - Forehead laceration.


Positive: Headache.  Negative: Weakness, Paresthesia, Numbness, Syncope


Positive: Anxious


All Other Systems Reviewed And Are Negative: Yes





Physical Exam


Triage Information Reviewed: Yes


Vital Signs Reviewed: Yes


Appearance: Positive: Pain Distress - Pt. lying in bed, tearful. Appears in 

pain but nontoxic. Daughter present.


Skin: Positive: Warm, Dry


Head/Face: Positive: Other - 2cm irregular fullthickness laceration noted to 

the right aspect of the forehead. Small superficial laceration over the right 

aspect of the upper eyelid.


Eyes: Positive: Normal, EOMI, MAHAMED, Conjunctiva Clear, Other: - Pain with 

movement of right eye. Anterior chamber clear.


Neck: Positive: Supple, Nontender


Musculoskeletal: Positive: Other - Diffuse pain on palpation to right hand. 

Superficial abrasion to the lateral aspect. No proximal pain. Good pulse.


Neurological: Positive: Normal, Alert, Oriented to Person Place, Time


Psychiatric: Positive: Anxious





Procedures





- Laceration/Wound Repair


  ** 1


Location: face


Description: Irregular


Anesthesia: Local, 1.0% - 5cc


Length, Depth and Shape: 2cm full thickness, irregular


Betadine Prep?: No - hibiclens


Laceration/Wound Explored: clean


Closure: Single Layer


Suture Type: Nylon - 6-0


Number of Sutures: 4


Layer Closure?: No


Sterile Dressing Applied?: No





Head Injury Course/Dx


Course Of Treatment: Pt. presenting with the above injuries after a fall. 

Stable VS. Given pt.'s significant headache and pain moving right eye. CT scan 

of brain ordered. Hand xray also ordered. Tylenol ordered which pt. apparently 

declined. Tetanus updated. Head CT negative for acute findings, reading per 

radiology. Hand xray is negative for acute findings, reading per radiology. 

Results discussed. Suture removal in 5 days. To keep wounds clean and dry. Ice 

intermittently. Tylneol for pain as directed. Close f.u with PCP and return to 

ER if sxs change or worsen. Pt. understands and agrees with plan. Pt. left the 

ER prior to getting acewrap for hand and dc papers.





- Diagnoses


Differential Diagnosis/HQI/PQRI: Cerebral Contusion, Cervical Sprain, 

Concussion Without LOC, Contusion, Hematoma, Intracranial Bleed, Laceration, 

Orbital Fracture, Skull Fracture


Provider Diagnoses: 


 Head injury, Facial laceration, Hand contusion








Discharge





- Sign-Out/Discharge


Documenting (check all that apply): Patient Departure





- Discharge Plan


Condition: Good


Disposition: HOME


Patient Education Materials:  Head Injury (ED), Hand Sprain (ED), Facial 

Laceration (ED)


Referrals: 


Lin Lima MD [Primary Care Provider] - 


Additional Instructions: 


Suture removal in 5 days 


Keep wounds clean


Ice face and hand intermittently


Tylenol for pain as directed


Return to ER if symptoms change or worsen 





- Billing Disposition and Condition


Condition: GOOD


Disposition: Home

## 2018-07-13 NOTE — RAD
INDICATION:  Head injury.



COMPARISON:  Comparison is made with a prior CT of the brain from every 22,017.



TECHNIQUE: Contiguous axial sections of the brain were obtained from the skull base to the

vertex without contrast.



FINDINGS: The ventricles, cisterns and sulci are within normal limits.  No significant

focal abnormality or mass effect is seen. There is no evidence for hemorrhage.



There is focal soft tissue swelling and a laceration in the scalp anterior to the right

frontal bone. No fracture is seen.



The visualized portion of the paranasal sinuses and mastoid air cells appear clear.



IMPRESSION:  NO EVIDENCE FOR ACUTE INTRACRANIAL ABNORMALITY.

## 2018-11-19 ENCOUNTER — HOSPITAL ENCOUNTER (EMERGENCY)
Dept: HOSPITAL 25 - ED | Age: 48
Discharge: HOME | End: 2018-11-19
Payer: MEDICARE

## 2018-11-19 VITALS — SYSTOLIC BLOOD PRESSURE: 133 MMHG | DIASTOLIC BLOOD PRESSURE: 95 MMHG

## 2018-11-19 DIAGNOSIS — R07.9: ICD-10-CM

## 2018-11-19 DIAGNOSIS — M79.7: ICD-10-CM
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
## 2018-11-19 DIAGNOSIS — S90.02XA: Primary | ICD-10-CM
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
## 2018-11-19 LAB
BASOPHILS # BLD AUTO: 0 10^3/UL (ref 0–0.2)
EOSINOPHIL # BLD AUTO: 0 10^3/UL (ref 0–0.6)
HCT VFR BLD AUTO: 39 % (ref 35–47)
HGB BLD-MCNC: 12.9 G/DL (ref 12–16)
LYMPHOCYTES # BLD AUTO: 1.4 10^3/UL (ref 1–4.8)
MCH RBC QN AUTO: 30 PG (ref 27–31)
MCHC RBC AUTO-ENTMCNC: 33 G/DL (ref 31–36)
MCV RBC AUTO: 90 FL (ref 80–97)
MONOCYTES # BLD AUTO: 0.7 10^3/UL (ref 0–0.8)
NEUTROPHILS # BLD AUTO: 5.1 10^3/UL (ref 1.5–7.7)
NRBC # BLD AUTO: 0 10^3/UL
NRBC BLD QL AUTO: 0
PLATELET # BLD AUTO: 338 10^3/UL (ref 150–450)
RBC # BLD AUTO: 4.33 10^6/UL (ref 4–5.4)
WBC # BLD AUTO: 7.2 10^3/UL (ref 3.5–10.8)

## 2018-11-19 PROCEDURE — 36415 COLL VENOUS BLD VENIPUNCTURE: CPT

## 2018-11-19 PROCEDURE — 83605 ASSAY OF LACTIC ACID: CPT

## 2018-11-19 PROCEDURE — 72110 X-RAY EXAM L-2 SPINE 4/>VWS: CPT

## 2018-11-19 PROCEDURE — 84484 ASSAY OF TROPONIN QUANT: CPT

## 2018-11-19 PROCEDURE — 80053 COMPREHEN METABOLIC PANEL: CPT

## 2018-11-19 PROCEDURE — 93005 ELECTROCARDIOGRAM TRACING: CPT

## 2018-11-19 PROCEDURE — 85025 COMPLETE CBC W/AUTO DIFF WBC: CPT

## 2018-11-19 PROCEDURE — 99283 EMERGENCY DEPT VISIT LOW MDM: CPT

## 2018-11-19 PROCEDURE — 71046 X-RAY EXAM CHEST 2 VIEWS: CPT

## 2018-11-19 RX ADMIN — NITROGLYCERIN ONE: 0.4 TABLET SUBLINGUAL at 15:34

## 2018-11-19 RX ADMIN — NITROGLYCERIN ONE MG: 0.4 TABLET SUBLINGUAL at 15:22
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
## 2019-06-03 ENCOUNTER — HOSPITAL ENCOUNTER (EMERGENCY)
Dept: HOSPITAL 25 - ED | Age: 49
LOS: 1 days | Discharge: HOME | End: 2019-06-04
Payer: MEDICARE

## 2019-06-03 DIAGNOSIS — E11.9: ICD-10-CM

## 2019-06-03 DIAGNOSIS — Z88.6: ICD-10-CM

## 2019-06-03 DIAGNOSIS — R60.0: Primary | ICD-10-CM

## 2019-06-03 LAB
ALBUMIN SERPL BCG-MCNC: 3.6 G/DL (ref 3.2–5.2)
ALBUMIN/GLOB SERPL: 1.1 {RATIO} (ref 1–3)
ALP SERPL-CCNC: 78 U/L (ref 34–104)
ALT SERPL W P-5'-P-CCNC: 24 U/L (ref 7–52)
ANION GAP SERPL CALC-SCNC: 4 MMOL/L (ref 2–11)
AST SERPL-CCNC: 17 U/L (ref 13–39)
BASOPHILS # BLD AUTO: 0.1 10^3/UL (ref 0–0.2)
BUN SERPL-MCNC: 15 MG/DL (ref 6–24)
BUN/CREAT SERPL: 19.2 (ref 8–20)
CALCIUM SERPL-MCNC: 9 MG/DL (ref 8.6–10.3)
CHLORIDE SERPL-SCNC: 105 MMOL/L (ref 101–111)
EOSINOPHIL # BLD AUTO: 0.1 10^3/UL (ref 0–0.6)
GLOBULIN SER CALC-MCNC: 3.2 G/DL (ref 2–4)
GLUCOSE SERPL-MCNC: 112 MG/DL (ref 70–100)
HCO3 SERPL-SCNC: 30 MMOL/L (ref 22–32)
HCT VFR BLD AUTO: 37 % (ref 35–47)
HGB BLD-MCNC: 12.2 G/DL (ref 12–16)
LYMPHOCYTES # BLD AUTO: 2.1 10^3/UL (ref 1–4.8)
MCH RBC QN AUTO: 29 PG (ref 27–31)
MCHC RBC AUTO-ENTMCNC: 33 G/DL (ref 31–36)
MCV RBC AUTO: 87 FL (ref 80–97)
MONOCYTES # BLD AUTO: 0.9 10^3/UL (ref 0–0.8)
NEUTROPHILS # BLD AUTO: 5.2 10^3/UL (ref 1.5–7.7)
NRBC # BLD AUTO: 0 10^3/UL
NRBC BLD QL AUTO: 0
PLATELET # BLD AUTO: 346 10^3/UL (ref 150–450)
POTASSIUM SERPL-SCNC: 4.1 MMOL/L (ref 3.5–5)
PROT SERPL-MCNC: 6.8 G/DL (ref 6.4–8.9)
RBC # BLD AUTO: 4.22 10^6 /UL (ref 3.7–4.87)
RBC UR QL AUTO: (no result)
SODIUM SERPL-SCNC: 139 MMOL/L (ref 135–145)
URATE SERPL-MCNC: 4.3 MG/DL (ref 2.3–6.6)
VIT C UR QL: (no result)
WBC # BLD AUTO: 8.3 10^3/UL (ref 3.5–10.8)
WBC UR QL AUTO: (no result)

## 2019-06-03 PROCEDURE — 81003 URINALYSIS AUTO W/O SCOPE: CPT

## 2019-06-03 PROCEDURE — 99282 EMERGENCY DEPT VISIT SF MDM: CPT

## 2019-06-03 PROCEDURE — 36415 COLL VENOUS BLD VENIPUNCTURE: CPT

## 2019-06-03 PROCEDURE — 80053 COMPREHEN METABOLIC PANEL: CPT

## 2019-06-03 PROCEDURE — 85652 RBC SED RATE AUTOMATED: CPT

## 2019-06-03 PROCEDURE — 86140 C-REACTIVE PROTEIN: CPT

## 2019-06-03 PROCEDURE — 81015 MICROSCOPIC EXAM OF URINE: CPT

## 2019-06-03 PROCEDURE — 87086 URINE CULTURE/COLONY COUNT: CPT

## 2019-06-03 PROCEDURE — 85025 COMPLETE CBC W/AUTO DIFF WBC: CPT

## 2019-06-03 PROCEDURE — 84550 ASSAY OF BLOOD/URIC ACID: CPT

## 2019-06-03 PROCEDURE — 93970 EXTREMITY STUDY: CPT

## 2019-06-03 NOTE — ED
Complex/Multi-Sys Presentation





- HPI Summary


HPI Summary: 





This pt is a 50 y/[facility]. 

Pt reports bilateral hand swelling began "months ago." She also notes that 3 

weeks ago the right side of her face had swelling. About 5 days ago she noticed 

bilateral leg swelling, left more than right. Pt saw her PCP and was referred 

to ENT but didn't go because she didn't think it was related to this. 


PMHx includes fibromyalgia, DM, nodules on her thyroid. Pt is followed up by 

Dr. Steinberg. 





- History Of Current Complaint


Chief Complaint: EDSoftTissueLowExtr


Time Seen by Provider: 06/03/19 20:43


Hx Obtained From: Patient


Onset/Duration: Lasting Weeks, Still Present


Timing: Weeks


Severity Currently: Mild


Aggravating Factor(s): nothing


Alleviating Factor(s): nothing


Associated Signs And Symptoms: Positive: Other - swelling in hands, legs, and 

face..  Negative: SOB, Chest Pain, Fever





- Allergies/Home Medications


Allergies/Adverse Reactions: 


 Allergies











Allergy/AdvReac Type Severity Reaction Status Date / Time


 


ibuprofen Allergy Intermediate Abdominal Verified 03/13/19 11:02





   Pain  














PMH/Surg Hx/FS Hx/Imm Hx


Endocrine/Hematology History: Reports: Hx Diabetes, Hx Thyroid Disease - hyper, 

Other Endocrine/Hematological Disorders - hyper thyroid


   Denies: Hx Anemia


Cardiovascular History: Reports: Hx Hypertension


   Denies: Hx Congestive Heart Failure, Hx Pacemaker/ICD


Respiratory History: Reports: Hx Asthma


   Denies: Hx Chronic Obstructive Pulmonary Disease (COPD)


GI History: Reports: Other GI Disorders - patient states chronic nausea


   Denies: Hx Jaundice


 History: 


   Denies: Hx Dialysis, Hx Renal Disease


Musculoskeletal History: Reports: Hx Arthritis, Hx Back Problems, Hx 

Fibromyalgia


   Comment Only: Other Musculoskeletal History - fibromyalgia


Sensory History: Reports: Hx Contacts or Glasses


   Denies: Hx Hearing Aid


Opthamlomology History: Reports: Hx Contacts or Glasses


Neurological History: Reports: Hx Migraine, Hx Nerve Disease - fibromyalgia, 

Other Neuro Impairments/Disorders - vertigo


Psychiatric History: Reports: Hx Anxiety, Hx Depression, Hx Panic Disorder - 

ANXIETY, Hx Suicide Attempt


   Denies: Hx Eating Disorder





- Cancer History


Hx Chemotherapy: No


Hx Radiation Therapy: No





- Surgical History


Surgery Procedure, Year, and Place: PARTIAL HYSTERECTOMY 3/2000, TUBAL 1997, 

GALLBLADDER 1995





- Immunization History


Date of Tetanus Vaccine: Unk


Date of Influenza Vaccine: None Fall 2014


Infectious Disease History: Yes


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 

Tuberculosis - pt reports ppd positive but chest xray negative.


   Denies: Traveled Outside the [place] in Last 30 Days





- Family History


Known Family History: Positive: Other - negative FHx of breast CA


   Negative: Cardiac Disease, Hypertension, Diabetes





- Social History


Alcohol Use: None


Hx Substance Use: No


Substance Use Type: Reports: None


Substance Use Comment - Amount & Last Used: Unknown


Hx Tobacco Use: No


Smoking Status (MU): Never Smoked Tobacco


Have You Smoked in the Last Year: No





Review of Systems


Negative: Fever, Chills


ENT: Other - POS: facial swelling


Respiratory: Negative


Gastrointestinal: Negative


Skin: Other - POS: swelling in legs and hands


All Other Systems Reviewed And Are Negative: Yes





Physical Exam





- Summary


Physical Exam Summary: 





VITAL SIGNS: Reviewed.


GENERAL: Patient is a well-developed and nourished female who is lying 

comfortable in the stretcher. Patient is not in any acute respiratory distress.


HEAD AND FACE: No signs of trauma. No ecchymosis, hematomas or skull 

depressions. No sinus tenderness.


EYES: PERRLA, EOMI x 2, No injected conjunctiva, no nystagmus.


EARS: Hearing grossly intact. Ear canals and tympanic membranes are within 

normal limits.


MOUTH: Oropharynx within normal limits.


NECK: Supple, trachea is midline, no adenopathy, no JVD, no carotid bruit, no c-

spine tenderness, neck with full ROM.


CHEST: Symmetric, no tenderness at palpation


LUNGS: Clear to auscultation bilaterally. No wheezing or crackles.


CVS: Regular rate and rhythm, S1 and S2 present, no murmurs or gallops 

appreciated.


ABDOMEN: Soft, non-tender. No signs of distention. No rebound no guarding, and 

no masses palpated. Bowel sounds are normal.


EXTREMITIES: FROM in all major joints, no edema, no cyanosis or clubbing.


NEURO: Alert and oriented x 3. No acute neurological deficits. Speech is normal 

and follows commands.


SKIN: Dry and warm


Triage Information Reviewed: Yes


Vital Signs On Initial Exam: 


 Initial Vitals











Temp Pulse Resp BP Pulse Ox


 


 97.9 F   96   18   138/92   99 


 


 06/03/19 19:59  06/03/19 19:59  06/03/19 19:59  06/03/19 19:59  06/03/19 19:59











Vital Signs Reviewed: Yes





Diagnostics





- Vital Signs


 Vital Signs











  Temp Pulse Resp BP Pulse Ox


 


 06/03/19 19:59  97.9 F  96  18  138/92  99














- Laboratory


Result Diagrams: 


 06/03/19 22:13





 06/03/19 22:13


Lab Statement: Any lab studies that have been ordered have been reviewed, and 

results considered in the medical decision making process.





Complex Multi-Symp Course/Dx


Assessment/Plan: This pt is a 50 y/o female presenting to [facility] c/o swelling in 

hands and legs. Pt reports bilateral hand swelling began "months ago." She also 

notes that 3 weeks ago the right side of her face had swelling. About 5 days 

ago she noticed bilateral leg swelling, left more than right. Pt saw her PCP 

and was referred to ENT but didn't go because she didn't think it was related 

to this.  PMHx includes fibromyalgia, DM, nodules on her thyroid. Pt is 

followed up by Dr. Steinberg.  Past medical history for thyroid nodule, asthma, 

migraine headaches, diabetes and depressive disorder.  In the ED course the 

patient has bilateral lower extremity edema, left more than the right.  

Therefore I ordered an ultrasound the lower extremities to rule out DVT.  Blood 

work was ordered and is pending.  This patient will be signed out to Dr. Watkins 

at shift change.





- Diagnoses


Provider Diagnoses: 


 Lower extremity edema








Discharge





- Sign-Out/Discharge


Documenting (check all that apply): Sign-Out Patient


Signing out patient TO: Osito Watkins - pending US of LE


Patient Received Moderate/Deep Sedation with Procedure: No





- Discharge Plan


Condition: Stable


Disposition: HOME


Prescriptions: 


Furosemide TAB* [Lasix TAB*] 20 mg PO DAILY #15 tab


Patient Education Materials:  Leg Edema (ED)


Referrals: 


Lin Lima MD [Primary Care Provider] - 1 Week


Additional Instructions: 


RETURN TO THE EMERGENCY DEPARTMENT FOR ANY NEW OR WORSENING SYMPTOMS.





- Attestation Statements


Document Initiated by Scribe: Yes


Documenting Scribe: Megan Garrett


Provider For Whom Scribe is Documenting (Include Credential): Austin Romano MD


Scribe Attestation: 


Megan MOSER, scribed for Austin Romano MD on 06/04/19 at 1006. 


Status of Scribe Document: Ready

## 2019-06-03 NOTE — XMS REPORT
Continuity of Care Document (CCD)

 Created on:May 10, 2019



Patient:Marielos Severino

Sex:Female

:1970

External Reference #:[id number]





Demographics







 Address  [address]



   [place] [address]

 

 Home Phone  [phone]

 

 Mobile Phone  [phone]

 

 Email Address  [email or web address]

 

 Preferred Language  en

 

 Marital Status   or 

 

 Taoist Affiliation  Unknown

 

 Race  Unknown

 

 Additional Race(s)  Other Race

 

 Ethnic Group   or 









Author







 Name  ROSALINDA Bryan

 

 Address  [address]



   Unavailable



   [place] [address]









Support







 Name  Relationship  Address  Phone

 

 Raisa Knight  Daughter  Unavailable  [phone]

 

 Anshul Gallagher  Son  Unavailable  [phone]









Care Team Providers







 Name  Role  Phone

 

 Lin Lima M.D.  Care Team Information   Unavailable

 

 Lin Lima M.D.  Primary Care Physician  Unavailable









Payers







 Date  Identification Numbers  Payment Provider  Subscriber

 

 Effective: 2018  Policy Number: [id number]  [facility] [place]









 PayID: [id number]  [address]









 [place] [address]









 Effective: 2018  Policy Number: [id number]  Medicaid [facility]









 PayID: [id number]  [address]









 [place] [address]









 Effective: 2017  Policy Number: [id number]  [facility] [place]









 Expires: 2018  PayID: [id number]  [address]









 [place] [address]









 Effective: 2017  Policy Number: [id number]  [facility]









 Expires: 2018  PayID: [id number]  [address] [address]









 [place] [address]







Advance Directives







 Description

 

 No Information Available







Problems







 Active Problems  Provider  Date

 

 Type 2 diabetes mellitus  ROSALINDA Bryan  Onset: 2016

 

 Low back pain  ROSALINDA Bryan  Onset: 2016

 

 Moderate recurrent major depression  Lin Lima M.D.  Onset: 2016

 

 Chronic back pain  Lin Lima M.D.  Onset: 2016

 

 Essential hypertension  Lin Lima M.D.  Onset: 2016

 

 Fibromyalgia  Lin Lima M.D.  Onset: 2016

 

 H/O: attempted suicide  Lin Lima M.D.  Onset: 2017









 Note: narcotic overdose









 Hyperlipidemia  Lin Lima M.D.  Onset: 2017

 

 Plantar fasciitis  Lin Lima M.D.  Onset: 2018

 

 Irritable bowel syndrome  Lin Lima M.D.  Onset: 2018

 

 Urinary tract infectious disease  Rafael López M.D.  Onset: 2018

 

 Removal of suture  Rafael López M.D.  Onset: 2018







Family History







 Date  Family Member(s)  Observation  Comments

 

   Father   due to cholecystitis  ()

 

   Father  No Current Problems  

 

   Mother  Cancer  thyroid

 

   Mother  hypoglycemic  

 

   First Son  Prader Willi  

 

   First Son  23  

 

   First Son  Developmentally disabled  

 

   First Brother  Diabetes Mellitus, II  

 

   First Sister  Asthma  

 

   First Sister  Esophageal Dysmotility  

 

   Second Sister  No Current Problems  







Social History







 Type  Date  Description  Comments

 

 Birth Sex    Unknown  

 

 Lives With    Son  

 

 Lives With    Boyfriend  

 

 Occupation    Not Currently Working  

 

 Occupation    Disabled  dt diabetes,



       fibromyalgia. Takes



       care of disabled 24 yo son.

 

 Tobacco Use  Start: Unknown  Never Smoked Cigarettes  

 

 ETOH Use    Denies alcohol use  

 

 Tobacco Use  Start: Unknown  Patient has never  



     smoked  

 

 Smoking Status  Reviewed: 18  Patient has never  



     smoked  

 

 Exercise Type/Frequency    Exercises rarely  







Allergies, Adverse Reactions, Alerts







 Active Allergies  Reaction  Severity  Comments  Date

 

 Motrin  Nausea and Vomiting      2016







Medications







 Active Medications  SIG  Qnty  Indications  Ordering Provider  Date

 

 Valacyclovir HCL  1 by mouth three  21tabs  B02.8  Adriana  2019



                1gm  times a day x 7      [place], FNP  



 Tablets  days        



           

 

 Walker With Wheels/  use as directed    M79.7  Lin Lima,  2019



 Brakes And Seat  seen 3/11/19      M.D.  









 G89.4

 

 M15.0









 Shower Chair With  use as directed seen  1units  G89.4  Lin Lima M.D.  



 Handles  3/11/19        









 M15.0

 

 M79.7









 Magnesium  1 tab by mouth  30caps    Lin Lima,  2019



      500mg Capsules  every evening      M.DLuis F  



           

 

 Lancets Micro Thin 33G  test three times a  100units  E11.9  Lin Lima,  



                   Thin  day dx. e11.9 last      M.D.  



 33G [facility]  office visit        



   18. used with        



   true metrix        



   meter/lancing        



   device        

 

 Mupirocin  apply to infections  22gm  J01.00  Óscar STARKEY  2018



      2% Ointment  twice daily.      MD Tian  



           

 

 Atorvastatin Calcium  1 by mouth every  30tabs    [facility],  2018



                 20mg  day      M.D.  



 Tablets          

 

 Naproxen  take 1 tablet every  60tabs    [facility],  2018



     500mg Tablets  12 hours as needed      M.D.  



   with food        

 

 Mometasone Furoate  apply to arms twice  45gm    [facility],  2018



               0.1% Cream  a day x 1 week then      M.D.  



   as needed        

 

 Ranitidine HCL  1 tab by mouth  60tabs  R19.7  [facility],  2018



           150mg Tablets  twice a day      M.D.  



           

 

 Methocarbamol  1 tab by mouth  60tabs    [facility],  2018



          750mg Tablets  twice a day as      M.D.  



   needed        

 

 Lisinopril  1 by mouth every  90tabs  I10  [facility],  10/23/2017



       10mg Tablets  day      M.D.  



           

 

 Nitroglycerin  1 sl q5 min x 3  14tabs    [facility],  10/23/2017



          0.4mg Tablets  doses as needed for      M.D.  



 Sub  chest pain; if no        



   relief after 3        



   doses call 911        

 

 Diphenhydramine HCL  1-2 tabs by mouth  60tabs  R11.10  [facility],  2017



                25mg  every 6 hours as      M.D.  



 Tablets  needed        

 

 Metoclopramide HCL  take one tablet by  30tabs  R11.10  [facility]  2017



               10mg  mouth every 8 hours      [place],  



 Tablets  as needed for      Afnp-C  



   nausea        

 

 Onetouch Ultra 2  test blood sugar  1units    Óscar LAI  10/24/2016



             w/Device Kit  once a day or as      ADELE Riojas  



   directed dx: e11.9        



   last office visit        



   10/19/16        

 

 Ventolin HFA  inhale two puffs by  18units  J06.9  Chacha  10/17/2016



         108(90Base)  mouth qid prn      [place]f,  



 mcg/Act Aerosol  cough/wheeze      Afnp-C  



           

 

 Januvia  Take One Tablet By  30tabs    Lindsay Ann  2016



    50mg Tablets  Mouth Every Day      ASHLY Lee  



           

 

 Hydroxyzine HCL  take one tablet by      Unknown  



            50mg Tablets  mouth twice a day        



           

 

 Gabapentin  1 by mouth three    B02.8  Unknown  



       300mg Capsules  times a day        



           

 

 Abilify  1 by mouth every      Unknown  



    2mg Tablets  morning        



           

 

 Cyanocobalamin  Dr Samson      Unknown  



           1000mcg/ML          



 Solution          



           

 

 Effexor XR  1 by mouth every      Unknown  



       75mg Caps ER 24HR  day along with 150        



   mg        

 

 Effexor XR  1 by mouth every      Unknown  



       150mg Caps ER 24HR  day along with 75        



   mg        

 

 Zyrtec Allergy  prn      Unknown  



           10mg Tablets          



           

 

 Singulair  1 by mouth every      Unknown  



      10mg Tablets  day        



           

 

 Oxycodone HCL  tid as needed       Unknown  



          15mg Tablets  Samson        



           









 History Medications









 Medrol  dose-pack as  1pack  B02.8  Adriana  2019 -



     4mg Tablets  instructed      ROSALINDA Porter  2019



           

 

 Amoxicillin/Clavulanat  1 by mouth twice a  14tabs  J01.00  Óscar STARKEY  2018 -



 e Potassium  day      MD Tian  03/10/2019



          875-125mg          



 Tablets          



           

 

 Clindamycin HCL  1 by mouth three  30caps  R21  Anna Cameron,  10/24/2018 -



              300mg  times a day x 10d      FN  2018



 Capsules          



           

 

 Hand Held Shower Head  dx:  m79.7  1unmushtaq MALONEY  2018 -



   maricarmen Olivares M.D.  2018

 

 Shower Chair  m79.7  1unmushtaq MALONEY  2018 -



   Maricarmen Olivares M.D.  2018

 

 Incontinence Liners  r39.81  1Box    Jade MALONEY  2018 -



   yojana Olivares M.D.  2018

 

 Bactrim DS  1 by mouth twice a  10tabs    Rafael FLuis F  2018 -



         800-160mg  day      ADELE López  2018



 Tablets          



           

 

 Azithromycin  2 by mouth every  6tabs    [facility]  2018 -



           250mg  day x1 then 1 by      Juan  2018



 Tablets  mouth every day x      Afnp-C  



   4 more days        

 

 Cyclobenzaprine HCL  take one tablet by  90tabs    [facility] [place],  2017 -



                  10mg  mouth every 8      M.D.  2018



 Tablets  hours as needed        



           

 

 Bactrim DS  1 by mouth twice a  10tabs    Óscar MING  2017 -



         800-160mg  day      Breiman, M.D.  2017



 Tablets          



           

 

 Ondansetron  1 tab by mouth  30tabs  R11.10  [facility],  2017 -



          4mg Tablets  every 6 hours as      M.D.  2018



 Dispers  needed        



           

 

 Zofran Odt  dissolve 1 tab  30tabs    [facility],  2017 -



         4mg Tablets  under tongue every      M.D.  2019



 Dispers  6 hours as needed        



   nausea        

 

 Azithromycin  take 2 tablets by  12tabs  J06.9  Anna Cameron,  10/17/2016 -



           250mg  mouth x 3d then      FNP  2016



 Tablets  take 1 tablet        



   daily for next 6        



   days        

 

 Januvia  1 by mouth every  30tabs    [facility],  2016 -



      100mg Tablets  day      M.D.  2016



           

 

 Sphygmomanometer  take blood    I10  [facility],  2016 -



                Misc  pressure daily      M.D.  2017



           

 

 Truetrack Test  test once a day  100units  E11.9  [facility],  2016 -



              Strips  Dx: E11.9  Last      M.D.  10/24/2016



   office 16        

 

 Lisinopril  Take One Tablet By  30tabs  I10  [facility],  2016 -



         5mg Tablets  Mouth Every Day      M.D.  10/23/2017



           

 

 Effexor XR  1 by mouth qd      Unknown   -



         37.5mg Caps ER  w/75mg        2016



 24HR          

 

 Effexor XR  1 by mouth every      Unknown   -



         75mg Caps ER  day w/37.5        2016



 24HR          

 

 Valium  as needed       Unknown   -



     10mg Tablets  Jody        2019



           

 

 Albuterol Sulfate  2 puffs every 4      Unknown   -



                 Powder  hours as needed        2017



           

 

 Albuterol Sulfate  3ml per nebulizer      Unknown   -



   every 6 hours as        2018



 1.25mg/3ML Nebulizer  needed        



           

 

 Effexor XR  1 by mouth every      Unknown   -



         150mg Caps ER  day        2017



 24HR          

 

 Gabapentin  1 po qid      Unknown   -



         300mg Capsules          2019



           

 

 Lipitor  1 by mouth every      Unknown   -



      20mg Tablets  day        10/23/2017



           

 

 Atorvastatin Calcium  1 by mouth every  30tabs    Lin Lima,   -



                   10mg  day      M.D.  2018



 Tablets          



           







Immunizations







 Description

 

 No Information Available







Vital Signs







 Date  Vital  Result  Comment

 

 05/10/2019  1:03pm  BP Systolic  128 mmHg  









 BP Diastolic  72 mmHg  

 

 Heart Rate  94 /min  

 

 Body Temperature  98.4 F  

 

 Respiratory Rate  20 /min  

 

 Weight  182.00 lb  









 2019 12:38pm  BP Systolic  128 mmHg  









 BP Diastolic  80 mmHg  

 

 Heart Rate  82 /min  

 

 Body Temperature  97.7 F  

 

 Respiratory Rate  16 /min  

 

 Height  61 inches  5'1"

 

 Weight  181.00 lb  

 

 BMI (Body Mass Index)  34.2 kg/m2  









 2019 11:09am  BP Systolic  118 mmHg  









 BP Diastolic  74 mmHg  

 

 Heart Rate  88 /min  

 

 Body Temperature  97.7 F  

 

 Respiratory Rate  20 /min  

 

 Weight  177.00 lb  









 2019 12:34pm  BP Systolic  118 mmHg  









 BP Diastolic  60 mmHg  

 

 Heart Rate  72 /min  

 

 Body Temperature  98.1 F  

 

 Respiratory Rate  16 /min  

 

 Height  61 inches  5'1"

 

 Weight  180.25 lb  

 

 BMI (Body Mass Index)  34.1 kg/m2  









 2018 11:06am  BP Systolic  118 mmHg  









 BP Diastolic  76 mmHg  

 

 Heart Rate  78 /min  

 

 Body Temperature  97.7 F  

 

 Respiratory Rate  16 /min  

 

 Height  61 inches  5'1"

 

 Weight  169.00 lb  

 

 BMI (Body Mass Index)  31.9 kg/m2  









 2018  2:51pm  BP Systolic  120 mmHg  









 BP Diastolic  80 mmHg  

 

 Heart Rate  68 /min  

 

 Body Temperature  98.3 F  

 

 Respiratory Rate  18 /min  

 

 Height  61 inches  5'1"

 

 Weight  166.00 lb  

 

 BMI (Body Mass Index)  31.4 kg/m2  









 10/24/2018  3:19pm  BP Systolic  140 mmHg  









 BP Diastolic  82 mmHg  

 

 Heart Rate  106 /min  

 

 Body Temperature  98.2 F  

 

 Respiratory Rate  17 /min  

 

 Height  61 inches  5'1"

 

 Weight  172.00 lb  

 

 BMI (Body Mass Index)  32.5 kg/m2  









 2018  6:10pm  BP Systolic  126 mmHg  









 BP Diastolic  80 mmHg  

 

 Heart Rate  78 /min  

 

 Body Temperature  98.2 F  

 

 Respiratory Rate  16 /min  

 

 Height  61 inches  5'1"

 

 Weight  161.50 lb  

 

 BMI (Body Mass Index)  30.5 kg/m2  









 2018  3:00pm  BP Systolic  132 mmHg  









 BP Diastolic  80 mmHg  

 

 Heart Rate  84 /min  

 

 Body Temperature  98.1 F  

 

 Respiratory Rate  16 /min  

 

 Height  61 inches  5'1"

 

 Weight  160.12 lb  

 

 BMI (Body Mass Index)  30.3 kg/m2  









 2018 11:17am  BP Systolic  122 mmHg  









 BP Diastolic  80 mmHg  

 

 Heart Rate  80 /min  

 

 Body Temperature  97.5 F  

 

 Height  61 inches  5'1"

 

 Weight  163.00 lb  

 

 BMI (Body Mass Index)  30.8 kg/m2  









 2018  4:46pm  BP Systolic  120 mmHg  









 BP Diastolic  70 mmHg  

 

 Heart Rate  76 /min  

 

 Body Temperature  98.2 F  

 

 Respiratory Rate  16 /min  

 

 Height  61 inches  5'1"

 

 Weight  160.00 lb  

 

 BMI (Body Mass Index)  30.2 kg/m2  









 2018  4:21pm  BP Systolic  138 mmHg  









 BP Diastolic  80 mmHg  

 

 Heart Rate  96 /min  

 

 Body Temperature  98.8 F  

 

 Respiratory Rate  16 /min  

 

 Height  61 inches  5'1"

 

 Weight  163.00 lb  

 

 BMI (Body Mass Index)  30.8 kg/m2  









 2018  3:30pm  BP Systolic  130 mmHg  









 BP Diastolic  60 mmHg  

 

 Heart Rate  76 /min  

 

 Body Temperature  98.6 F  

 

 Respiratory Rate  16 /min  

 

 Height  61 inches  5'1"

 

 Weight  164.00 lb  

 

 BMI (Body Mass Index)  31.0 kg/m2  









 2018 10:34am  BP Systolic  136 mmHg  









 BP Diastolic  78 mmHg  

 

 Heart Rate  76 /min  

 

 Body Temperature  99.1 F  

 

 Respiratory Rate  16 /min  

 

 Height  61 inches  5'1"

 

 Weight  161.00 lb  

 

 BMI (Body Mass Index)  30.4 kg/m2  









 2018 10:27am  BP Systolic  124 mmHg  









 BP Diastolic  80 mmHg  

 

 Heart Rate  84 /min  

 

 Body Temperature  98.4 F  

 

 Respiratory Rate  16 /min  

 

 Height  61 inches  5'1"

 

 Weight  174.12 lb  

 

 BMI (Body Mass Index)  32.9 kg/m2  









 2018 10:25am  BP Systolic  144 mmHg  









 BP Diastolic  84 mmHg  

 

 Heart Rate  74 /min  

 

 Body Temperature  98.1 F  

 

 Respiratory Rate  16 /min  

 

 Height  61 inches  5'1"

 

 Weight  173.00 lb  

 

 BMI (Body Mass Index)  32.7 kg/m2  









 2017  2:41pm  BP Systolic  120 mmHg  









 BP Diastolic  80 mmHg  

 

 Heart Rate  74 /min  

 

 Body Temperature  98.8 F  

 

 Respiratory Rate  16 /min  

 

 Height  61 inches  5'1"

 

 Weight  170.00 lb  

 

 BMI (Body Mass Index)  32.1 kg/m2  









 2017  5:56pm  BP Systolic  120 mmHg  









 BP Diastolic  80 mmHg  

 

 Heart Rate  68 /min  

 

 Body Temperature  98.6 F  

 

 Respiratory Rate  18 /min  

 

 Height  61 inches  5'1"

 

 Weight  166.00 lb  

 

 BMI (Body Mass Index)  31.4 kg/m2  









 10/23/2017  7:20pm  BP Systolic  110 mmHg  









 BP Diastolic  70 mmHg  

 

 Heart Rate  76 /min  

 

 Body Temperature  98.8 F  

 

 Respiratory Rate  18 /min  

 

 Height  61 inches  5'1"

 

 Weight  167.00 lb  

 

 BMI (Body Mass Index)  31.6 kg/m2  









 10/02/2017  2:15pm  BP Systolic  120 mmHg  









 BP Diastolic  60 mmHg  

 

 Heart Rate  84 /min  

 

 Body Temperature  97.9 F  

 

 Respiratory Rate  16 /min  

 

 Height  61 inches  5'1"

 

 Weight  166.38 lb  

 

 BMI (Body Mass Index)  31.4 kg/m2  









 2017 11:50am  BP Systolic  134 mmHg  









 BP Diastolic  78 mmHg  

 

 Heart Rate  80 /min  

 

 Body Temperature  98.6 F  

 

 Respiratory Rate  18 /min  

 

 Height  61 inches  5'1"

 

 Weight  163.00 lb  

 

 BMI (Body Mass Index)  30.8 kg/m2  









 2017  5:59pm  BP Systolic  132 mmHg  









 BP Diastolic  78 mmHg  

 

 Heart Rate  84 /min  

 

 Body Temperature  98.0 F  

 

 Respiratory Rate  16 /min  

 

 Height  61 inches  5'1"

 

 Weight  163.00 lb  

 

 BMI (Body Mass Index)  30.8 kg/m2  









 2017  4:29pm  BP Systolic  130 mmHg  









 BP Diastolic  80 mmHg  

 

 Heart Rate  76 /min  

 

 Respiratory Rate  16 /min  

 

 Height  61 inches  5'1"

 

 Weight  160.00 lb  

 

 BMI (Body Mass Index)  30.2 kg/m2  









 2017  8:09am  BP Systolic  100 mmHg  









 BP Diastolic  70 mmHg  

 

 Heart Rate  88 /min  

 

 Body Temperature  98.6 F  

 

 Respiratory Rate  16 /min  

 

 Height  61 inches  5'1"

 

 Weight  151.00 lb  

 

 BMI (Body Mass Index)  28.5 kg/m2  









 2017 10:57am  BP Systolic  130 mmHg  









 BP Diastolic  72 mmHg  

 

 Heart Rate  84 /min  

 

 Body Temperature  97.3 F  

 

 Height  61 inches  5'1"

 

 Weight  160.00 lb  

 

 BMI (Body Mass Index)  30.2 kg/m2  









 2016 10:38am  BP Systolic  118 mmHg  









 BP Diastolic  76 mmHg  

 

 Heart Rate  78 /min  

 

 Body Temperature  98.2 F  

 

 Respiratory Rate  16 /min  

 

 Height  61 inches  5'1"

 

 Weight  158.00 lb  

 

 BMI (Body Mass Index)  29.9 kg/m2  









 10/17/2016  3:08pm  BP Systolic  110 mmHg  









 BP Diastolic  60 mmHg  

 

 Heart Rate  80 /min  

 

 Body Temperature  98.8 F  

 

 Respiratory Rate  18 /min  

 

 O2 % BldC Oximetry  98 %  

 

 Height  61 inches  5'1"

 

 Weight  145.00 lb  

 

 BMI (Body Mass Index)  27.4 kg/m2  









 2016  2:17pm  BP Systolic  120 mmHg  









 BP Diastolic  70 mmHg  

 

 Heart Rate  84 /min  

 

 Body Temperature  97.7 F  

 

 Respiratory Rate  16 /min  

 

 Height  61 inches  5'1"

 

 Weight  145.00 lb  

 

 BMI (Body Mass Index)  27.4 kg/m2  









 2016  4:14pm  BP Systolic  120 mmHg  









 BP Diastolic  76 mmHg  

 

 Heart Rate  80 /min  

 

 Body Temperature  98.2 F  

 

 Respiratory Rate  16 /min  

 

 Height  61 inches  5'1"

 

 Weight  143.00 lb  

 

 BMI (Body Mass Index)  27.0 kg/m2  









 2016 10:06am  BP Systolic  118 mmHg  









 BP Diastolic  80 mmHg  

 

 Heart Rate  80 /min  

 

 Body Temperature  98.4 F  

 

 Respiratory Rate  16 /min  

 

 Height  61 inches  5'1"

 

 Weight  143.25 lb  

 

 BMI (Body Mass Index)  27.1 kg/m2  







Results







 Test  Date  Facility  Test  Result  H/L  Range  Note

 

 Laboratory test  2019  [facility]  Hemoglobin A1c  5.7 %    4.1-5.7
  



 finding    (607)-   -  ([facility])        

 

 Ua - Micro ([facility])  2019  [facility]  Appearance  clear      



     (607)-   -          









 Color  yellow      

 

 Glucose, Urine ([facility])  -      

 

 Bilirubin  -      

 

 Ketones  15 mg/dL  #    

 

 SP Grav  >=1.030      

 

 Blood  -      

 

 PH  5.5      

 

 Protein  -      

 

 Urobil  0.2      

 

 Nitrite  -      

 

 Leukocytes ([facility])  -      

 

 Hyaline  - /Lpf      

 

 Granular  - /Lpf      

 

 WBC ([facility])  3-5      

 

 RBC  0-1      

 

 Mucus ([facility])  sm amt /Lpf      

 

 Epith  few /Lpf      

 

 Bacteria  trace /Hpf      

 

 Amorphous ([facility])  - /Lpf      

 

 Crystals, Fluid ([facility])  Ca+ oxalate  #    









 Laboratory test  2019  [facility]  Urine Culture  <10,000      



 finding    (607)-   -  ([facility])        

 

 [place]  [place]  [facility]  White Blood Count  4.8 10^3/uL  N  3.5-10.  



             8  









 Red Blood Count  4.14 10^6/uL  N  4.00-5.40  

 

 Hemoglobin  12.2 g/dL  N  12.0-16.0  

 

 Hematocrit  37 %  N  35-47  

 

 Mean Corpuscular Volume  88 fL  N  80-97  

 

 Mean Corpuscular Hemoglobin  29 pg  N  27-31  

 

 Mean Corpuscular HGB Conc  33 g/dL  N  31-36  

 

 Red Cell Distribution Width  13 %  N  10.5-15  

 

 Platelet Count  360 10^3/uL  N  150-450  

 

 Mean Platelet Volume  6.4 fL  Low  7.4-10.4  

 

 Abs Neutrophils  2.6 10^3/uL  N  1.5-7.7  

 

 Abs Lymphocytes  1.4 10^3/uL  N  1.0-4.8  

 

 Abs Monocytes  0.6 10^3/uL  N  0-0.8  

 

 Abs Eosinophils  0.1 10^3/uL  N  0-0.6  

 

 Abs Basophils  0.1 10^3/uL  N  0-0.2  

 

 Abs Nucleated RBC  0 10^3/uL      

 

 Granulocyte %  54.8 %      

 

 Lymphocyte %  28.4 %      

 

 Monocyte %  13.0 %      

 

 Eosinophil %  2.8 %      

 

 Basophil %  1.0 %      

 

 Nucleated Red Blood Cells %  0.1      









 [place]  [place]  [facility] [place]  HIV 1&2 AB Self  Nonreactive    Nonreactive  
1



 finding      Referred        

 

 [place]  [place]  [facility] [place]  Sodium  138 mmol/L  N  135-145  









 Potassium  3.8 mmol/L  N  3.5-5.0  

 

 Chloride  106 mmol/L  N  101-111  

 

 Co2 Carbon Dioxide  29 mmol/L  N  22-32  

 

 Anion Gap  3 mmol/L  N  2-11  

 

 Glucose  95 mg/dL  N    

 

 Blood Urea Nitrogen  10 mg/dL  N  6-24  

 

 Creatinine  0.71 mg/dL  N  0.51-0.95  

 

 BUN/Creatinine Ratio  14.1  N  8-20  

 

 Calcium  8.7 mg/dL  N  8.6-10.3  

 

 Total Protein  6.8 g/dL  N  6.4-8.9  

 

 Albumin  3.8 g/dL  N  3.2-5.2  

 

 Globulin  3.0 g/dL  N  2-4  

 

 Albumin/Globulin Ratio  1.3  N  1-3  

 

 Total Bilirubin  0.30 mg/dL  N  0.2-1.0  

 

 Alkaline Phosphatase  70 U/L  N    

 

 Alt  23 U/L  N  7-52  

 

 Ast  24 U/L  N  13-39  

 

 Egfr Non-  87.5    >60  

 

 Egfr   105.9    >60  2









 Laboratory test finding  2019  [facility]  C Reactive Protein  9.61 mg/L  High  
<8.01  









 Troponin I  0.00 ng/mL    <0.04  3

 

 Vitamin B12  420 pg/mL  N  180-914  4









 Comprehensive Metabolic  2018  [facility])  Sodium  145 mEq/L    
134-149  



 Prof              









 Potassium  4.3 mEq/L    3.6-5.5  

 

 Chloride  109 mEq/L      

 

 Carbon Dioxide  29 mEq/L    21-32  

 

 Glucose  115 mg/dL  High    

 

 BUN  14 mg/dL    6-26  

 

 Creatinine  0.8 mg/dL    0.6-1.4  

 

 BUN/Creat Ratio  17.5 CALC    8.0-36.0  

 

 Calcium  9.4 mg/dL    8.6-10.2  

 

 Total Protein  7.8 g/dL    6.4-8.3  

 

 Albumin  4.8 g/dL    3.8-5.5  

 

 Globulin  3.0 g/dL    2.0-4.8  

 

 A/G Ratio  1.6 CALC    0.6-2.3  

 

 Alk. Phosphatase  85 U/L      

 

 Alt (SGPT)  28 U/L    7-35  

 

 Ast (Sgot)  16 U/L    5-34  

 

 Total Bilirubin  0.2 mg/dL    0.2-1.3  

 

 GFR Non-African American  >60 ml/min/1.73m^    >=60  

 

 GFR African American  >60 ml/min/1.73m^    >=60  









 Lipid Profile  2018  [facility])  Cholesterol  227 mg/dL  High  
120-200  









 Triglycerides  90 mg/dL      

 

 HDL Cholesterol  74 mg/dL    30-85  

 

 LDL (Calculated)  135 CALC  High  0-129  

 

 VLDL Cholesterol  18 mg/dL    0-50  

 

 HDL Risk Factor  3.1 CALC    0.0-4.4  









 Laboratory test finding  2018  [facility])  TSH  1.33 mIU/L    
0.50-6.00  









 Free T4  1.26 ng/dL    0.75-1.54  









 CBC Electronic a  2018  [facility])  WBC  5.7 x10^3/UL    4.0-
10.0  









 RBC  4.31 x10^6/UL    3.93-6.00  

 

 HGB  12.9 g/dL    12.0-17.0  

 

 HCT  39 %    35-50  

 

 MCV  91.2 fL    80.0-95.0  

 

 MCH  29.9 pg    25.6-32.2  

 

 MCHC  32.8 g/dL    32.2-36.0  

 

 RDW-CV  13.8 %    11.6-14.4  

 

 PLT  391 x10^3/UL    163-400  

 

 MPV  9.1 fL  Low  9.4-12.4  

 

 Skye#  3.37 x10^3/UL    1.56-6.13  

 

 Lymph#  1.69 x10^3/UL    1.18-3.74  

 

 Mono#  0.50 x10^3/UL    0.24-0.82  

 

 Eos #  0.1 x10^3/UL    0.0-0.5  

 

 Baso #  0.03 x10^3/UL    0.01-0.08  

 

 Skye%  59.2 %    34.0-70.0  

 

 Lymph %  29.7 %    20.0-52.0  

 

 Mono%  8.8 %    5.0-12.0  

 

 Eos%  1.4 %    0.7-7.0  

 

 Baso%  0.5 %    0.1-1.2  









 Laboratory test  2018  [facility]  Hemoglobin A1c  5.7 %    4.1-5.7
  



 finding    (607)-   -  ([facility]ma)        

 

 Laboratory test  2018  [facility]  Troponin I  0.00 ng/mL    <0.04  



 finding              

 

 Laboratory test  2018  [facility]  Troponin I  0.00 ng/mL    <0.04  5



 finding              

 

 [place]  [place]  [facility]  White Blood Count  7.2  N  3.5-10.8  



         10^3/uL      









 Red Blood Count  4.33 10^6/uL  N  4.00-5.40  

 

 Hemoglobin  12.9 g/dL  N  12.0-16.0  

 

 Hematocrit  39 %  N  35-47  

 

 Mean Corpuscular Volume  90 fL  N  80-97  

 

 Mean Corpuscular Hemoglobin  30 pg  N  27-31  

 

 Mean Corpuscular HGB Conc  33 g/dL  N  31-36  

 

 Red Cell Distribution Width  14 %  N  10.5-15  

 

 Platelet Count  338 10^3/uL  N  150-450  

 

 Mean Platelet Volume  6.6 fL  Low  7.4-10.4  

 

 Abs Neutrophils  5.1 10^3/uL  N  1.5-7.7  

 

 Abs Lymphocytes  1.4 10^3/uL  N  1.0-4.8  

 

 Abs Monocytes  0.7 10^3/uL  N  0-0.8  

 

 Abs Eosinophils  0 10^3/uL  N  0-0.6  

 

 Abs Basophils  0 10^3/uL  N  0-0.2  

 

 Abs Nucleated RBC  0 10^3/uL      

 

 Granulocyte %  69.9 %  N  38-83  

 

 Lymphocyte %  19.0 %  Low  25-47  

 

 Monocyte %  10.1 %  High  0-7  

 

 Eosinophil %  0.6 %  N  0-6  

 

 Basophil %  0.4 %  N  0-2  

 

 Nucleated Red Blood Cells %  0      









 Comp Metabolic Panel  2018  [facility]  Sodium  138 mmol/L  N  135-145  









 Potassium  3.5 mmol/L  N  3.5-5.0  

 

 Chloride  102 mmol/L  N  101-111  

 

 Co2 Carbon Dioxide  30 mmol/L  N  22-32  

 

 Anion Gap  6 mmol/L  N  2-11  

 

 Glucose  115 mg/dL  High    

 

 Blood Urea Nitrogen  11 mg/dL  N  6-24  

 

 Creatinine  0.70 mg/dL  N  0.51-0.95  

 

 BUN/Creatinine Ratio  15.7  N  8-20  

 

 Calcium  9.3 mg/dL  N  8.6-10.3  

 

 Total Protein  7.2 g/dL  N  6.4-8.9  

 

 Albumin  3.9 g/dL  N  3.2-5.2  

 

 Globulin  3.3 g/dL  N  2-4  

 

 Albumin/Globulin Ratio  1.2  N  1-3  

 

 Total Bilirubin  0.30 mg/dL  N  0.2-1.0  

 

 Alkaline Phosphatase  71 U/L  N    

 

 Alt  13 U/L  N  7-52  

 

 Ast  16 U/L  N  13-39  

 

 Egfr Non-  88.9    >60  

 

 Egfr   107.6    >60  6









 Laboratory test finding  2018  [facility]  Troponin I  0.00 ng/mL    <0.04  









 Lactic Acid  1.6 mmol/L  N  0.5-2.0  7









 Laboratory test  10/24/2018  [facility]  Anaerobic  TNP  Abnormal    8, 9



 finding    [address] [place] [address]          



     (607)-   -          









 Test Code Change  See Comment:      10

 

 Request Problem  TNP      11









 Aerobic Bacterial  10/24/2018  [facility]  Aerobic Bacterial  Final report      12



 Culture    [address] [facility]  Culture        



     [place] [address]          



     (607)-   -          









 Result 1  See Comment:      13

 

 Result 2  See Comment:      14









 Ua - Micro ([facility]ma)  2018  [facility]  Appearance  CLOUDY      



     (607)-   -          









 Color  YELLOW      

 

 Glucose, Urine ([facility])  NEG      

 

 Bilirubin  ICTO:NEG      

 

 Ketones  TRACE  #    

 

 SP Grav  >=1.030      

 

 Blood  NEG      

 

 PH  5.5      

 

 Protein  SSA:NEG      

 

 Urobil  0.2      

 

 Nitrite  POSITIVE  #    

 

 Leukocytes ([facility])  NEGATIVE      

 

 Hyaline  10-15 /Lpf  #    

 

 Granular  - /Lpf      

 

 WBC ([facility])  10-12  #    

 

 RBC  3-5  #    

 

 Mucus  SMALL AMOUNT /Lpf  #    

 

 Epith  MODERATE /Lpf  #    

 

 Bacteria  4+ /Hpf  #    

 

 Amorphous  - /Lpf      

 

 Crystals, Fluid ([facility])  -      

 

 Z#Comments  -      









 Laboratory test finding  2018  [facility])  TSH  2.46 mIU/L    
0.50-6.00  









 Free T4  1.03 ng/dL    0.75-1.54  









 Comprehensive Metabolic  2018  [facility])  Sodium  136 mEq/L    
134-149  



 Prof              









 Potassium  4.1 mEq/L    3.6-5.5  

 

 Chloride  102 mEq/L      

 

 Carbon Dioxide  26 mEq/L    21-32  

 

 Glucose  108 mg/dL  High    

 

 BUN  10 mg/dL    6-26  

 

 Creatinine  0.8 mg/dL    0.6-1.4  

 

 BUN/Creat Ratio  12.5 CALC    8.0-36.0  

 

 Calcium  9.2 mg/dL    8.6-10.2  

 

 Total Protein  7.1 g/dL    6.4-8.3  

 

 Albumin  4.3 g/dL    3.8-5.5  

 

 Globulin  2.8 g/dL    2.0-4.8  

 

 A/G Ratio  1.5 CALC    0.6-2.3  

 

 Alk. Phosphatase  82 U/L      

 

 Alt (SGPT)  20 U/L    7-35  

 

 Ast (Sgot)  21 U/L    5-34  

 

 Total Bilirubin  0.4 mg/dL    0.2-1.3  

 

 GFR Non-African American  >60 ml/min/1.73m^    >=60  

 

 GFR African American  >60 ml/min/1.73m^    >=60  









 Lipid Profile  2018  [facility])  Cholesterol  244 mg/dL  High  
120-200  









 Triglycerides  85 mg/dL      

 

 HDL Cholesterol  64 mg/dL    30-85  

 

 LDL (Calculated)  163 CALC  High  0-129  

 

 VLDL Cholesterol  17 mg/dL    0-50  

 

 HDL Risk Factor  3.8 CALC    0.0-4.4  









 CBC Electronic Fma  2018  [facility])  WBC  4.1 x10^3/UL    4.0-
10.0  









 RBC  4.47 x10^6/UL    3.93-6.00  

 

 HGB  13.3 g/dL    12.0-17.0  

 

 HCT  40 %    35-50  

 

 MCV  88.6 fL    80.0-95.0  

 

 MCH  29.8 pg    25.6-32.2  

 

 MCHC  33.6 g/dL    32.2-36.0  

 

 RDW-CV  13.9 %    11.6-14.4  

 

 PLT  407 x10^3/UL  High  163-400  

 

 MPV  9.4 fL    9.4-12.4  

 

 Skye#  2.27 x10^3/UL    1.56-6.13  

 

 Lymph#  1.32 x10^3/UL    1.18-3.74  

 

 Mono#  0.42 x10^3/UL    0.24-0.82  

 

 Eos #  0.1 x10^3/UL    0.0-0.5  

 

 Baso #  0.01 x10^3/UL    0.01-0.08  

 

 Skye%  55.9 %    34.0-70.0  

 

 Lymph %  32.4 %    20.0-52.0  

 

 Mono%  10.3 %    5.0-12.0  

 

 Eos%  1.2 %    0.7-7.0  

 

 Baso%  0.2 %    0.1-1.2  









 Laboratory test  2018  [facility]  Hemoglobin A1c  5.5 %    4.1-5.7
  



 finding    (607)-   -  ([facility])        

 

 Basic Metabolic  2018  [facility])  Sodium  141 mEq/L    134-149
  



 Profile              









 Potassium  3.6 mEq/L    3.6-5.5  

 

 Chloride  100 mEq/L      

 

 Carbon Dioxide  27 mEq/L    21-32  

 

 Glucose  115 mg/dL  High    15

 

 BUN  12 mg/dL    6-26  

 

 Creatinine  0.7 mg/dL    0.6-1.4  

 

 BUN/Creat Ratio  17.1 CALC    8.0-36.0  

 

 Calcium  10.2 mg/dL    8.6-10.2  

 

 GFR Non-African American  >60 ml/min/1.73m^    >=60  

 

 GFR African American  >60 ml/min/1.73m^    >=60  









 [place]  [place]  [facility]  Urine Color  Yellow      









 Urine Appearance  Clear      

 

 Urine Specific Gravity  1.015  N  1.010-1.030  

 

 Urine pH  8.0  N  5-9  

 

 Urine Urobilinogen  Negative    Negative  

 

 Urine Ketones  1+  Abnormal  Negative  

 

 Urine Protein  Negative    Negative  

 

 Urine Leukocytes  Negative    Negative  

 

 Urine Blood  Negative    Negative  

 

 Urine Nitrite  Negative    Negative  

 

 Urine Bilirubin  Negative    Negative  

 

 Urine Glucose  Negative    Negative  









 Laboratory test [place]  [place]  [facility] [place]  Stool Occult Blood  SEE RESULT BELOW
      16









 Stool Culture  SEE RESULT BELOW      17

 

 C Difficile PCR  SEE RESULT BELOW      18









 Laboratory test  [place]  [facility]  Blood Culture  SEE RESULT BELOW      19



 finding              

 

 Venous Blood Gas  2018  [facility]  Venous Blood pH  7.48  High  7.33-7.43  









 Venous Pco2  31 mmHg  Low  41-51  

 

 Venous Po2  42 mmHg  N  35-45  

 

 Venous O2 Saturation  85.5 %  High  70-80  

 

 Venous Blood Base Excess  0.3  N  0-4  20

 

 Venous Bicarbonate Hco3  24.8 mmol/L  N  24-28  









 Laboratory test  2018  [facility] of [place] Glucose  103 mg/dL  High  70-
100  21



 finding              

 

 Rapid Influenza A & B  2018  [facility]  Influenza A Molecular  NEGATIVE    
Negative  22



 Molecular              









 Influenza B Molecular  NEGATIVE    Negative  









 Laboratory test finding  2018  [facility]  Inr/Protime  0.99  N  0.77-1.02  

 

 CBC Auto Diff  2018  [facility]  White Blood Count  9.5 10^3/uL  N  3.5-10.8  









 Red Blood Count  4.54 10^6/uL  N  4.0-5.4  

 

 Hemoglobin  13.6 g/dL  N  12.0-16.0  

 

 Hematocrit  40 %  N  35-47  

 

 Mean Corpuscular Volume  88 fL  N  80-97  

 

 Mean Corpuscular Hemoglobin  30 pg  N  27-31  

 

 Mean Corpuscular HGB Conc  34 g/dL  N  31-36  

 

 Red Cell Distribution Width  14 %  N  10.5-15  

 

 Platelet Count  376 10^3/uL  N  150-450  

 

 Mean Platelet Volume  7 um3  Low  7.4-10.4  

 

 Abs Neutrophils  5.9 10^3/uL  N  1.5-7.7  

 

 Abs Lymphocytes  2.7 10^3/uL  N  1.0-4.8  

 

 Abs Monocytes  0.8 10^3/uL  N  0-0.8  

 

 Abs Eosinophils  0 10^3/uL  N  0-0.6  

 

 Abs Basophils  0.1 10^3/uL  N  0-0.2  

 

 Abs Nucleated RBC  0 10^3/uL      

 

 Granulocyte %  61.9 %  N  38-83  

 

 Lymphocyte %  28.2 %  N  25-47  

 

 Monocyte %  8.8 %  High  0-7  

 

 Eosinophil %  0.3 %  N  0-6  

 

 Basophil %  0.8 %  N  0-2  

 

 Nucleated Red Blood Cells %  0      









 Laboratory test finding  2018  [facility]  Lactic Acid  1.3 mmol/L  N  0.5-2.0  
23

 

 Comp Metabolic Panel  2018  [facility]  Sodium  137 mmol/L  N  133-145  









 Potassium  3.2 mmol/L  Low  3.5-5.0  

 

 Chloride  106 mmol/L  N  101-111  

 

 Co2 Carbon Dioxide  21 mmol/L  Low  22-32  

 

 Anion Gap  10 mmol/L  N  2-11  

 

 Glucose  102 mg/dL  High    

 

 Blood Urea Nitrogen  16 mg/dL  N  6-24  

 

 Creatinine  0.64 mg/dL  N  0.51-0.95  

 

 BUN/Creatinine Ratio  25.0  High  8-20  

 

 Calcium  9.8 mg/dL  N  8.6-10.3  

 

 Total Protein  7.9 g/dL  N  6.4-8.9  

 

 Albumin  4.1 g/dL  N  3.2-5.2  

 

 Globulin  3.8 g/dL  N  2-4  

 

 Albumin/Globulin Ratio  1.1  N  1-3  

 

 Total Bilirubin  0.60 mg/dL  N  0.2-1.0  

 

 Alkaline Phosphatase  67 U/L  N    

 

 Alt  31 U/L  N  7-52  

 

 Ast  20 U/L  N  13-39  

 

 Egfr Non-  99.0    >60  

 

 Egfr   127.4    >60  24









 Laboratory test [place]  [place]  [facility]  Amylase  54 U/L  N    









 Lipase  17 U/L  N  11.0-82.0  

 

 Creatine Kinase(CK)  30 U/L  N    

 

 C Reactive Protein  12.86 mg/L  High  < 5.00  25

 

 Magnesium  2.1 mg/dL  N  1.9-2.7  









 [place]inal[place]  [place]  [facility]  Urine Color  Yellow      









 Urine Appearance  Cloudy      

 

 Urine Specific Gravity  1.026  N  1.010-1.030  

 

 Urine pH  7.0  N  5-9  

 

 Urine Urobilinogen  Negative    Negative  

 

 Urine Ketones  1+  Abnormal  Negative  

 

 Urine Protein  1+(30 mg/dL)  Abnormal  Negative  

 

 Urine Leukocytes  Negative    Negative  

 

 Urine Blood  Negative    Negative  

 

 Urine Nitrite  Negative    Negative  

 

 Urine Bilirubin  Negative    Negative  

 

 Urine Glucose  Negative    Negative  

 

 Urine White Blood Cell  Trace(0-5/hpf)    Absent  

 

 Urine Red Blood Cell  Trace(0-2/hpf)    Absent  

 

 Urine Bacteria  Absent    Absent  

 

 Urine Squamous Epithelial Cell  Present  Abnormal  Absent  









 Laboratory test  [place]  [facility]  [place]rine Culture And  SEE RESULT      26



 finding      Sensitivities  BELOW      

 

 Metabolic Panel  2017  [facility]  Glucose, Serum  106 mg/dL  High  65-99  
27



 (14), Comprehensive    [address] [place] [address]          



     (607)-   -          









 BUN  10 mg/dL    6-24  

 

 Creatinine, Serum  0.61 mg/dL    0.57-1.00  

 

 eGFR If NonAfricn Am  108 mL/min/1.73    >59  

 

 eGFR If Africn Am  124 mL/min/1.73    >59  

 

 BUN/Creatinine Ratio  16    9-23  

 

 Sodium, Serum  143 mmol/L    134-144  

 

 Potassium, Serum  4.2 mmol/L    3.5-5.2  

 

 Chloride, Serum  103 mmol/L      

 

 Carbon Dioxide, Total  25 mmol/L    18-29  

 

 Calcium, Serum  9.2 mg/dL    8.7-10.2  

 

 Protein, Total, Serum  7.4 g/dL    6.0-8.5  

 

 Albumin, Serum  4.1 g/dL    3.5-5.5  

 

 Globulin, Total  3.3 g/dL    1.5-4.5  

 

 A/G Ratio  1.2    1.2-2.2  

 

 Bilirubin, Total  <0.2 mg/dL    0.0-1.2  

 

 Alkaline Phosphatase, S  80 IU/L      

 

 Ast (Sgot)  21 IU/L    0-40  

 

 Alt (SGPT)  18 IU/L    0-32  









 Lipid Panel  2017  [facility]  Cholesterol, Total  263 mg/dL  High  100-199
  



     [address] [place] [address]          



     (607)-   -          









 Triglycerides  124 mg/dL    0-149  

 

 HDL Cholesterol  65 mg/dL    >39  

 

 VLDL Cholesterol David  25 mg/dL    5-40  

 

 LDL Cholesterol Calc  173 mg/dL  High  0-99  

 

 Comment:  TNP      









 CBC With  2017  [facility]  WBC  6.2 x10E3/uL    3.4-10.8  



 Differential/Platelet    [address] [place] [address]          



     (607)-   -          









 RBC  4.33 x10E6/uL    3.77-5.28  

 

 Hemoglobin  12.8 g/dL    11.1-15.9  

 

 Hematocrit  39.0 %    34.0-46.6  

 

 MCV  90 fL    79-97  

 

 MCH  29.6 pg    26.6-33.0  

 

 MCHC  32.8 g/dL    31.5-35.7  

 

 RDW  14.3 %    12.3-15.4  

 

 Platelets  402 x10E3/uL  High  150-379  

 

 Neutrophils  58 %    Not Estab.  

 

 Lymphs  32 %    Not Estab.  

 

 Monocytes  8 %    Not Estab.  

 

 Eos  2 %    Not Estab.  

 

 Basos  0 %    Not Estab.  

 

 Immature Cells  [facility]      

 

 Neutrophils (Absolute)  3.6 x10E3/uL    1.4-7.0  

 

 Lymphs (Absolute)  2.0 x10E3/uL    0.7-3.1  

 

 Monocytes(Absolute)  0.5 x10E3/uL    0.1-0.9  

 

 Eos (Absolute)  0.1 x10E3/uL    0.0-0.4  

 

 Baso (Absolute)  0.0 x10E3/uL    0.0-0.2  

 

 Immature Granulocytes  0 %    Not Estab.  

 

 Immature Grans (Abs)  0.0 x10E3/uL    0.0-0.1  

 

 NRBC  [facility]      

 

 Hematology Comments:  [facility]      









 Laboratory test  2017  [facility]  Thyroxine (T4)  1.09    0.82-1.77  



 finding    [address] [place] [address]  Free, Direct,  ng/dL      



     [place] [address]  S        



     (607)-   -          

 

 Hemoglobin A1c  2017  [facility]  Hemoglobin A1c  5.6 %    4.8-5.6  28



     [address] [place] [address]          



     (607)-   -          

 

 Laboratory test  2017  [facility]  TSH  1.400    0.450-4.500  



 finding    [address] [place] [address]    uIU/mL      



     [place] [address]          



     (607)-   -          

 

 Laboratory test  2017  [facility]  Glucose Serum  106  High  
  



 finding    (607)-   -          

 

 CBC Auto Diff  10/19/2017  [facility]  White Blood  10.2  N  3.5-10.8  



       Count  10^3/uL      









 Red Blood Count  4.29 10^6/uL  N  4.0-5.4  

 

 Hemoglobin  12.9 g/dL  N  12.0-16.0  

 

 Hematocrit  38 %  N  35-47  

 

 Mean Corpuscular Volume  89 fL  N  80-97  

 

 Mean Corpuscular Hemoglobin  30 pg  N  27-31  

 

 Mean Corpuscular HGB Conc  34 g/dL  N  31-36  

 

 Red Cell Distribution Width  14 %  N  10.5-15  

 

 Platelet Count  414 10^3/uL  N  150-450  

 

 Mean Platelet Volume  7 um3  Low  7.4-10.4  

 

 Abs Neutrophils  7.5 10^3/uL  N  1.5-7.7  

 

 Abs Lymphocytes  1.8 10^3/uL  N  1.0-4.8  

 

 Abs Monocytes  0.9 10^3/uL  High  0-0.8  

 

 Abs Eosinophils  0 10^3/uL  N  0-0.6  

 

 Abs Basophils  0.1 10^3/uL  N  0-0.2  

 

 Abs Nucleated RBC  0 10^3/uL  N    

 

 Granulocyte %  73.2 %  N  38-83  

 

 Lymphocyte %  17.5 %  Low  25-47  

 

 Monocyte %  8.4 %  N  1-9  

 

 Eosinophil %  0.2 %  N  0-6  

 

 Basophil %  0.7 %  N  0-2  

 

 Nucleated Red Blood Cells %  0  N    









 Laboratory test finding  10/19/2017  [facility]  B-Type Natriuretic Peptide BNP  47 pg
/mL  N    29









 Lactic Acid  1.3 mmol/L  N  0.5-2.0  30









 Comp Metabolic Panel  10/19/2017  [facility]  Sodium  136 mmol/L  N  133-145  









 Potassium  3.5 mmol/L  N  3.5-5.0  

 

 Chloride  103 mmol/L  N  101-111  

 

 Co2 Carbon Dioxide  26 mmol/L  N  22-32  

 

 Anion Gap  7 mmol/L  N  2-11  

 

 Glucose  91 mg/dL  N    

 

 Blood Urea Nitrogen  15 mg/dL  N  6-24  

 

 Creatinine  0.62 mg/dL  N  0.51-0.95  

 

 BUN/Creatinine Ratio  24.2  High  8-20  

 

 Calcium  9.2 mg/dL  N  8.6-10.3  

 

 Total Protein  7.3 g/dL  N  6.4-8.9  

 

 Albumin  3.8 g/dL  N  3.2-5.2  

 

 Globulin  3.5 g/dL  N  2-4  

 

 Albumin/Globulin Ratio  1.1  N  1-3  

 

 Total Bilirubin  0.30 mg/dL  N  0.2-1.0  

 

 Alkaline Phosphatase  63 U/L  N    

 

 Alt  21 U/L  N  7-52  

 

 Ast  15 U/L  N  13-39  

 

 Egfr Non-  102.7  N  >60  

 

 Egfr   132.1  N  >60  31









 Laboratory test finding  10/19/2017  [facility]  Magnesium  2.1 mg/dL  N  1.9-2.7  









 Creatine Kinase(CK)  25 U/L  N    

 

 Troponin I  0.00 ng/mL  N  <0.04  









 CKMB  10/19/2017  [facility]  CKMB  ng/mL  0.7 ng/mL  N  0.6-6.3  

 

 Laboratory test finding  10[place]19/2017  [facility]  TSH (Thyroid Stim  3.10 mcIU/mL  N  
0.34-5.60  



       Horm)        









 Blood Culture  SEE RESULT BELOW      32









 CBC No Diff  10[place]03/2017  [facility]  White Blood Count  17.4 10^3/uL  High  3.5-10.8  









 Red Blood Count  4.29 10^6/uL  N  4.0-5.4  

 

 Hemoglobin  13.0 g/dL  N  12.0-16.0  

 

 Hematocrit  39 %  N  35-47  

 

 Mean Corpuscular Volume  90 fL  N  80-97  

 

 Mean Corpuscular Hemoglobin  30 pg  N  27-31  

 

 Mean Corpuscular HGB Conc  34 g/dL  N  31-36  

 

 Red Cell Distribution Width  14 %  N  10.5-15  

 

 Platelet Count  383 10^3/uL  N  150-450  

 

 Mean Platelet Volume  8 um3  N  7.4-10.4  









 Urinalysis Profile  10[place]03/2017  [facility]  Urine Color  Helen  N    









 Urine Appearance  Turbid  N    

 

 Urine Specific Gravity  1.028  N  1.010-1.030  

 

 Urine pH  5.0  N  5-9  

 

 Urine Urobilinogen  Negative  N  Negative  

 

 Urine Ketones  Trace  Abnormal  Negative  

 

 Urine Protein  2+(100 mg/dL)  Abnormal  Negative  

 

 Urine Leukocytes  Negative  N  Negative  

 

 Urine Blood  Negative  N  Negative  

 

 Urine Nitrite  Negative  N  Negative  

 

 Urine Bilirubin  Negative  N  Negative  

 

 Urine Glucose  Negative  N  Negative  

 

 Urine White Blood Cell  2+(11-20/hpf)  Abnormal  Absent  

 

 Urine Red Blood Cell  3+(>10/hpf)  Abnormal  Absent  

 

 Urine Bacteria  3+  Abnormal  Absent  

 

 Urine Squamous Epithelial Cell  Present  Abnormal  Absent  

 

 Urine Yeast  Present  Abnormal  Absent  









 Comp Metabolic Panel  10[place]03/2017  [facility]  Sodium  138 mmol/L  N  133-145  









 Potassium  4.3 mmol/L  N  3.5-5.0  

 

 Chloride  104 mmol/L  N  101-111  

 

 Co2 Carbon Dioxide  27 mmol/L  N  22-32  

 

 Anion Gap  7 mmol/L  N  2-11  

 

 Glucose  81 mg/dL  N    

 

 Blood Urea Nitrogen  11 mg/dL  N  6-24  

 

 Creatinine  0.64 mg/dL  N  0.51-0.95  

 

 BUN/Creatinine Ratio  17.2  N  8-20  

 

 Calcium  9.7 mg/dL  N  8.6-10.3  

 

 Total Protein  7.2 g/dL  N  6.4-8.9  

 

 Albumin  3.9 g/dL  N  3.2-5.2  

 

 Globulin  3.3 g/dL  N  2-4  

 

 Albumin/Globulin Ratio  1.2  N  1-3  

 

 Total Bilirubin  0.20 mg/dL  N  0.2-1.0  

 

 Alkaline Phosphatase  63 U/L  N    

 

 Alt  15 U/L  N  7-52  

 

 Ast  15 U/L  N  13-39  

 

 Egfr Non-  99.0  N  >60  

 

 Egfr   127.4  N  >60  33









 Lipid Profile (Trig/Chol/HDL)  10/03/2017  [facility]  Triglycerides  60 mg/dL  N    
34









 Cholesterol  228 mg/dL  N    35

 

 HDL Cholesterol  67.9 mg/dL  N    36

 

 LDL Cholesterol  148 mg/dL  N    37









 Laboratory test finding  10/03/2017  [facility]  Thyroxine  10.33 g/mL  N  6.09-
12.23  









 TSH (Thyroid Stim Horm)  0.75 mcIU/mL  N  0.34-5.60  









 Urine Microalbumin Random  10/03/2017  [facility]  Ur Microalbumin (mg/L)  82.3 mg/L  
N    









 Urine Creatinine  361.76 mg/dL  N    

 

 Urine Microalbumin/Creatinine  22.7 ug/mg  N  <31  









 Laboratory test finding  10/03/2017  [facility]  Hemoglobin A1c (Glyco  5.8 %  N  
Less than 6.0  38



       HGB)        









 Urine Culture And Sensitivities  SEE RESULT BELOW      39









 Ua - Non Micro ([facility])  2017  [facility]  Appearance  clear      



     (607)-   -          









 Color  yellow      

 

 Glucose, Urine ([facility])  neg      

 

 Bilirubin  neg      

 

 Ketones  neg      

 

 SP Grav  >=1.030      

 

 Blood  neg      

 

 PH  5.5      

 

 Protein  neg      

 

 Urobil  0.2      

 

 Nitrite  neg      

 

 Leukocytes ([facility])  neg      









 Urine Culture  2017  [facility]  Urine Culture,  Final report  Abnormal    
40, 41



 Routine    [address] [facility]  Routine        



     [place] [address]          



     (607)-   -          









 Result 1  Escherichia coli  Abnormal    42

 

 Antimicrobial Susceptibility  See Comment:      43









 [place]  [facility] [place]  < 15 g/mL  N    44

 

 [place]  [place]  [facility] [place] Blood Count  8.7 10^3/uL  N  3.5-10.8  









 Red Blood Count  4.46 10^6/uL  N  4.0-5.4  

 

 Hemoglobin  13.3 g/dL  N  12.0-16.0  

 

 Hematocrit  40 %  N  35-47  

 

 Mean Corpuscular Volume  90 fL  N  80-97  

 

 Mean Corpuscular Hemoglobin  30 pg  N  27-31  

 

 Mean Corpuscular HGB Conc  34 g/dL  N  31-36  

 

 Red Cell Distribution Width  14 %  N  10.5-15  

 

 Platelet Count  342 10^3/uL  N  150-450  

 

 Mean Platelet Volume  7 um3  Low  7.4-10.4  

 

 Abs Neutrophils  5.8 10^3/uL  N  1.5-7.7  

 

 Abs Lymphocytes  2.1 10^3/uL  N  1.0-4.8  

 

 Abs Monocytes  0.7 10^3/uL  N  0-0.8  

 

 Abs Eosinophils  0.1 10^3/uL  N  0-0.6  

 

 Abs Basophils  0.1 10^3/uL  N  0-0.2  

 

 Abs Nucleated RBC  0 10^3/uL  N    

 

 Granulocyte %  66.8 %  N  38-83  

 

 Lymphocyte %  24.0 %  Low  25-47  

 

 Monocyte %  7.9 %  N  1-9  

 

 Eosinophil %  0.7 %  N  0-6  

 

 Basophil %  0.6 %  N  0-2  

 

 Nucleated Red Blood Cells %  0  N    









 [place]  [facility] [place] Acid  1.1 mmol/L  N  0.5-2.0  
45

 

 [place]  [facility] [place]  135 mmol/L  N  133-145  









 Potassium  3.7 mmol/L  N  3.5-5.0  

 

 Chloride  99 mmol/L  Low  101-111  

 

 Co2 Carbon Dioxide  26 mmol/L  N  22-32  

 

 Anion Gap  10 mmol/L  N  2-11  

 

 Glucose  126 mg/dL  High    

 

 Blood Urea Nitrogen  13 mg/dL  N  6-24  

 

 Creatinine  0.74 mg/dL  N  0.51-0.95  

 

 BUN/Creatinine Ratio  17.6  N  8-20  

 

 Calcium  9.3 mg/dL  N  8.6-10.3  

 

 Total Protein  7.9 g/dL  N  6.4-8.9  

 

 Albumin  4.1 g/dL  N  3.2-5.2  

 

 Globulin  3.8 g/dL  N  2-4  

 

 Albumin/Globulin Ratio  1.1  N  1-3  

 

 Total Bilirubin  0.40 mg/dL  N  0.2-1.0  

 

 Alkaline Phosphatase  71 U/L  N    

 

 Alt  16 U/L  N  7-52  

 

 Ast  20 U/L  N  13-39  

 

 Egfr Non-  83.8  N  >60  

 

 Egfr   107.7  N  >60  46









 Laboratory test finding  2017  [facility]  Acetaminophen  < 15 g/mL  N    47









 Alcohol  < 10 mg/dL  N  <10  

 

 Salicylate  < 2.50 mg/dL  N  <30  









 Comp Metabolic Panel  03/10/2017  [facility]  Sodium  138 mmol/L  N  133-145  









 Potassium  4.1 mmol/L  N  3.5-5.0  

 

 Chloride  102 mmol/L  N  101-111  

 

 Co2 Carbon Dioxide  29 mmol/L  N  22-32  

 

 Anion Gap  7 mmol/L  N  2-11  

 

 Glucose  83 mg/dL  N    

 

 Blood Urea Nitrogen  14 mg/dL  N  6-24  

 

 Creatinine  0.66 mg/dL  N  0.51-0.95  

 

 BUN/Creatinine Ratio  21.2  High  8-20  

 

 Calcium  9.2 mg/dL  N  8.6-10.3  

 

 Total Protein  6.8 g/dL  N  6.4-8.9  

 

 Albumin  3.9 g/dL  N  3.2-5.2  

 

 Globulin  2.9 g/dL  N  2-4  

 

 Albumin/Globulin Ratio  1.3  N  1-3  

 

 Total Bilirubin  0.50 mg/dL  N  0.2-1.0  

 

 Alkaline Phosphatase  61 U/L  N    

 

 Alt  22 U/L  N  7-52  

 

 Ast  15 U/L  N  13-39  

 

 Egfr Non-  96.0  N  >60  

 

 Egfr   123.5  N  >60  48









 Connective Tissue Panel  03/10/2017  [facility]  Anti-Nuclear Antibody  0.6 U  N    49









 Cyclic Citrullinated Peptide  <15.6 U  N    50

 

 Interpretation  See Comment  N    51









 Laboratory test finding  03/10/2017  [facility]  Vitamin D, 1,25 Dihydroxy  78 pg/mL  
N  18-78  52

 

 Hla B27  03/10/2017  [facility]  Hla B27  Negative  N    53









 Hla B27 Interp  See Comment  N    54









 Laboratory test finding  03/10/2017  [facility]  Rheumatoid Factor  <15 IU/mL  N  <15
  55

 

 Anca AB Ser If  03/10/2017  [facility]  C-Anca  Negative  N  Negative  









 P-Anca  Negative  N  Negative  56









 CBC Auto Diff  03/10/2017  [facility]  White Blood Count  5.3 10^3/uL  N  3.5-10.8  









 Red Blood Count  4.55 10^6/uL  N  4.0-5.4  

 

 Hemoglobin  13.5 g/dL  N  12.0-16.0  

 

 Hematocrit  41 %  N  35-47  

 

 Mean Corpuscular Volume  90 fL  N  80-97  

 

 Mean Corpuscular Hemoglobin  30 pg  N  27-31  

 

 Mean Corpuscular HGB Conc  33 g/dL  N  31-36  

 

 Red Cell Distribution Width  14 %  N  10.5-15  

 

 Platelet Count  349 10^3/uL  N  150-450  

 

 Mean Platelet Volume  8 um3  N  7.4-10.4  

 

 Abs Neutrophils  3.2 10^3/uL  N  1.5-7.7  

 

 Abs Lymphocytes  1.5 10^3/uL  N  1.0-4.8  

 

 Abs Monocytes  0.5 10^3/uL  N  0-0.8  

 

 Abs Eosinophils  0.1 10^3/uL  N  0-0.6  

 

 Abs Basophils  0 10^3/uL  N  0-0.2  

 

 Abs Nucleated RBC  0 10^3/uL  N    

 

 Granulocyte %  59.6 %  N  38-83  

 

 Lymphocyte %  28.0 %  N  25-47  

 

 Monocyte %  10.2 %  High  1-9  

 

 Eosinophil %  1.7 %  N  0-6  

 

 Basophil %  0.5 %  N  0-2  

 

 Nucleated Red Blood Cells %  0  N    









 Laboratory test finding  03/10/2017  [facility]  Magnesium  2.0 mg/dL  N  1.9-2.7  57









 Creatine Kinase(CK)  31 U/L  N    58

 

 TSH (Thyroid Stim Horm)  1.86 mcIU/mL  N  0.34-5.60  59

 

 Thyroperoxidase AB  608.12 IU/mL  High  <9  60

 

 Folic Acid (Folate)  13.65 ng/mL  N  >3.99  61

 

 Vitamin B12  386 pg/mL  N  180-914  62

 

 Vitamin D Total 25(Oh)  20.0 ng/mL  Low  30-50  63

 

 Angiotensin Converting Enzyme  20 U/L  N  8 - 53  64

 

 Thyroglobulin AB  40 IU/mL  Abnormal  <4.0  65









 Laboratory test finding  2017  [facility]  Magnesium  1.9 mg/dL  N  1.9-2.7  









 Troponin I  0.00 ng/mL  N  <0.04  66









 [place]  [facility] [place] Blood Count  6.7 10^3/uL  N  3.5-10.8  









 Red Blood Count  4.66 10^6/uL  N  4.0-5.4  

 

 Hemoglobin  14.0 g/dL  N  12.0-16.0  

 

 Hematocrit  42 %  N  35-47  

 

 Mean Corpuscular Volume  89 fL  N  80-97  

 

 Mean Corpuscular Hemoglobin  30 pg  N  27-31  

 

 Mean Corpuscular HGB Conc  34 g/dL  N  31-36  

 

 Red Cell Distribution Width  14 %  N  10.5-15  

 

 Platelet Count  389 10^3/uL  N  150-450  

 

 Mean Platelet Volume  7 um3  Low  7.4-10.4  

 

 Abs Neutrophils  5.0 10^3/uL  N  1.5-7.7  

 

 Abs Lymphocytes  0.7 10^3/uL  Low  1.0-4.8  

 

 Abs Monocytes  0.9 10^3/uL  High  0-0.8  

 

 Abs Eosinophils  0.1 10^3/uL  N  0-0.6  

 

 Abs Basophils  0 10^3/uL  N  0-0.2  

 

 Abs Nucleated RBC  0 10^3/uL  N    

 

 Granulocyte %  75.5 %  N  38-83  

 

 Lymphocyte %  10.1 %  Low  25-47  

 

 Monocyte %  12.8 %  High  1-9  

 

 Eosinophil %  1.2 %  N  0-6  

 

 Basophil %  0.4 %  N  0-2  

 

 Nucleated Red Blood Cells %  0  N    









 Comp Metabolic Panel  2017  [facility]  Sodium  134 mmol/L  N  133-145  









 Chloride  103 mmol/L  N  101-111  

 

 Co2 Carbon Dioxide  24 mmol/L  N  22-32  

 

 Glucose  131 mg/dL  High    

 

 Blood Urea Nitrogen  9 mg/dL  N  6-24  

 

 Creatinine  0.65 mg/dL  N  0.51-0.95  

 

 BUN/Creatinine Ratio  13.8  N  8-20  

 

 Calcium  9.4 mg/dL  N  8.6-10.3  

 

 Total Protein  7.6 g/dL  N  6.4-8.9  

 

 Albumin  4.0 g/dL  N  3.2-5.2  

 

 Globulin  3.6 g/dL  N  2-4  

 

 Albumin/Globulin Ratio  1.1  N  1-3  

 

 Total Bilirubin  0.50 mg/dL  N  0.2-1.0  

 

 Alkaline Phosphatase  66 U/L  N    

 

 Alt  21 U/L  N  7-52  

 

 Egfr Non-  97.7  N  >60  

 

 Egfr   125.7  N  >60  67

 

 Ast  22 U/L  N  13-39  

 

 Potassium  3.6 mmol/L  N  3.5-5.0  

 

 Anion Gap  7 mmol/L  N  2-11  









 [place]  [facility] [place]  Potassium Redraw  3.7 mmol/L  N  3.5-
5.0  









 Ast Redraw  22 U/L  N  13-39  









 [place]  [facility] [place]  Sodium  135 mmol/L  N  133-145  









 Chloride  103 mmol/L  N  101-111  

 

 Co2 Carbon Dioxide  26 mmol/L  N  22-32  

 

 Glucose  96 mg/dL  N    

 

 Blood Urea Nitrogen  12 mg/dL  N  6-24  

 

 Creatinine  0.67 mg/dL  N  0.51-0.95  

 

 BUN/Creatinine Ratio  17.9  N  8-20  

 

 Calcium  9.3 mg/dL  N  8.6-10.3  

 

 Total Protein  7.4 g/dL  N  6.4-8.9  

 

 Albumin  3.9 g/dL  N  3.2-5.2  

 

 Globulin  3.5 g/dL  N  2-4  

 

 Albumin/Globulin Ratio  1.1  N  1-3  

 

 Total Bilirubin  0.50 mg/dL  N  0.2-1.0  

 

 Alkaline Phosphatase  56 U/L  N    

 

 Alt  20 U/L  N  7-52  

 

 Egfr Non-  94.3  N  >60  

 

 Egfr   121.3  N  >60  68

 

 Potassium  4.0 mmol/L  N  3.5-5.0  

 

 Anion Gap  6 mmol/L  N  2-11  

 

 Ast  23 U/L  N  13-39  









 [place]  [place]  [facility] [place]  White Blood Count  7.9 10^3/uL  N  3.5-10.8  









 Red Blood Count  4.36 10^6/uL  N  4.0-5.4  

 

 Hemoglobin  13.2 g/dL  N  12.0-16.0  

 

 Hematocrit  39 %  N  35-47  

 

 Mean Corpuscular Volume  89 fL  N  80-97  

 

 Mean Corpuscular Hemoglobin  30 pg  N  27-31  

 

 Mean Corpuscular HGB Conc  34 g/dL  N  31-36  

 

 Red Cell Distribution Width  14 %  N  10.5-15  

 

 Platelet Count  320 10^3/uL  N  150-450  

 

 Mean Platelet Volume  7 um3  Low  7.4-10.4  

 

 Abs Neutrophils  5.3 10^3/uL  N  1.5-7.7  

 

 Abs Lymphocytes  1.7 10^3/uL  N  1.0-4.8  

 

 Abs Monocytes  0.7 10^3/uL  N  0-0.8  

 

 Abs Eosinophils  0.1 10^3/uL  N  0-0.6  

 

 Abs Basophils  0.1 10^3/uL  N  0-0.2  

 

 Abs Nucleated RBC  0 10^3/uL  N    

 

 Granulocyte %  66.9 %  N  38-83  

 

 Lymphocyte %  22.2 %  Low  25-47  

 

 Monocyte %  9.0 %  N  1-9  

 

 Eosinophil %  1.1 %  N  0-6  

 

 Basophil %  0.8 %  N  0-2  

 

 Nucleated Red Blood Cells %  0  N    









 Laboratory test  2017  [facility]  Troponin I  0.01 ng/mL  N  <0.04  69



 finding              

 

 [place]  [facility] [place]  Lactic Acid  1.2 mmol/L  N  0.5-2.0  70



 finding              

 

 Laboratory test  10/20/2016  [facility]  Hemoglobin A1c  5.4 %    4.1-5.7
  



 finding    (607)-   -  (a)        









 Microalb, Random ([facility])  21.1 mg/L    0.5-37  









 Complete Blood Count  10/20/2016  [facility])  WBC  4.5 x10^3/UL    3.6
-9.6  









 RBC  4.35 x10^6/UL    3.90-5.70  

 

 HGB  13.5 g/dL    12.1-17.2  

 

 HCT  40 %    36-50  

 

 MCV  92.0 fL    82.2-97.4  

 

 MCH  31.0 pg    27.6-33.3  

 

 MCHC  33.7 g/dL    33.0-35.5  

 

 RDW  13.9 %  High  11.6-13.7  

 

 PLT  350 x10^3/UL    150-400  

 

 MPV  6.0 fL  Low  7.4-10.4  

 

 Gran #  2.7 x10^3/UL    1.5-7.2  

 

 Lymph#  1.6 x10^3/UL    0.7-4.9  

 

 Mono#  0.2 x10^3/UL    0.1-0.9  

 

 Gran %  58.3 %    42.2-75.2  

 

 Lymph %  37.1 %    20.5-51.1  

 

 Mono%  4.6 %    1.7-9.3  









 Laboratory test  10/20/2016  [facility](a)  TSH  2.15 mIU/L    0.50-6.00
  71



 finding              

 

 Ua - Non Micro  10/20/2016  [facility]  Appearance  yellow      



 ([facility])    (607)-   -          









 Color  clear      

 

 Glucose, Urine ([facility])  neg      

 

 Bilirubin  neg      

 

 Ketones  trace      

 

 SP Grav  1.015      

 

 Blood  neg      

 

 PH  7.5      

 

 Protein  neg      

 

 Urobil  0.2      

 

 Nitrite  neg      

 

 Leukocytes ([facility])  neg      









 Comprehensive Metabolic  10/20/2016  Tk [facility]pher[facility])  Sodium  139 mEq/L    
134-149  



 Prof              









 Potassium  4.1 mEq/L    3.6-5.5  

 

 Chloride  98 mEq/L      

 

 Carbon Dioxide  26 mEq/L    21-32  

 

 Glucose  90 mg/dL      

 

 BUN  13 mg/dL    6-26  

 

 Creatinine  0.7 mg/dL    0.6-1.4  

 

 BUN/Creat Ratio  18.6 CALC    8.0-36.0  

 

 Calcium  9.1 mg/dL    8.6-10.2  

 

 Total Protein  7.2 g/dL    6.4-8.3  

 

 Albumin  4.1 g/dL    3.8-5.5  

 

 Globulin  3.1 g/dL    2.0-4.8  

 

 A/G Ratio  1.3 CALC    0.6-2.3  

 

 Alk. Phosphatase  62 U/L      

 

 Alt (SGPT)  24 U/L    7-35  

 

 Ast (Sgot)  17 U/L    5-34  

 

 Total Bilirubin  0.3 mg/dL    0.2-1.3  

 

 GFR Non-African American  >60 ml/min/1.73m^    >=60  

 

 GFR African American  >60 ml/min/1.73m^    >=60  









 Lipid Profile  10/20/2016  [facility])  Cholesterol  218 mg/dL  High  
120-200  









 Triglycerides  46 mg/dL      

 

 HDL Cholesterol  70 mg/dL    30-85  

 

 LDL (Calculated)  139 CALC  High  0-129  

 

 VLDL Cholesterol  9 mg/dL    0-50  

 

 HDL Risk Factor  3.1 CALC    0.0-4.4  









 [place]  [place]  [facility] [place] Blood Count  7.5 10^3/uL  N  3.5-10.8  









 Red Blood Count  4.29 10^6/uL  N  4.0-5.4  

 

 Hemoglobin  12.8 g/dL  N  12.0-16.0  

 

 Hematocrit  38 %  N  35-47  

 

 Mean Corpuscular Volume  89 fL  N  80-97  

 

 Mean Corpuscular Hemoglobin  30 pg  N  27-31  

 

 Mean Corpuscular HGB Conc  34 g/dL  N  31-36  

 

 Red Cell Distribution Width  13 %  N  10.5-15  

 

 Platelet Count  354 10^3/uL  N  150-450  

 

 Mean Platelet Volume  7 um3  Low  7.4-10.4  

 

 Abs Neutrophils  5.3 10^3/uL  N  1.5-7.7  

 

 Abs Lymphocytes  1.4 10^3/uL  N  1.0-4.8  

 

 Abs Monocytes  0.7 10^3/uL  N  0-0.8  

 

 Abs Eosinophils  0.1 10^3/uL  N  0-0.6  

 

 Abs Basophils  0 10^3/uL  N  0-0.2  

 

 Abs Nucleated RBC  0 10^3/uL  N    

 

 Granulocyte %  70.5 %  N  38-83  

 

 Lymphocyte %  18.4 %  Low  25-47  

 

 Monocyte %  9.8 %  High  1-9  

 

 Eosinophil %  0.8 %  N  0-6  

 

 Basophil %  0.5 %  N  0-2  

 

 Nucleated Red Blood Cells %  0  N    









 Laboratory test finding  10/08/2016  [facility]  Lactic Acid  1.6 mmol/L  N  0.5-2.0  
72

 

 Comp Metabolic [place]  [place]  [facility] [place]odium  138 mmol/L  N  133-145  









 Potassium  3.1 mmol/L  Low  3.5-5.0  

 

 Chloride  105 mmol/L  N  101-111  

 

 Co2 Carbon Dioxide  24 mmol/L  N  22-32  

 

 Anion Gap  9 mmol/L  N  2-11  

 

 Glucose  123 mg/dL  High    

 

 Blood Urea Nitrogen  15 mg/dL  N  6-24  

 

 Creatinine  0.83 mg/dL  N  0.51-0.95  

 

 BUN/Creatinine Ratio  18.1  N  8-20  

 

 Calcium  9.5 mg/dL  N  8.6-10.3  

 

 Total Protein  7.2 g/dL  N  6.4-8.9  

 

 Albumin  3.8 g/dL  N  3.2-5.2  

 

 Globulin  3.4 g/dL  N  2-4  

 

 Albumin/Globulin Ratio  1.1  N  1-3  

 

 Total Bilirubin  0.40 mg/dL  N  0.2-1.0  

 

 Alkaline Phosphatase  56 U/L  N    

 

 Alt  16 U/L  N  7-52  

 

 Ast  17 U/L  N  13-39  

 

 Egfr Non-  73.7  N  >60  

 

 Egfr   94.8  N  >60  73









 Laboratory test finding  10/08/2016  [facility]  Troponin I  0.00 ng/mL  N  <0.03  74









 HCG Pregnancy  < 0.60 mIU/mL  N    75









 Laboratory test  2016  [facility]  Troponin I  0.00 ng/mL  N  <0.03  76



 finding              

 

 [place]  [facility] [place]  HIV 1&2 AB Self  Nonreactive  N  Nonreactive
  77



 finding      Referred        

 

 [place]  [place]  [facility] [place]  White Blood Count  8.2 10^3/uL  N  3.5-10.8  









 Red Blood Count  4.37 10^6/uL  N  4.0-5.4  

 

 Hemoglobin  13.3 g/dL  N  12.0-16.0  

 

 Hematocrit  40 %  N  35-47  

 

 Mean Corpuscular Volume  91 fL  N  80-97  

 

 Mean Corpuscular Hemoglobin  30 pg  N  27-31  

 

 Mean Corpuscular HGB Conc  34 g/dL  N  31-36  

 

 Red Cell Distribution Width  13 %  N  10.5-15  

 

 Platelet Count  293 10^3/uL  N  150-450  

 

 Mean Platelet Volume  7 um3  Low  7.4-10.4  

 

 Abs Neutrophils  5.0 10^3/uL  N  1.5-7.7  

 

 Abs Lymphocytes  2.5 10^3/uL  N  1.0-4.8  

 

 Abs Monocytes  0.6 10^3/uL  N  0-0.8  

 

 Abs Eosinophils  0.1 10^3/uL  N  0-0.6  

 

 Abs Basophils  0.1 10^3/uL  N  0-0.2  

 

 Abs Nucleated RBC  0 10^3/uL  N    

 

 Granulocyte %  60.5 %  N  38-83  

 

 Lymphocyte %  29.8 %  N  25-47  

 

 Monocyte %  7.8 %  N  1-9  

 

 Eosinophil %  1.2 %  N  0-6  

 

 Basophil %  0.7 %  N  0-2  

 

 Nucleated Red Blood Cells %  0  N    









 [place]  [facility] [place]  Sodium  135 mmol/L  N  133-145  









 Chloride  102 mmol/L  N  101-111  

 

 Co2 Carbon Dioxide  25 mmol/L  N  22-32  

 

 Glucose  139 mg/dL  High    

 

 Blood Urea Nitrogen  14 mg/dL  N  6-24  

 

 Creatinine  0.64 mg/dL  N  0.51-0.95  

 

 BUN/Creatinine Ratio  21.9  High  8-20  

 

 Calcium  9.1 mg/dL  N  8.6-10.3  

 

 Total Protein  7.0 g/dL  N  6.4-8.9  

 

 Albumin  3.6 g/dL  N  3.2-5.2  

 

 Globulin  3.4 g/dL  N  2-4  

 

 Albumin/Globulin Ratio  1.1  N  1-3  

 

 Total Bilirubin  0.30 mg/dL  N  0.2-1.0  

 

 Alkaline Phosphatase  54 U/L  N    

 

 Alt  17 U/L  N  7-52  

 

 Egfr Non-  99.5  N  >60  

 

 Egfr   127.9  N  >60  78

 

 Potassium  3.9 mmol/L  N  3.5-5.0  

 

 Anion Gap  8 mmol/L  N  2-11  

 

 Ast  16 U/L  N  13-39  









 1  It is recognized that currently available assays for the



   detection of antibodies to HIV-1 and/or HIV-2 may not detect



   all infected individuals. HIV antibodies may be undetectable



   in some stages of the infection and in some clinical



   conditions. The performance of this assay has not been



   established for populations of infants or children.



   



   Assayed by Chemiluminescence Microparticle Immunoassay on



   the Siemens Advia Centaur CP. Values obtained with different



   methods or kits cannot be used interchangeably.The



   diagnostic specificity of the ADVIA Centaur 1/O/2 Enhanced



   assay in the low risk population was 99.90% (6052/6058) with



   a 95% confidence interval of 99.78 to 99.96%.

 

 2  *******Because ethnic data is not always readily available,



   this report includes an eGFR for both -Americans and



   non- Americans.****



   The National Kidney Disease Education Program (NKDEP) does



   not endorse the use of the MDRD equation for patients that



   are not between the ages of 18 and 70, are pregnant, have



   extremes of body size, muscle mass, or nutritional status,



   or are non- or non-.



   According to the National Kidney Foundation, irrespective of



   diagnosis, the stage of the disease is based on the level of



   kidney function:



   Stage Description                      GFR(mL/min/1.73 m(2))



   1     Kidney damage with normal or decreased GFR       90



   2     Kidney damage with mild decrease in GFR          60-89



   3     Moderate decrease in GFR                         30-59



   4     Severe decrease in GFR                           15-29



   5     Kidney failure                       <15 (or dialysis)

 

 3  Troponin-I testing on Plasma Separator Tubes (PST) has a



   known false positive rate of 0.20-0.40%.  All positive



   troponins reflex immediate secondary confirmatory testing.

 

 4  Normal Range 180 to 914



   Indeterminate Range 145 to 180



   Deficient Range  <145

 

 5  Result TnIDx:1.63 Called to [id number] at: 15:08:36 by:[id number] Read back by:
[id number]



   



   



   ******CORRECTED REPORT******



   ---  Corrected on 18 1611 ---



   Troponin I previously reported as:



   1.63 *C ng/mL



   Result TnIDx:1.63 Called to [id number] at: 15:08:36 by:[id number] Read back by:
[id number]

 

 6  *******Because ethnic data is not always readily available,



   this report includes an eGFR for both -Americans and



   non- Americans.****



   The National Kidney Disease Education Program (NKDEP) does



   not endorse the use of the MDRD equation for patients that



   are not between the ages of 18 and 70, are pregnant, have



   extremes of body size, muscle mass, or nutritional status,



   or are non- or non-.



   According to the National Kidney Foundation, irrespective of



   diagnosis, the stage of the disease is based on the level of



   kidney function:



   Stage Description                      GFR(mL/min/1.73 m(2))



   1     Kidney damage with normal or decreased GFR       90



   2     Kidney damage with mild decrease in GFR          60-89



   3     Moderate decrease in GFR                         30-59



   4     Severe decrease in GFR                           15-29



   5     Kidney failure                       <15 (or dialysis)

 

 7  [facility] Severe Sepsis and Septic Shock Management Bundle Measure



   requires all lactic acids initially measuring >2.0 mmol/L be



   repeated.

 

 8  SRC:LOWER BACK RASH - RULE RULE OUT MRSA

 

 9  Source of Specimen: LOWER BACK RASH - RULE



   No anaerobic transport device received.

 

 10  Source of Specimen: LOWER BACK RASH - RULE



   Please note that the Microbiology test code was changed to reflect



   the specimen source or transport received.

 

 11  No anaerobic transport device received.



   TEST:  [id number]  Anaerobic Culture

 

 12  Source of Specimen: LOWER BACK RASH - RULE

 

 13  Source of Specimen: LOWER BACK RASH - RULE



   No growth in 36 - 48 hours.

 

 14  Source of Specimen: LOWER BACK RASH - RULE



   No Methicillin - resistant Staphylococcus aureus.

 

 15  NON-FASTING

 

 16  SEE RESULT BELOW



   -----------------------------------------------------------------------------
---------------



   Name:  MARIELOS SEVERINO               : 1970    Attend Dr: Cuca Blancas MD



   Acct:  [id number]  Unit: [id number]  AGE: 47            Location:  [facility]



   Re18                        SEX: F             Status:    REG ER



   -----------------------------------------------------------------------------
---------------



   



   SPEC: [id number]         JACOB:       [facility] DR: Cuca Blancas MD



   REQ:  [id number]              RECD:   



   STATUS: ONEIL MICHAEL DR: Lin Lima MD



   _



   SOURCE: STOOL          SPDESC:



   ORDERED:  Occult Bl, Diag, C. diff PCR, Fecal Lactoferr



   



   -----------------------------------------------------------------------------
---------------



   Procedure                         Result                         Reported   
        Site



   -----------------------------------------------------------------------------
---------------



   Stool Specimen Description  Final                                18-
1457      ML



   Stool Color                 Brown



   Stool Form                  Nonformed



   Stool Consistency           Mucoid



   



   C. difficile PCR  Final                                          18-
1704      ML



   Organism 1                     027 Presumptive NEGATIVE



   Organism 2                     Toxigenic C.diff NEGATIVE



   



   Fecal Lactoferrin (Stool WBC)  Final                             18-
1524      ML



   Fecal Lactoferrin           Negative by Immunoassay



   



   TEST LIMITATIONS:



   



   Assay detects elevated levels of lactoferrin released from



   fecal leukocytes as a marker of intestinal inflammation. The



   test may not be appropriate in immunocompromised persons.



   Fecal samples from breast fed infants should not be used



   with this assay.



   



   Stool Occult Blood (1)  Final                                    18-
1513      ML



   Stool Occult Blood          Negative



   



   -----------------------------------------------------------------------------
---------------



   * [facility] - Main Lab



   .



   



   



   



   



   



   



   



   ** END OF REPORT **



   



   DEPARTMENT OF PATHOLOGY,  [address] [place] [address]



   Phone # [phone]      Fax #[phone]



   David Naik M.D. Director     [facility] # [id number]

 

 17  SEE RESULT BELOW



   -----------------------------------------------------------------------------
---------------



   Name:  MARIELOS SEVERINO               : 1970    Attend Dr: Cuca Blancas MD



   Acct:  [id number]  Unit: [id number]  AGE: 47            Location:  [facility]



   Re18                        SEX: F             Status:    DEP ER



   -----------------------------------------------------------------------------
---------------



   



   SPEC: [id number]         JACOB:   18-    [facility] DR: Cuca Blancas MD



   REQ:  [id number]              RECD:   



   STATUS: ONEIL MICHAEL DR: Lin Lima MD



   _



   SOURCE: STOOL          SPDESC:



   ORDERED:  Stool Culture, O P: Giar/Crypt



   COMMENTS: Unable to Perform Shiga Toxin Testing.  Insufficient



   Growth of Enteric Bacteria.



   



   -----------------------------------------------------------------------------
---------------



   Procedure                         Result                         Reported   
        Site



   -----------------------------------------------------------------------------
---------------



   Stool Culture  Final                                             18-
1017      ML



   



   Result                      No growth of normal enteric vida



   No enteric pathogens isolated



   



   Testing for Salmonella, Shigella, Aeromonas, Plesiomonas,



   Yersinia and Campylobacter are included in a Stool Culture.



   



   Vibrio spp not routinely tested for in a stool culture.



   If testing is desired, please request specifically when



   placing test order.



   



   Sensitivities not routinely performed on stool isolates, as



   antibiotics may prolong the carriage rate of bacteria.



   Please contact the microbiology lab if sensitivities are



   required.



   



   Stool Specimen Description  Final                                18-
1456      ML



   Stool Color                 Brown



   Stool Form                  Nonformed



   Stool Consistency           Mucoid



   



   Shiga Toxin 1   2  Final                                         18-
1017      ML



   Test not performed



   



   O P: Giardia/Cryptospor Screen  Final                            18-
1131      ML



   



   Organism 1                     Neg Cryptosporidium/Giardia



   



   ** CONTINUED ON NEXT PAGE **



   



   DEPARTMENT OF PATHOLOGY,  [address] [place] [address]



   Phone # [phone]      Fax #[phone]



   David Naik M.D. Director     [facility] # [id number]



   



   



   



   -----------------------------------------------------------------------------
---------------



   Patient: MARIELOS SEVERINO                [id number]     (Continued)



   -----------------------------------------------------------------------------
---------------



   



   



   Specimen: [id number]    Collected:   Received: 
    (Continued)



   



   -----------------------------------------------------------------------------
---------------



   Procedure                         Result                         Reported   
        Site



   -----------------------------------------------------------------------------
---------------



   O P: Giardia/Cryptospor Screen  Final   (continued)              1131



   Giardia and cryptosporidium antigen testing performed by



   enzyme immunoassay.  If patient is immunocompromised or has



   traveled to or is from a developing country, a full ova and



   parasite exam with microscopic (OPMIC) is recommended.  All



   samples will be held one month in case full ova and parasite



   testing is requested.  Contact the Microbiology Department



   at [phone].



   



   TEST LIMITATIONS:



   As with all diagnostic procedures, the results obtained



   should be used in conjunction with other clinical



   information available the physician, including confirmation



   by another method.  Negative results can occur in samples



   containing antigen below lower limits of detection of the



   assay.  One negative specimen does not rule out the



   possibility of a parasitic infection. To improve detection



   it is recommended that three specimens be collected on



   separate days over a period of not more than seven



   days. The use of colonic washes, aspirates or other diluted



   sample types has not been established and could affect the



   performance of the assay. Stool samples contaminated with an



   oily or particulate base (eg. Barium, mineral oil etc.)



   could interfere with the test and are not recommended.



   



   -----------------------------------------------------------------------------
---------------



   * [facility] - Main Lab



   .



   



   



   



   



   



   



   



   



   



   



   



   ** END OF REPORT **



   



   DEPARTMENT OF PATHOLOGY,  [address] [place] [address]



   Phone # [phone]      Fax #[phone]



   David Naik M.D. Director     [facility] # [id number]

 

 18  SEE RESULT BELOW



   -----------------------------------------------------------------------------
---------------



   Name:  MARIELOS SEVERINO               : 1970    Attend Dr: Cuca Blancas MD



   Acct:  [id number]  Unit: [id number]  AGE: 47            Location:  [facility]



   Re18                        SEX: F             Status:    REG ER



   -----------------------------------------------------------------------------
---------------



   



   SPEC: [id number]         JACOB:       [facility] DR: Cuca Blancas MD



   REQ:  [id number]              RECD:   



   STATUS: RES              OTHR DR: Lin Lima MD



   _



   SOURCE: STOOL          SPDESC:



   ORDERED:  Occult Bl, RANDEE Recio PCR, Fecal Lactoferr



   



   -----------------------------------------------------------------------------
---------------



   Procedure                         Result                         Reported   
        Site



   -----------------------------------------------------------------------------
---------------



   Stool Specimen Description  Final                                18-
      ML



   Stool Color                 Brown



   Stool Form                  Nonformed



   Stool Consistency           Mucoid



   



   C. difficile PCR



   PENDING



   



   Fecal Lactoferrin (Stool WBC)



   PENDING



   



   Stool Occult Blood (1)



   PENDING



   



   -----------------------------------------------------------------------------
---------------



   * ML - Main Lab



   .



   



   



   



   



   



   



   



   



   



   



   



   



   



   



   



   



   ** END OF REPORT **



   



   DEPARTMENT OF PATHOLOGY,  [address] [place] [address]



   Phone # [phone]      Fax #[phone]



   David Naik M.D. Director     [facility] # [id number]

 

 19  SEE RESULT BELOW



   -----------------------------------------------------------------------------
---------------



   Name:  MAYCOLMARIELOS               : 1970    Attend Dr: Cuca Blancas MD



   Acct:  [id number]  Unit: [id number]  AGE: 47            Location:  [facility]



   Re18                        SEX: F             Status:    DEP ER



   -----------------------------------------------------------------------------
---------------



   



   SPEC: [id number]         JACOB:       SUBM DR: Cuca Blancas MD



   REQ:  [id number]              RECD:   



   STATUS: ONEIL MICHAEL DR: Lni Lima MD



   _



   SOURCE: BLOOD,VENO     SPDESC:



   ORDERED:  Blood Cult, MRSA/SA BC PCR



   



   -----------------------------------------------------------------------------
---------------



   Procedure                         Result                         Reported   
        Site



   -----------------------------------------------------------------------------
---------------



   Aerobic Culture Bottle  Final                                    18-
1601      ML



   No Growth Day 5



   



   Anaerobic Culture Bottle  Final                                  18-
0950      ML



   Anaerobic Btl Gram Stain    Gram Positive Cocci resembling Staph



   



   Organism 1                     STAPHYLOCOCCUS EPIDERMIDIS



   



   - Sensitivity not performed; probable contaminant.



   



   If further testing is required, please call microbiology



   laboratory.



   



   



   MRSA/S. aureus Blood Cult PCR  Final                             18-
2131      ML



   Organism 1                     MRSA NEGATIVE



   Organism 2                     S.AUREUS NEGATIVE



   



   -----------------------------------------------------------------------------
---------------



   * ML - Main Lab



   .



   



   



   



   



   



   



   



   



   



   



   



   



   ** END OF REPORT **



   



   DEPARTMENT OF PATHOLOGY,  [address] [place] [address]



   Phone # [phone]      Fax #[phone]



   David Naik M.D. Director     [facility] # [id number]

 

 20  Reference ranges based on room air.

 

 21  : [id number]

 

 22  : [id number]

 

 23  [facility] Severe Sepsis and Septic Shock Management Bundle Measure



   requires all lactic acids initially measuring >2.0 mmol/L be



   repeated.

 

 24  *******Because ethnic data is not always readily available,



   this report includes an eGFR for both -Americans and



   non- Americans.****



   The National Kidney Disease Education Program (NKDEP) does



   not endorse the use of the MDRD equation for patients that



   are not between the ages of 18 and 70, are pregnant, have



   extremes of body size, muscle mass, or nutritional status,



   or are non- or non-.



   According to the National Kidney Foundation, irrespective of



   diagnosis, the stage of the disease is based on the level of



   kidney function:



   Stage Description                      GFR(mL/min/1.73 m(2))



   1     Kidney damage with normal or decreased GFR       90



   2     Kidney damage with mild decrease in GFR          60-89



   3     Moderate decrease in GFR                         30-59



   4     Severe decrease in GFR                           15-29



   5     Kidney failure                       <15 (or dialysis)

 

 25  Acute inflammation:  >10.00

 

 26  SEE RESULT BELOW



   -----------------------------------------------------------------------------
---------------



   Name:  MAYCOLMARIELOS               : 1970    Attend Dr: Cuca Blancas MD



   Acct:  [id number]  Unit: [id number]  AGE: 47            Location:  [facility]



   Re18                        SEX: F             Status:    DEP ER



   -----------------------------------------------------------------------------
---------------



   



   SPEC: [id number]         JACOB:       SUBM DR: Cuca Blancas MD



   REQ:  [id number]              RECD:   



   STATUS: ONEIL MICHAEL DR: Lin Lima MD



   _



   SOURCE: URINE          SPDESC:



   ORDERED:  Urine Culture



   



   -----------------------------------------------------------------------------
---------------



   Procedure                         Result                         Reported   
        Site



   -----------------------------------------------------------------------------
---------------



   Urine Culture  Final                                             03/02/18-
1608      ML



   



   No growth of clinically significant organisms



   



   -----------------------------------------------------------------------------
---------------



   * ML - Main Lab



   .



   



   



   



   



   



   



   



   



   



   



   



   



   



   



   



   



   



   



   



   



   



   



   



   



   



   



   ** END OF REPORT **



   



   DEPARTMENT OF PATHOLOGY,  [address] [place] [address]



   Phone # [phone]      Fax #[phone]



   David Naik M.D. Director     [facility] # [id number]

 

 27  2 sst 1 lav

 

 28  Pre-diabetes: 5.7 - 6.4



   Diabetes: >6.4



   Glycemic control for adults with diabetes: <7.0

 

 29  >100 to <200 pg/mL: likely compensated congestive heart



   failure (CHF)



   200 to 400 pg/mL: likely moderate CHF



   >400 pg/mL: likely moderate to severe CHF

 

 30  [facility] Severe Sepsis and Septic Shock Management Bundle Measure



   requires all lactic acids initially measuring >2.0 mmol/L be



   repeated.

 

 31  *******Because ethnic data is not always readily available,



   this report includes an eGFR for both -Americans and



   non- Americans.****



   The National Kidney Disease Education Program (NKDEP) does



   not endorse the use of the MDRD equation for patients that



   are not between the ages of 18 and 70, are pregnant, have



   extremes of body size, muscle mass, or nutritional status,



   or are non- or non-.



   According to the National Kidney Foundation, irrespective of



   diagnosis, the stage of the disease is based on the level of



   kidney function:



   Stage Description                      GFR(mL/min/1.73 m(2))



   1     Kidney damage with normal or decreased GFR       90



   2     Kidney damage with mild decrease in GFR          60-89



   3     Moderate decrease in GFR                         30-59



   4     Severe decrease in GFR                           15-29



   5     Kidney failure                       <15 (or dialysis)

 

 32  SEE RESULT BELOW



   -----------------------------------------------------------------------------
---------------



   Name:  MARIELOS SEVERINO               : 1970    Attend Dr: Felipe Hendricks MD



   Acct:  [id number]  Unit: [id number]  AGE: 47            Location:  [place] [address]



   Reg:   10/19/17      Dis:  10/20/17    SEX: F             Status:    DIS Joshua



   -----------------------------------------------------------------------------
---------------



   



   SPEC: [id number]         JACOB:   10/19/    SUBM DR: Austin Romano MD



   REQ:  [id number]              RECD:   10/19/



   STATUS: ONEIL MICHAEL DR: Lin Lima MD



   _



   SOURCE: BLOOD,VENO     SPDESC:



   ORDERED:  Blood Cult



   



   -----------------------------------------------------------------------------
---------------



   Procedure                         Result                         Reported   
        Site



   -----------------------------------------------------------------------------
---------------



   Aerobic Culture Bottle  Final                                    10/24/17-
      ML



   No Growth Day 5



   



   Anaerobic Culture Bottle  Final                                  10/24/17-
1714      ML



   No Growth Day 5



   



   -----------------------------------------------------------------------------
---------------



   * ML - MAIN LAB ([facility])



   .



   



   



   



   



   



   



   



   



   



   



   



   



   



   



   



   



   



   



   



   



   



   



   



   



   ** END OF REPORT **



   



   * ML=Testing performed at Main Lab



   DEPARTMENT OF PATHOLOGY,  [address] [place] [address]



   Phone # [phone]      Fax #[phone]



   David Naik M.D. Director     [facility] # [id number]

 

 33  *******Because ethnic data is not always readily available,



   this report includes an eGFR for both -Americans and



   non- Americans.****



   The National Kidney Disease Education Program (NKDEP) does



   not endorse the use of the MDRD equation for patients that



   are not between the ages of 18 and 70, are pregnant, have



   extremes of body size, muscle mass, or nutritional status,



   or are non- or non-.



   According to the National Kidney Foundation, irrespective of



   diagnosis, the stage of the disease is based on the level of



   kidney function:



   Stage Description                      GFR(mL/min/1.73 m(2))



   1     Kidney damage with normal or decreased GFR       90



   2     Kidney damage with mild decrease in GFR          60-89



   3     Moderate decrease in GFR                         30-59



   4     Severe decrease in GFR                           15-29



   5     Kidney failure                       <15 (or dialysis)

 

 34  Desirable <150



   Borderline high 150-199



   High 200-499



   Very High >500

 

 35  Desirable <200



   Borderline high 200-239



   High >239

 

 36  Low <40



   Desirable: 40-60



   High: >60

 

 37  Desirable: <100 mg/dL



   Near Optimal: 100-129 mg/dL



   Borderline High: 130-159 mg/dL



   High: 160-189 mg/dL



   Very High: >189 mg/dL

 

 38  Therapeutic target for the treatment of diabetes



   [place] patients is <7% HBA1C, and in selective



   patients <6.0%.Please refer to American Diabetes



   Association Diabetic care guidelines for further



   information.

 

 39  SEE RESULT BELOW



   -----------------------------------------------------------------------------
---------------



   Name:  MARIELOS SEVERINO               : 1970    Attend Dr: Lin Lima MD



   Acct:  [id number]  Unit: [id number]  AGE: 47            Location:  [facility]



   Reg:   10/03/17                        SEX: F             Status:    REG REF



   -----------------------------------------------------------------------------
---------------



   



   SPEC: [id number]         JACOB:   10/03/    SUBM DR: Lin Lima MD



   REQ:  [id number]              RECD:   10/03/



   STATUS: COMP



   _



   SOURCE: URINE          SPDESC:



   ORDERED:  Urine Culture



   



   -----------------------------------------------------------------------------
---------------



   Procedure                         Result                         Reported   
        Site



   -----------------------------------------------------------------------------
---------------



   Urine Culture  Final                                             10/04/17-
1313      ML



   No Growth (<1,000 CFU/mL)



   



   -----------------------------------------------------------------------------
---------------



   * ML - MAIN LAB ([facility])



   .



   



   



   



   



   



   



   



   



   



   



   



   



   



   



   



   



   



   



   



   



   



   



   



   



   



   



   ** END OF REPORT **



   



   * ML=Testing performed at Main Lab



   DEPARTMENT OF PATHOLOGY,  [address] [place] [address]



   Phone # [phone]      Fax #[phone]



   David Naik M.D. Director     [facility] # [id number]

 

 40  SRC:<Blank> 1urine vacutai ner

 

 41  Source of Specimen: <Blank> 1urine vacutai

 

 42  Escherichia coli



   Source of Specimen: <Blank> 1urine vacutai



   25,000-50,000 colony forming units per mL

 

 43  Source of Specimen: <Blank> 1urine vacutai



   ** S=Susceptible; I=Intermediate; R=Resistant **



   P=Positive; N=Negative



   MICS are expressed in micrograms per mL



   Antibiotic                 RSLT#1    RSLT#2    RSLT#3    RSLT#4



   Amoxicillin/Clavulanic Acid    S



   Ampicillin                     S



   Cefepime                       S



   Ceftriaxone                    S



   Cefuroxime                     S



   Cephalothin                    I



   Ciprofloxacin                  S



   Ertapenem                      S



   Gentamicin                     S



   Imipenem                       S



   Levofloxacin                   S



   Nitrofurantoin                 S



   Piperacillin                   S



   Tetracycline                   S



   Tobramycin                     S



   Trimethoprim/Sulfa             R

 

 44  Therapeutic concentration: <50 ug/mL



   Toxic concentration: >120 ug/mL

 

 45  [place] Severe Sepsis and Septic Shock Management Bundle Measure



   requires all lactic acids initially measuring >2.0 mmol/L be



   repeated.

 

 46  *******Because ethnic data is not always readily available,



   this report includes an eGFR for both -Americans and



   non- Americans.****



   The National Kidney Disease Education Program (NKDEP) does



   not endorse the use of the MDRD equation for patients that



   are not between the ages of 18 and 70, are pregnant, have



   extremes of body size, muscle mass, or nutritional status,



   or are non- or non-.



   According to the National Kidney Foundation, irrespective of



   diagnosis, the stage of the disease is based on the level of



   kidney function:



   Stage Description                      GFR(mL/min/1.73 m(2))



   1     Kidney damage with normal or decreased GFR       90



   2     Kidney damage with mild decrease in GFR          60-89



   3     Moderate decrease in GFR                         30-59



   4     Severe decrease in GFR                           15-29



   5     Kidney failure                       <15 (or dialysis)

 

 47  Therapeutic concentration: <50 ug/mL



   Toxic concentration: >120 ug/mL

 

 48  *******Because ethnic data is not always readily available,



   this report includes an eGFR for both -Americans and



   non- Americans.****



   The National Kidney Disease Education Program (NKDEP) does



   not endorse the use of the MDRD equation for patients that



   are not between the ages of 18 and 70, are pregnant, have



   extremes of body size, muscle mass, or nutritional status,



   or are non- or non-.



   According to the National Kidney Foundation, irrespective of



   diagnosis, the stage of the disease is based on the level of



   kidney function:



   Stage Description                      GFR(mL/min/1.73 m(2))



   1     Kidney damage with normal or decreased GFR       90



   2     Kidney damage with mild decrease in GFR          60-89



   3     Moderate decrease in GFR                         30-59



   4     Severe decrease in GFR                           15-29



   5     Kidney failure                       <15 (or dialysis)

 

 49  -------------------REFERENCE VALUE--------------------------



   <=1.0 (Negative)

 

 50  -------------------REFERENCE VALUE--------------------------



   <20.0 (Negative)

 

 51  Tests for antibodies to dsDNA and KEY antigens are not



   performed automatically unless the MIGUEL result is > or=



   3.0 U.  Studies performed at [facility] indicate that



   positive MIGUEL results <3.0 U are rarely accompanied by



   positive second order tests.



   Test Performed by:



   [facility] [employer] - [address] [address]

 

 52  -------------------ADDITIONAL INFORMATION-------------------



   This test was developed and its performance characteristics



   determined by [facility] in a manner consistent with CLIA



   requirements. This test has not been cleared or approved by



   the U.S. Food and Drug Administration.



   Test Performed by:



   [facility] - [address] [address]

 

 53  -------------------REFERENCE VALUE--------------------------



   Not Applicable

 

 54  RESULT: HLA-B27 antigen was not detected.



   -------------------ADDITIONAL INFORMATION-------------------



   Method: Flow Cytometry



   Performing Laboratory CLIA# [id number]



   Test Performed by:



   [address] [address]

 

 55  Test Performed by:



   [address] [address]

 

 56  Negative for cANCA and pANCA patterns by immunofluorescence.



   -------------------ADDITIONAL INFORMATION-------------------



   This test was developed and its performance characteristics



   determined by [facility] in a manner consistent with CLIA



   requirements. This test has not been cleared or approved by



   the U.S. Food and Drug Administration.



   Test Performed by:



   [address] [address]

 

 57  Please check this week

 

 58  Please check this week

 

 59  Please check this week

 

 60  Please check this week

 

 61  Please check this week

 

 62  Normal Range 180 to 914



   Indeterminate Range 145 to 180



   Deficient Range  <145

 

 63  Please check this week

 

 64  Test Performed by:



   [address] [address]

 

 65  -------------------ADDITIONAL INFORMATION-------------------



   The thyroglobulin antibody testing method is an



   immunoenzymatic assay manufactured by [employer]



   and performed on the [employer] .



   Values obtained from different assay methods or kits



   may be different and cannot be used interchangeably.



   The results cannot be interpreted as absolute evidence



   for the presence or absence of malignant disease.



   Test Performed by:



   [address] [address]

 

 66  99th percentile=0.04 ng/mL



   



   Troponin results at [facility] and [facility] are not interchangeable.

 

 67  *******Because ethnic data is not always readily available,



   this report includes an eGFR for both -Americans and



   non- Americans.****



   The National Kidney Disease Education Program (NKDEP) does



   not endorse the use of the MDRD equation for patients that



   are not between the ages of 18 and 70, are pregnant, have



   extremes of body size, muscle mass, or nutritional status,



   or are non- or non-.



   According to the National Kidney Foundation, irrespective of



   diagnosis, the stage of the disease is based on the level of



   kidney function:



   Stage Description                      GFR(mL/min/1.73 m(2))



   1     Kidney damage with normal or decreased GFR       90



   2     Kidney damage with mild decrease in GFR          60-89



   3     Moderate decrease in GFR                         30-59



   4     Severe decrease in GFR                           15-29



   5     Kidney failure                       <15 (or dialysis)

 

 68  *******Because ethnic data is not always readily available,



   this report includes an eGFR for both -Americans and



   non- Americans.****



   The National Kidney Disease Education Program (NKDEP) does



   not endorse the use of the MDRD equation for patients that



   are not between the ages of 18 and 70, are pregnant, have



   extremes of body size, muscle mass, or nutritional status,



   or are non- or non-.



   According to the National Kidney Foundation, irrespective of



   diagnosis, the stage of the disease is based on the level of



   kidney function:



   Stage Description                      GFR(mL/min/1.73 m(2))



   1     Kidney damage with normal or decreased GFR       90



   2     Kidney damage with mild decrease in GFR          60-89



   3     Moderate decrease in GFR                         30-59



   4     Severe decrease in GFR                           15-29



   5     Kidney failure                       <15 (or dialysis)

 

 69  99th percentile=0.04 ng/mL



   



   Troponin results at [facility] and [facility] are not interchangeable.

 

 70  [facility] Severe Sepsis and Septic Shock Management Bundle Measure



   requires all lactic acids initially measuring >2.0 mmol/L be



   repeated.

 

 71  FASTING

 

 72  [facility] Severe Sepsis and Septic Shock Management Bundle Measure



   requires all lactic acids initially measuring >2.0 mmol/L be



   repeated.

 

 73  *******Because ethnic data is not always readily available,



   this report includes an eGFR for both -Americans and



   non- Americans.****



   The National Kidney Disease Education Program (NKDEP) does



   not endorse the use of the MDRD equation for patients that



   are not between the ages of 18 and 70, are pregnant, have



   extremes of body size, muscle mass, or nutritional status,



   or are non- or non-.



   According to the National Kidney Foundation, irrespective of



   diagnosis, the stage of the disease is based on the level of



   kidney function:



   Stage Description                      GFR(mL/min/1.73 m(2))



   1     Kidney damage with normal or decreased GFR       90



   2     Kidney damage with mild decrease in GFR          60-89



   3     Moderate decrease in GFR                         30-59



   4     Severe decrease in GFR                           15-29



   5     Kidney failure                       <15 (or dialysis)

 

 74  Reference Range and Interpretation:



   TnI (ng/mL)             Interpretation



   Less Than 0.03 ng/mL    Not supportive of diagnosis of MI



   0.03 - 0.50 ng/mL       Indeterminate: suggest serial



   studies if clinically indicated.



   Greater than 0.5 ng/mL  Consistent with diagnosis of MI

 

 75  <5.0 Negative



   



   5.0 - 25.0  Indeterminate (Repeat testing recommended after



   72 hours)



   >25.0  Positive



   



   Perimenopausal women can display HCG levels of up to 20



   mIU/mL

 

 76  Reference Range and Interpretation:



   TnI (ng/mL)             Interpretation



   Less Than 0.03 ng/mL    Not supportive of diagnosis of MI



   0.03 - 0.50 ng/mL       Indeterminate: suggest serial



   studies if clinically indicated.



   Greater than 0.5 ng/mL  Consistent with diagnosis of MI

 

 77  It is recognized that currently available assays for the



   detection of antibodies to HIV-1 and/or HIV-2 may not detect



   all infected individuals. HIV antibodies may be undetectable



   in some stages of the infection and in some clinical



   conditions. The performance of this assay has not been



   established for populations of infants or children.



   



   Assayed by Chemiluminescence Microparticle Immunoassay on



   the Siemens Advia Centaur CP. Values obtained with different



   methods or kits cannot be used interchangeably.The



   diagnostic specificity of the ADVIA Centaur 1/O/2 Enhanced



   assay in the low risk population was 99.90% (6052/6058) with



   a 95% confidence interval of 99.78 to 99.96%.

 

 78  *******Because ethnic data is not always readily available,



   this report includes an eGFR for both -Americans and



   non- Americans.****



   The National Kidney Disease Education Program (NKDEP) does



   not endorse the use of the MDRD equation for patients that



   are not between the ages of 18 and 70, are pregnant, have



   extremes of body size, muscle mass, or nutritional status,



   or are non- or non-.



   According to the [employer] Kid[employer], irrespective of



   diagnosis, the stage of the disease is based on the level of



   kidney function:



   Stage Description                      GFR(mL/min/1.73 m(2))



   1     Kidney damage with normal or decreased GFR       90



   2     Kidney damage with mild decrease in GFR          60-89



   3     Moderate decrease in GFR                         30-59



   4     Severe decrease in GFR                           15-29



   5     Kidney failure                       <15 (or dialysis)







Procedures







 Date  Code  Description  Status

 

 2019  20027  Finger Or Heel Stick  Completed

 

 2018  [id number]  Mammogram  Completed

 

 2017  59091  Finger Or Heel Stick  Completed

 

 2016  [id number]  Mammogram  Completed







Encounters







 Type  Date  Location  Provider  Dx  Diagnosis

 

 Office Visit  2019  [facility]  Adriana Porter,  B02.8  [place] with 
other



   12:30p    FNP    complications









 R35.0  Frequency of micturition

 

 N39.41  Urge incontinence

 

 M79.604  Pain in right leg

 

 E11.9  Type 2 diabetes mellitus without complications









 Office Visit  2019 11:30a  [facility]  Adriana  B02.8  [place] with 
other



       [place], FNP    complications









 R35.0  Frequency of micturition

 

 N39.41  Urge incontinence

 

 M79.604  Pain in right leg









 Office Visit  2019 12:40p  [facility]  Lin Lima M.D.  G47.62  
Sleep related leg



           cramps









 M79.7  Fibromyalgia

 

 E78.5  Hyperlipidemia, unspecified

 

 F33.1  Major depressive disorder, recurrent, moderate

 

 E11.9  Type 2 diabetes mellitus without complications

 

 I10  Essential (primary) hypertension

 

 G89.4  Chronic pain syndrome









 Office Visit  2018  2:40p  [facility]  Óscar CRAIN01.00  Acute 
maxillary



       MD Tian    sinusitis,



           unspecified

 

 Office Visit  10/24/2018  3:15p  [facility]  Anna Nisha, FNP  R21  Rash and 
other



           nonspecific skin



           eruption

 

 Office Visit  2018  6:00p  [facility]  Jade Olivares,  R25.2  Cramp 
and spasm



       M.D.    









 S00.11xD  Contusion of right eyelid and periocular area, subs encntr

 

 W01.198D  Fall same lev from slip/trip w strike agnst oth object, subs









 Office Visit  2018  2:20p  [facility]  Rafael López,  N39.0  
Urinary tract



       M.D.    infection, site



           not specified









 Z48.02  Encounter for removal of sutures

 

 S01.00xA  Unspecified open wound of scalp, initial encounter









 Office Visit  2018 11:20a  [facility]  Lin Lima,  E11.9  Type 2 
diabetes



       M.D.    mellitus without



           complications









 F33.1  Major depressive disorder, recurrent, moderate









 Office Visit  2018  4:40p  [facility]  Lin Lima,  E11.9  Type 
2 diabetes



       M.D.    mellitus without



           complications









 F33.1  Major depressive disorder, recurrent, moderate

 

 R19.7  Diarrhea, unspecified









 Office Visit  2018  4:30p  [facility]  Óscar Overton MD  R11.0  
Nausea









 R19.7  Diarrhea, unspecified









 Office Visit  2018  3:20p  [facility]  Lin Lima,  E11.9  Type 
2 diabetes



       M.D.    mellitus without



           complications









 F33.1  Major depressive disorder, recurrent, moderate

 

 R19.7  Diarrhea, unspecified









 Office Visit  2018 10:30a  [facility]  Lin Lima,  A09  Infectious



       M.D.    gastroenteritis and



           colitis, unspecified









 E11.9  Type 2 diabetes mellitus without complications

 

 F33.1  Major depressive disorder, recurrent, moderate









 Office Visit  2018 10:30a  [facility]  Chacha Martinez,  J06.9  Acute 
upper



       Afnp-C    respiratory



           infection,



           unspecified









 J45.901  Unspecified asthma with (acute) exacerbation









 Office Visit  2018 10:30a  [facility]  Lin Lima M.D.  M79.671  
Pain in right



           foot









 M54.5  Low back pain

 

 M79.7  Fibromyalgia









 Office Visit  2017  3:00p  [facility]  Lin Lima,  Z00.00  
Encntr for



       M.D.    general adult



           medical exam



           w/o abnormal



           findings









 E11.9  Type 2 diabetes mellitus without complications

 

 F33.1  Major depressive disorder, recurrent, moderate

 

 I10  Essential (primary) hypertension

 

 E78.5  Hyperlipidemia, unspecified

 

 Z12.31  Encntr screen mammogram for malignant neoplasm of breast

 

 M54.5  Low back pain









 Office Visit  2017  6:00p  [facility]  Lindsay Ramirez  R11.2  Nausea with



       Lee, NP    vomiting,



           unspecified









 R19.7  Diarrhea, unspecified

 

 E11.9  Type 2 diabetes mellitus without complications









 Office Visit  10/23/2017  7:20p  [facility]  Lin Lima,  F33.1  Major 
depressive



       M.D.    disorder,



           recurrent, moderate









 E11.9  Type 2 diabetes mellitus without complications

 

 I10  Essential (primary) hypertension

 

 E78.5  Hyperlipidemia, unspecified

 

 R07.9  Chest pain, unspecified









 Office Visit  10/02/2017  2:00p  [facility]  Lin Lima,  F33.1  Major 
depressive



       M.D.    disorder,



           recurrent, moderate









 M54.5  Low back pain

 

 E11.9  Type 2 diabetes mellitus without complications

 

 E04.2  Nontoxic multinodular goiter

 

 M25.549  Pain in joints of unspecified hand









 Office Visit  2017 11:00a  [facility]  Óscar LAI  N39.0  Urinary tract



       ADELE Riojas    infection, site not



           specified

 

 Office Visit  2017  7:40p  [facility]  Lin Lima,  F33.1  Major 
depressive



       M.D.    disorder,



           recurrent, moderate









 T14.91  Suicide attempt









 Office Visit  2017  4:40p  [facility]  Lin Lima,  M54.5  Low 
back pain



       M.D.    









 E11.9  Type 2 diabetes mellitus without complications

 

 F33.1  Major depressive disorder, recurrent, moderate

 

 E04.2  Nontoxic multinodular goiter









 Office Visit  2017  8:00a  [facility]  Lin Lima M.D.  R42  
Dizziness and



           giddiness









 J06.9  Acute upper respiratory infection, unspecified

 

 R11.10  Vomiting, unspecified









 Office Visit  2017 10:50a  [facility]  Lin Lima,  L94.9  
Localized



       M.D.    connective tissue



           disorder,



           unspecified









 I10  Essential (primary) hypertension

 

 M25.549  Pain in joints of unspecified hand









 Office Visit  2016 10:30a  [facility]  Lin Lima,  I10  
Essential (primary)



       M.D.    hypertension









 F33.1  Major depressive disorder, recurrent, moderate

 

 E11.9  Type 2 diabetes mellitus without complications

 

 M54.5  Low back pain









 Office Visit  10/17/2016  3:00p  [facility]  Anna Cameron,  J06.9  Acute upper



       FNP    respiratory



           infection,



           unspecified

 

 Office Visit  2016  2:10p  [facility]  Lin Lima,  E11.9  Type 
2 diabetes



       M.D.    mellitus without



           complications









 F33.1  Major depressive disorder, recurrent, moderate

 

 M54.5  Low back pain

 

 I10  Essential (primary) hypertension









 Office Visit  2016  3:45p  [facility]  Anna Cameron,  S62.606A  
Fracture of



       FNP    unsp phalanx of



           right little



           finger, init









 W10.8xxA  Fall (on) (from) other stairs and steps, initial encounter









 Office Visit  2016 10:00a  Gisele Wright  E11.9  Type 2 diabetes



     Office  ROSALINDA Porter    mellitus without



           complications









 F32.9  Major depressive disorder, single episode, unspecified

 

 M54.5  Low back pain







Plan of Treatment

Future Appointment(s):2019  9:20 am - Lin Lima M.D. at [facility]/
10/2019 - Adriana Porter, FNPR60.0 Localized edema

## 2019-06-03 NOTE — XMS REPORT
Continuity of Care Document (CCD)

 Created on:May 29, 2019



Patient:Marielos Dumont

Sex:Female

:1970

External Reference #:[id number]





Demographics







 Address  [address] [place] [address]

 

 Home Phone  [phone]

 

 Mobile Phone  [phone]

 

 Work Phone  [phone]

 

 Preferred Language  en

 

 Marital Status  Declined to Specify/Unknown

 

 [employer] Affiliation  Unknown

 

 Race  Unknown

 

 Additional Race(s)  Other Race

 

 Ethnic Group  Declined to Specify/Unknown









Author







 Name  Maxi Samson DO, MPH

 

 Address  [address] [place] [address], [address]



   Unavailable



   [place] [address]









Care Team Providers







 Name  Role  Phone

 

 Hafsa Gonzalez M.D.  Care Team Information   Unavailable

 

 Lin Lima M.D.  Primary Care Physician  Unavailable









Payers







 Date  Identification Numbers  Payment Provider  Subscriber

 

 Expires: 2018  Policy Number: [id number]  [facility]









 PayID: [id number]  [address] [address]









 [place] [address]









 Effective: 2017  Policy Number: [id number]  [facility]









 PayID: [id number]  [address]









 [place] [address]









 Effective: 2018  Policy Number: [id number]  Medicaid [facility]









 PayID: [id number]  [address] [place] [id number]









 Expires: 2017  Policy Number: [id number]  [facility]









 PayID: [id number]  [address] [address]









 [place] [address]









 Effective: 2017  Policy Number: [id number]  Medicaid [facility]









 Expires: 2017  PayID: [id number]  [address] [place] [id number]







Problems







 Active Problems  Provider  Date

 

 Type 2 diabetes mellitus  Maxi Samson DO, MPH  Onset: 2014







Family History







 Date  Family Member(s)  Observation  Comments

 

   Father   due to Unknown Causes  ()

 

   Mother  56  

 

   Children  3  

 

   Siblings  2  







Social History







 Type  Date  Description  Comments

 

 Birth Sex    Unknown  

 

 Marital Status    Single  

 

 Work Status    Not Currently Working  

 

 Tobacco Use  Start: Unknown  Never Smoked Cigarettes  

 

 ETOH Use    Denies alcohol use  

 

 Tobacco Use  Start: Unknown  Patient has never smoked  

 

 Smoking Status  Reviewed: 19  Patient has never smoked  







Allergies, Adverse Reactions, Alerts







 Active Allergies  Reaction  Severity  Comments  Date

 

 Ibuprofen      vomitting, stomach pain  2014







Medications







 Active Medications  SIG  Qnty  Indications  Ordering  Date



         Provider  

 

 Oxycodone HCL  si by mouth  120tabs    SamsonMaxi cope,  2015



            15mg  every 6 to 8      DO, MPH  



 Tablets  hours as        



   directed chronic        



   pain patient        

 

 Naproxen DR  prn      Unknown  



          500mg Tablets          



 DR Sotelo        Unknown  



      50mg Tablets          



           

 

 Singulair  1 po daily      Unknown  



        10mg Tablets          



           

 

 Venlafaxine HCL    30tabs    Unknown  



              225mg          



 Tablets          



           

 

 Lisinopril  1 by mouth daily      Unknown  



         5mg Tablets          



           

 

 Abilify  si by mouth      Unknown  



      2mg Tablets  every day as        



   directed        

 

 Reglan  1 by mouth every      Unknown  



     10mg Tablets  day        



           

 

 Hydroxyzine HCL  1 by mouth three  30tabs    Unknown  



              25mg  times a day        



 Tablets  every night        



           

 

 Methocarbamol  take one by      Unknown  



            500mg  mouth every 12        



 Tablets  hours as        



   directed chronic        



   pain.        

 

 Benadryl Allergy  1/2-1 by mouth      Unknown  



               25mg  three times a        



 Tablets  day as directed        



           

 

 Diazepam  si by mouth      Unknown  



       5mg Tablets  every 12 hours        



           









 History Medications









 Medrol  medication to be  21units    Maxi Samson,  04/10/2017 -



     4mg TBPK  taken as directed      DO, MPH  2017



           

 

 Medrol  medication to be  21units    Maxi Samson,  2016 -



     4mg TBPK  taken as directed      DO, MPH  2017



           

 

 Oxycodone HCL  si by mouth every  120tabs    Maxi Samson,  2014 -



            15mg  6 hours as directed      DO, MPH  2015



 Tablets  chronic pain patient        



           

 

 Oxycodone HCL  si -2 po q4-6h ud  135tabs    SamsonMaxi cope,  2014 -



            10mg        DO, MPH  2014



 Tablets          



           



   chronic pain patient        

 

 Oxycodone HCL  si-2 po q4-6h ud  180tabs    Samson, Maxi,  10/22/2014 -



            10mg        DO, MPH  2014



 Tablets          



           



   chronic pain patient        

 

 Oxymorphone  si po q6h ud  120tabs    Samson, Maxi,  2014 -



 Hydrochloride           chronic      DO, MPH  10/22/2014



            10mg  pain patient        



 Tablets          



           

 

 Oxymorphone  si po q6h ud  120tabs    Samson, Maxi,  2014 -



 Hydrochloride        DO, MPH  2014



            5mg Tablets          



            chronic        



   pain patient        

 

 Oxycodone HCL  si-2 po q4-6h ud  90tabs    Samson, Maxi,  2014 -



            5mg Tablets        DO, MPH  2014



           



           



   chronic pain patient        

 

 Oxycodone HCL  si-2 po q12h ud  90tabs    Samson, Maxi,  2014 -



            5mg Tablets        DO, MPH  2014



           



           



   chronic pain patient        

 

 Voltaren  apply 4grams to  2tubes    Samson, Tamms,  2014 -



       1% Gel  affected area four      DO, [facility]  2016



   times a day ud        

 

 Proair HFA  2 puffs every 4-6      Unknown   -



         108(90Base)  hrs prn        2019



 mcg/Act Aerosol          



           

 

 Valium  si po q8h ud  30tabs    Unknown   -



     5mg Tablets          02/15/2019



           



           



   chronic pain patient        

 

 Omeprazole  1 po qd  30caps    Unknown   -



         40mg Capsules          2015



           

 

 Clonazepam  si by mouth three  30tabs    Unknown   -



         0.5mg Tablets  times a day as        02/15/2019



   directed        

 

 Topamax  take 2 by mouth      Unknown   -



      25mg Tablets  every morning as        2018



   directed chronic        



   pain.        

 

 Docusate Sodium  si by mouth every      Unknown   -



              100mg  day as directed        2018



 Tablets          



           

 

 Gabapentin  si by mouth two  60tabs    Unknown   -



         600mg Tablets  times a day as        2019



   directed        

 

 Penicillin V Potassium  10 milliliters twice      Unknown   -



   a day for 10 days        02/15/2019



 250mg/5ML Solution Rec          



           







Vital Signs







 Date  Vital  Result  Comment

 

 2019  2:23pm  BP Systolic  128 mmHg  









 BP Diastolic  78 mmHg  

 

 Heart Rate  76 /min  

 

 Respiratory Rate  20 /min  

 

 Height  60 inches  5'0"

 

 Weight  184.00 lb  

 

 Pain Level  6  Pain at this time.

 

 Pain Level With Medicine  5  on average with meds

 

 Pain Level Without Medicine  9  without meds

 

 BMI (Body Mass Index)  35.9 kg/m2  









 2019  2:23pm  BP Systolic  144 mmHg  









 BP Diastolic  86 mmHg  

 

 Heart Rate  82 /min  

 

 Respiratory Rate  20 /min  

 

 Height  60 inches  5'0"

 

 Weight  178.00 lb  

 

 Pain Level  9  Pain at this time.

 

 Pain Level With Medicine  8  on average with meds

 

 Pain Level Without Medicine  10  10/10 without meds

 

 BMI (Body Mass Index)  34.8 kg/m2  









 2019  1:29pm  BP Systolic  136 mmHg  









 BP Diastolic  86 mmHg  

 

 Heart Rate  84 /min  

 

 Respiratory Rate  20 /min  

 

 Height  60 inches  5'0"

 

 Weight  172.00 lb  

 

 Pain Level  9  Pain at this time.

 

 Pain Level With Medicine  8  on average with meds

 

 Pain Level Without Medicine  10  10/10 without meds

 

 Pain Level After Procedure  7  

 

 BP Systolic Recheck  130 mmHg  Pulse: 80

 

 BP Diastolic Recheck  82 mmHg  Pulse: 80

 

 BMI (Body Mass Index)  33.6 kg/m2  









 2019  2:43pm  BP Systolic  130 mmHg  









 BP Diastolic  80 mmHg  

 

 Heart Rate  82 /min  

 

 Respiratory Rate  20 /min  

 

 Height  60 inches  5'0"

 

 Weight  169.00 lb  

 

 Pain Level  9  Pain at this time.

 

 Pain Level With Medicine  8  on average with meds

 

 Pain Level Without Medicine  10  10/10 without meds

 

 BMI (Body Mass Index)  33.0 kg/m2  









 02/15/2019  2:52pm  BP Systolic  138 mmHg  









 BP Diastolic  90 mmHg  

 

 Heart Rate  88 /min  

 

 Respiratory Rate  20 /min  

 

 Height  60 inches  5'0"

 

 Weight  169.00 lb  

 

 Pain Level  9  Pain at this time.

 

 Pain Level With Medicine  8  on average with meds

 

 Pain Level Without Medicine  10  10/10 without meds

 

 BMI (Body Mass Index)  33.0 kg/m2  









 2019  3:32pm  BP Systolic  144 mmHg  









 BP Diastolic  82 mmHg  

 

 Heart Rate  86 /min  

 

 Respiratory Rate  20 /min  

 

 Height  60 inches  5'0"

 

 Weight  169.00 lb  

 

 Pain Level  7  Pain at this time.

 

 Pain Level With Medicine  7  on average with meds

 

 Pain Level Without Medicine  10  10/10 without meds

 

 BMI (Body Mass Index)  33.0 kg/m2  









 2018 10:05am  BP Systolic  140 mmHg  









 BP Diastolic  86 mmHg  

 

 Heart Rate  82 /min  

 

 Respiratory Rate  20 /min  

 

 Height  60 inches  5'0"

 

 Weight  168.00 lb  

 

 Pain Level  8  Pain at this time.

 

 Pain Level With Medicine  6  on average with meds

 

 Pain Level Without Medicine  10  10/10 without meds

 

 BMI (Body Mass Index)  32.8 kg/m2  









 2018 11:10am  BP Systolic  136 mmHg  









 BP Diastolic  88 mmHg  

 

 Heart Rate  88 /min  

 

 Respiratory Rate  20 /min  

 

 Height  60 inches  5'0"

 

 Weight  172.00 lb  

 

 Pain Level  7  Pain at this time.

 

 Pain Level With Medicine  6  on average with meds

 

 Pain Level Without Medicine  10  10/10 without meds

 

 Pain Level After Procedure  5  

 

 BP Systolic Recheck  132 mmHg  Pulse: 84

 

 BP Diastolic Recheck  84 mmHg  Pulse: 84

 

 BMI (Body Mass Index)  33.6 kg/m2  









 10/30/2018  2:32pm  BP Systolic  122 mmHg  









 BP Diastolic  74 mmHg  

 

 Heart Rate  78 /min  

 

 Respiratory Rate  18 /min  

 

 Height  60 inches  5'0"

 

 Weight  157.00 lb  

 

 Pain Level  7  Pain at this time.

 

 Pain Level With Medicine  6  on average with meds

 

 Pain Level Without Medicine  10  10/10 without meds

 

 BMI (Body Mass Index)  30.7 kg/m2  









 10/01/2018  1:51pm  BP Systolic  132 mmHg  









 BP Diastolic  84 mmHg  

 

 Heart Rate  80 /min  

 

 Respiratory Rate  20 /min  

 

 Height  60 inches  5'0"

 

 Weight  157.00 lb  

 

 Pain Level  8  Pain at this time.

 

 Pain Level With Medicine  7  on average with meds

 

 Pain Level Without Medicine  10  10/10 without meds

 

 BMI (Body Mass Index)  30.7 kg/m2  









 2018 11:25am  BP Systolic  138 mmHg  









 BP Diastolic  86 mmHg  

 

 Heart Rate  84 /min  

 

 Respiratory Rate  20 /min  

 

 Height  60 inches  5'0"

 

 Weight  162.00 lb  

 

 Pain Level  7  Pain at this time.

 

 Pain Level With Medicine  6  on average with meds

 

 Pain Level Without Medicine  10  10/10 without meds

 

 Pain Level After Procedure  7  

 

 BP Systolic Recheck  136 mmHg  Pulse: 84

 

 BP Diastolic Recheck  82 mmHg  Pulse: 84

 

 BMI (Body Mass Index)  31.6 kg/m2  









 2018 10:33am  BP Systolic  148 mmHg  









 BP Diastolic  78 mmHg  

 

 Heart Rate  72 /min  

 

 Respiratory Rate  20 /min  

 

 Height  60 inches  5'0"

 

 Weight  160.00 lb  

 

 Pain Level  9  Pain at this time.

 

 Pain Level With Medicine  8  on average with meds

 

 Pain Level Without Medicine  10  10/10 without meds

 

 BMI (Body Mass Index)  31.2 kg/m2  









 2018 10:57am  BP Systolic  140 mmHg  









 BP Diastolic  82 mmHg  

 

 Heart Rate  78 /min  

 

 Respiratory Rate  20 /min  

 

 Height  60 inches  5'0"

 

 Weight  158.00 lb  

 

 Pain Level  7  Pain at this time.

 

 Pain Level With Medicine  6  on average with meds

 

 Pain Level Without Medicine  10  10/10 without meds

 

 Pain Level After Procedure  5  

 

 BP Systolic Recheck  136 mmHg  Pulse: 82

 

 BP Diastolic Recheck  84 mmHg  Pulse: 82

 

 BMI (Body Mass Index)  30.9 kg/m2  









 2018  9:39am  BP Systolic  142 mmHg  









 BP Diastolic  86 mmHg  

 

 Heart Rate  80 /min  

 

 Respiratory Rate  20 /min  

 

 Height  60 inches  5'0"

 

 Weight  162.00 lb  

 

 Pain Level  8  Pain at this time.

 

 Pain Level With Medicine  7  on average with meds

 

 Pain Level Without Medicine  10  10/10 without meds

 

 BMI (Body Mass Index)  31.6 kg/m2  









 2018  9:26am  BP Systolic  140 mmHg  









 BP Diastolic  86 mmHg  

 

 Heart Rate  84 /min  

 

 Respiratory Rate  20 /min  

 

 Height  60 inches  5'0"

 

 Weight  162.00 lb  

 

 Pain Level  7  Pain at this time.

 

 Pain Level With Medicine  6  on average with meds

 

 Pain Level Without Medicine  10  10/10 without meds

 

 BMI (Body Mass Index)  31.6 kg/m2  









 2018 10:46am  BP Systolic  144 mmHg  









 BP Diastolic  86 mmHg  

 

 Heart Rate  82 /min  

 

 Respiratory Rate  20 /min  

 

 Height  60 inches  5'0"

 

 Weight  161.00 lb  

 

 Pain Level  6  Pain at this time.

 

 Pain Level With Medicine  5  on average with meds

 

 Pain Level Without Medicine  10  10/10 without meds

 

 BMI (Body Mass Index)  31.4 kg/m2  









 2018 11:24am  BP Systolic  128 mmHg  









 BP Diastolic  78 mmHg  

 

 Heart Rate  76 /min  

 

 Respiratory Rate  20 /min  

 

 Height  60 inches  5'0"

 

 Weight  161.00 lb  

 

 Pain Level  7  Pain at this time.

 

 Pain Level With Medicine  6  on average with meds

 

 Pain Level Without Medicine  10  10/10 without meds

 

 BMI (Body Mass Index)  31.4 kg/m2  









 2018  9:55am  BP Systolic  140 mmHg  









 BP Diastolic  86 mmHg  

 

 Heart Rate  76 /min  

 

 Respiratory Rate  20 /min  

 

 Height  60 inches  5'0"

 

 Weight  170.00 lb  

 

 Pain Level  9  Pain at this time.

 

 Pain Level With Medicine  8  on average with meds

 

 Pain Level Without Medicine  10  10/10 without meds

 

 Pain Level After Procedure  6  

 

 BP Systolic Recheck  136 mmHg  Pulse: 80

 

 BP Diastolic Recheck  82 mmHg  Pulse: 80

 

 BMI (Body Mass Index)  33.2 kg/m2  









 2018  9:35am  BP Systolic  136 mmHg  









 BP Diastolic  80 mmHg  

 

 Heart Rate  82 /min  

 

 Respiratory Rate  20 /min  

 

 Height  60 inches  5'0"

 

 Weight  162.00 lb  

 

 Pain Level  8  Pain at this time.

 

 Pain Level With Medicine  7  on average with meds

 

 Pain Level Without Medicine  10  10/10 without meds

 

 BMI (Body Mass Index)  31.6 kg/m2  









 01/15/2018  3:20pm  BP Systolic  152 mmHg  









 BP Diastolic  86 mmHg  

 

 Heart Rate  92 /min  

 

 Respiratory Rate  20 /min  

 

 Height  60 inches  5'0"

 

 Weight  162.00 lb  

 

 Pain Level  7  Pain at this time.

 

 Pain Level With Medicine  6  on average with meds

 

 Pain Level Without Medicine  10  10/10 without meds

 

 BMI (Body Mass Index)  31.6 kg/m2  









 2018  3:23pm  BP Systolic  138 mmHg  









 BP Diastolic  82 mmHg  

 

 Heart Rate  84 /min  

 

 Respiratory Rate  20 /min  

 

 Height  60 inches  5'0"

 

 Weight  166.00 lb  

 

 Pain Level  8  Pain at this time.

 

 Pain Level With Medicine  7  on average with meds

 

 Pain Level Without Medicine  10  10/10 without meds

 

 BMI (Body Mass Index)  32.4 kg/m2  









 2017 11:19am  BP Systolic  126 mmHg  









 BP Diastolic  74 mmHg  

 

 Heart Rate  80 /min  

 

 Respiratory Rate  20 /min  

 

 Height  60 inches  5'0"

 

 Weight  166.00 lb  

 

 Pain Level  9  Pain at this time.

 

 Pain Level With Medicine  8  on average with meds

 

 Pain Level Without Medicine  10  10/10 without meds

 

 BMI (Body Mass Index)  32.4 kg/m2  









 2017  2:57pm  BP Systolic  140 mmHg  









 BP Diastolic  86 mmHg  

 

 Heart Rate  84 /min  

 

 Respiratory Rate  20 /min  

 

 Height  60 inches  5'0"

 

 Weight  166.00 lb  

 

 Pain Level  3  Pain at this time.

 

 Pain Level With Medicine  3  on average with meds

 

 Pain Level Without Medicine  10  10/10 without meds

 

 BMI (Body Mass Index)  32.4 kg/m2  









 10/16/2017  9:44am  BP Systolic  166 mmHg  









 BP Diastolic  92 mmHg  

 

 Heart Rate  84 /min  

 

 Respiratory Rate  20 /min  

 

 Height  60 inches  5'0"

 

 Weight  169.00 lb  

 

 Pain Level  9  Pain at this time.

 

 Pain Level With Medicine  9  on average with meds

 

 Pain Level Without Medicine  10  10/10 without meds

 

 Pain Level After Procedure  4  

 

 BP Systolic Recheck  128 mmHg  Pulse: 80

 

 BP Diastolic Recheck  84 mmHg  Pulse: 80

 

 BMI (Body Mass Index)  33.0 kg/m2  









 10/02/2017 10:49am  BP Systolic  148 mmHg  









 BP Diastolic  80 mmHg  

 

 Heart Rate  84 /min  

 

 Respiratory Rate  20 /min  

 

 Height  60 inches  5'0"

 

 Weight  170.00 lb  

 

 Pain Level  9  Pain at this time.

 

 Pain Level With Medicine  8  on average with meds

 

 Pain Level Without Medicine  10  10/10 without meds

 

 BMI (Body Mass Index)  33.2 kg/m2  









 2017  3:54pm  BP Systolic  130 mmHg  









 BP Diastolic  80 mmHg  

 

 Heart Rate  84 /min  

 

 Respiratory Rate  18 /min  

 

 Height  60 inches  5'0"

 

 Weight  170.00 lb  

 

 Pain Level  9  Pain at this time.

 

 Pain Level With Medicine  7  on average with meds

 

 Pain Level Without Medicine  10  10/10 without meds

 

 BMI (Body Mass Index)  33.2 kg/m2  









 08/10/2017  3:57pm  BP Systolic  148 mmHg  









 BP Diastolic  86 mmHg  

 

 Heart Rate  94 /min  

 

 Respiratory Rate  20 /min  

 

 Height  60 inches  5'0"

 

 Weight  163.00 lb  

 

 Pain Level  8  Pain at this time.

 

 Pain Level With Medicine  7  on average with meds

 

 Pain Level Without Medicine  10  10/10 without meds

 

 Pain Level After Procedure  5  

 

 BP Systolic Recheck  158 mmHg  Pulse:112

 

 BP Diastolic Recheck  90 mmHg  Pulse:112

 

 BMI (Body Mass Index)  31.8 kg/m2  









 2017 10:20am  BP Systolic  136 mmHg  









 BP Diastolic  84 mmHg  

 

 Heart Rate  82 /min  

 

 Respiratory Rate  20 /min  

 

 Height  60 inches  5'0"

 

 Weight  163.00 lb  

 

 Pain Level  8  Pain at this time.

 

 Pain Level With Medicine  7  on average with meds

 

 Pain Level Without Medicine  10  10/10 without meds

 

 BMI (Body Mass Index)  31.8 kg/m2  









 2017  1:40pm  BP Systolic  152 mmHg  









 BP Diastolic  86 mmHg  

 

 Heart Rate  84 /min  

 

 Respiratory Rate  20 /min  

 

 Height  60 inches  5'0"

 

 Weight  160.00 lb  

 

 Pain Level  9  Pain at this time.

 

 Pain Level With Medicine  7  on average with meds

 

 Pain Level Without Medicine  10  10/10 without meds

 

 Pain Level After Procedure  6  

 

 BP Systolic Recheck  126 mmHg  Pulse: 88

 

 BP Diastolic Recheck  82 mmHg  Pulse: 88

 

 BMI (Body Mass Index)  31.2 kg/m2  









 2017  2:45pm  BP Systolic  140 mmHg  









 BP Diastolic  86 mmHg  

 

 Heart Rate  84 /min  

 

 Respiratory Rate  20 /min  

 

 Height  60 inches  5'0"

 

 Weight  160.00 lb  

 

 Pain Level  9  Pain at this time.

 

 Pain Level With Medicine  7  on average with meds

 

 Pain Level Without Medicine  10  10/10 without meds

 

 BMI (Body Mass Index)  31.2 kg/m2  









 2017  3:28pm  BP Systolic  116 mmHg  









 BP Diastolic  72 mmHg  

 

 Heart Rate  74 /min  

 

 Respiratory Rate  20 /min  

 

 Height  60 inches  5'0"

 

 Weight  160.00 lb  

 

 Pain Level  7  Pain at this time.

 

 Pain Level With Medicine  6  on average with meds

 

 Pain Level Without Medicine  10  10/10 without meds

 

 BMI (Body Mass Index)  31.2 kg/m2  









 2017  3:15pm  BP Systolic  128 mmHg  









 BP Diastolic  76 mmHg  

 

 Heart Rate  80 /min  

 

 Respiratory Rate  18 /min  

 

 Height  60 inches  5'0"

 

 Weight  161.00 lb  

 

 Pain Level  8  Pain at this time.

 

 Pain Level With Medicine  7  on average with meds

 

 Pain Level Without Medicine  10  10/10 without meds

 

 BMI (Body Mass Index)  31.4 kg/m2  









 2017  3:51pm  BP Systolic  130 mmHg  









 BP Diastolic  82 mmHg  

 

 Heart Rate  74 /min  

 

 Respiratory Rate  16 /min  

 

 Height  60 inches  5'0"

 

 Weight  164.00 lb  

 

 Pain Level  9  /10, Pain at this time.

 

 Pain Level With Medicine  9  9/10, on average with meds

 

 Pain Level Without Medicine  10  10/10 without meds

 

 BMI (Body Mass Index)  32.0 kg/m2  









 2017  3:17pm  BP Systolic  148 mmHg  









 BP Diastolic  86 mmHg  

 

 Heart Rate  84 /min  

 

 Respiratory Rate  20 /min  

 

 Height  60 inches  5'0"

 

 Weight  160.00 lb  

 

 Pain Level  8  Pain at this time.

 

 Pain Level With Medicine  8  on average with meds

 

 Pain Level Without Medicine  10  10/10 without meds

 

 BMI (Body Mass Index)  31.2 kg/m2  









 2017  2:12pm  BP Systolic  126 mmHg  









 BP Diastolic  80 mmHg  

 

 Heart Rate  84 /min  

 

 Respiratory Rate  18 /min  

 

 Height  60 inches  5'0"

 

 Weight  163.00 lb  

 

 Pain Level  8  Pain at this time.

 

 Pain Level With Medicine  7  on average with meds

 

 Pain Level Without Medicine  10  10/10 without meds

 

 BMI (Body Mass Index)  31.8 kg/m2  









 2017 10:33am  BP Systolic  138 mmHg  









 BP Diastolic  76 mmHg  

 

 Heart Rate  80 /min  

 

 Respiratory Rate  16 /min  

 

 Height  60 inches  5'0"

 

 Weight  156.00 lb  

 

 Pain Level  9  9/10, Pain at this time.

 

 Pain Level With Medicine  4  4/10, on average with meds

 

 Pain Level Without Medicine  10  10/10 without meds

 

 BMI (Body Mass Index)  30.5 kg/m2  









 2017  3:17pm  BP Systolic  124 mmHg  









 BP Diastolic  80 mmHg  

 

 Heart Rate  76 /min  

 

 Respiratory Rate  16 /min  

 

 Height  60 inches  5'0"

 

 Weight  156.00 lb  

 

 Pain Level  8  8/10, Pain at this time.

 

 Pain Level With Medicine  7  7/10, on average with meds

 

 Pain Level Without Medicine  10  10/10 without meds

 

 BMI (Body Mass Index)  30.5 kg/m2  









 2017  3:42pm  BP Systolic  144 mmHg  









 BP Diastolic  82 mmHg  

 

 Heart Rate  74 /min  

 

 Respiratory Rate  20 /min  

 

 Height  60 inches  5'0"

 

 Weight  156.00 lb  

 

 Pain Level  9  Pain at this time.

 

 Pain Level With Medicine  8  on average with meds

 

 Pain Level Without Medicine  10  10/10 without meds

 

 BMI (Body Mass Index)  30.5 kg/m2  









 2016  3:55pm  BP Systolic  122 mmHg  









 BP Diastolic  76 mmHg  

 

 Heart Rate  76 /min  

 

 Respiratory Rate  18 /min  

 

 Height  60 inches  5'0"

 

 Weight  155.00 lb  

 

 Pain Level  8  8/10, Pain at this time.

 

 Pain Level With Medicine  5  5/10, on average with meds

 

 Pain Level Without Medicine  10  10/10 without meds

 

 BMI (Body Mass Index)  30.3 kg/m2  









 2016  3:50pm  BP Systolic  132 mmHg  









 BP Diastolic  84 mmHg  

 

 Heart Rate  80 /min  

 

 Respiratory Rate  18 /min  

 

 Height  60 inches  5'0"

 

 Weight  151.00 lb  

 

 Pain Level  7  Pain at this time.

 

 Pain Level With Medicine  6  on average with meds

 

 Pain Level Without Medicine  10  10/10 without meds

 

 BMI (Body Mass Index)  29.5 kg/m2  









 10/28/2016  1:19pm  BP Systolic  126 mmHg  









 BP Diastolic  74 mmHg  

 

 Heart Rate  76 /min  

 

 Respiratory Rate  18 /min  

 

 Height  60 inches  5'0"

 

 Weight  151.00 lb  

 

 Pain Level  8  Pain at this time.

 

 Pain Level With Medicine  7  on average with meds

 

 Pain Level Without Medicine  10  10/10 without meds

 

 Pain Level After Procedure  6  

 

 BP Systolic Recheck  122 mmHg  Pulse: 72

 

 BP Diastolic Recheck  74 mmHg  Pulse: 72

 

 BMI (Body Mass Index)  29.5 kg/m2  









 2016 11:09am  BP Systolic  128 mmHg  









 BP Diastolic  76 mmHg  

 

 Heart Rate  74 /min  

 

 Respiratory Rate  18 /min  

 

 Height  60 inches  5'0"

 

 Weight  146.00 lb  

 

 Pain Level  1  Pain at this time.

 

 Pain Level With Medicine  1  on average with meds

 

 Pain Level Without Medicine  10  10/10 without meds

 

 BMI (Body Mass Index)  28.5 kg/m2  









 2016  3:44pm  BP Systolic  130 mmHg  









 BP Diastolic  80 mmHg  

 

 Heart Rate  76 /min  

 

 Respiratory Rate  18 /min  

 

 Height  60 inches  5'0"

 

 Weight  142.00 lb  

 

 Pain Level  7  7/10, Pain at this time.

 

 Pain Level With Medicine  7  7/10, on average with meds

 

 Pain Level Without Medicine  10  10/10 without meds

 

 BMI (Body Mass Index)  27.7 kg/m2  









 2016  2:42pm  BP Systolic  132 mmHg  









 BP Diastolic  78 mmHg  

 

 Heart Rate  86 /min  

 

 Respiratory Rate  20 /min  

 

 Height  60 inches  5'0"

 

 Weight  142.00 lb  

 

 Pain Level  7  Pain at this time.

 

 Pain Level With Medicine  6  on average with meds

 

 Pain Level Without Medicine  10  10/10 without meds

 

 BMI (Body Mass Index)  27.7 kg/m2  









 2016 10:20am  BP Systolic  130 mmHg  









 BP Diastolic  80 mmHg  

 

 Heart Rate  78 /min  

 

 Respiratory Rate  18 /min  

 

 Height  60 inches  5'0"

 

 Weight  142.00 lb  

 

 Pain Level  8  Pain at this time.

 

 Pain Level With Medicine  7  on average with meds

 

 Pain Level Without Medicine  10  10/10 without meds

 

 BMI (Body Mass Index)  27.7 kg/m2  









 2016 11:10am  BP Systolic  128 mmHg  









 BP Diastolic  76 mmHg  

 

 Heart Rate  76 /min  

 

 Respiratory Rate  18 /min  

 

 Height  60 inches  5'0"

 

 Weight  142.00 lb  

 

 Pain Level  8  /10, Pain at this time.

 

 Pain Level With Medicine  8  8/10, on average with meds

 

 Pain Level Without Medicine  10  10/10 without meds

 

 BMI (Body Mass Index)  27.7 kg/m2  









 2016 11:14am  BP Systolic  134 mmHg  









 BP Diastolic  80 mmHg  

 

 Heart Rate  80 /min  

 

 Respiratory Rate  18 /min  

 

 Height  60 inches  5'0"

 

 Weight  142.00 lb  

 

 Pain Level  5  510, Pain at this time.

 

 Pain Level With Medicine  5  10, on average with meds

 

 Pain Level Without Medicine  10  10/10 without meds

 

 BMI (Body Mass Index)  27.7 kg/m2  









 2016  3:28pm  BP Systolic  126 mmHg  









 BP Diastolic  76 mmHg  

 

 Heart Rate  78 /min  

 

 Respiratory Rate  18 /min  

 

 Height  60 inches  5'0"

 

 Weight  142.00 lb  

 

 Pain Level  1  Pain at this time.

 

 Pain Level With Medicine  1  on average with meds

 

 Pain Level Without Medicine  10  10/10 without meds

 

 BMI (Body Mass Index)  27.7 kg/m2  









 2016  3:51pm  BP Systolic  118 mmHg  









 BP Diastolic  76 mmHg  

 

 Heart Rate  78 /min  

 

 Respiratory Rate  18 /min  

 

 Height  60 inches  5'0"

 

 Weight  143.00 lb  

 

 Pain Level  1  10, Pain at this time.

 

 Pain Level With Medicine  1  10, on average with meds

 

 Pain Level Without Medicine  10  10/10 without meds

 

 BMI (Body Mass Index)  27.9 kg/m2  









 2016  4:02pm  BP Systolic  132 mmHg  









 BP Diastolic  76 mmHg  

 

 Heart Rate  80 /min  

 

 Respiratory Rate  18 /min  

 

 Height  60 inches  5'0"

 

 Weight  147.00 lb  

 

 Pain Level  1  10, Pain at this time.

 

 Pain Level With Medicine  1  /10, on average with meds

 

 Pain Level Without Medicine  10  10/10 without meds

 

 BMI (Body Mass Index)  28.7 kg/m2  









 2016  4:21pm  BP Systolic  126 mmHg  









 BP Diastolic  78 mmHg  

 

 Heart Rate  76 /min  

 

 Respiratory Rate  18 /min  

 

 Height  60 inches  5'0"

 

 Weight  147.00 lb  

 

 Pain Level  6  Pain at this time.

 

 Pain Level With Medicine  5  on average with meds

 

 Pain Level Without Medicine  10  10/10 without meds

 

 Pain Level After Procedure  4  

 

 BP Systolic Recheck  132 mmHg  Pulse: 80

 

 BP Diastolic Recheck  84 mmHg  Pulse: 80

 

 BMI (Body Mass Index)  28.7 kg/m2  









 2016  3:43pm  BP Systolic  134 mmHg  









 BP Diastolic  76 mmHg  

 

 Heart Rate  80 /min  

 

 Respiratory Rate  18 /min  

 

 Height  60 inches  5'0"

 

 Weight  145.00 lb  

 

 Pain Level  7  /10, Pain at this time.

 

 Pain Level With Medicine  5  5/10, on average with meds

 

 Pain Level Without Medicine  10  10/10 without meds

 

 BMI (Body Mass Index)  28.3 kg/m2  









 2016  1:23pm  BP Systolic  118 mmHg  









 BP Diastolic  78 mmHg  

 

 Heart Rate  74 /min  

 

 Respiratory Rate  18 /min  

 

 Height  60 inches  5'0"

 

 Weight  144.00 lb  

 

 Pain Level  4  Pain at this time.

 

 Pain Level With Medicine  3  on average with meds

 

 Pain Level Without Medicine  10  10/10 without meds

 

 BMI (Body Mass Index)  28.1 kg/m2  









 2016  4:02pm  BP Systolic  128 mmHg  









 BP Diastolic  80 mmHg  

 

 Heart Rate  82 /min  

 

 Respiratory Rate  18 /min  

 

 Height  60 inches  5'0"

 

 Weight  147.00 lb  

 

 Pain Level  8  8/10, Pain at this time.

 

 Pain Level With Medicine  3  3/10, on average with meds

 

 Pain Level Without Medicine  10  10/10 without meds

 

 BMI (Body Mass Index)  28.7 kg/m2  









 2016  3:40pm  BP Systolic  126 mmHg  









 BP Diastolic  74 mmHg  

 

 Heart Rate  78 /min  

 

 Respiratory Rate  20 /min  

 

 Height  60 inches  5'0"

 

 Weight  147.00 lb  

 

 Pain Level  5  Pain at this time.

 

 Pain Level With Medicine  4  on average with meds

 

 Pain Level Without Medicine  10  10/10 without meds

 

 BMI (Body Mass Index)  28.7 kg/m2  









 2016  3:55pm  BP Systolic  126 mmHg  









 BP Diastolic  74 mmHg  

 

 Heart Rate  76 /min  

 

 Respiratory Rate  20 /min  

 

 Height  60 inches  5'0"

 

 Weight  147.00 lb  

 

 Pain Level  8  Pain at this time.

 

 Pain Level With Medicine  7  on average with meds

 

 Pain Level Without Medicine  10  10/10 without meds

 

 Pain Level After Procedure  6  

 

 BP Systolic Recheck  156 mmHg  Pulse: 102

 

 BP Diastolic Recheck  98 mmHg  Pulse: 102

 

 BMI (Body Mass Index)  28.7 kg/m2  









 2015  2:18pm  BP Systolic  130 mmHg  









 BP Diastolic  80 mmHg  

 

 Heart Rate  76 /min  

 

 Respiratory Rate  18 /min  

 

 Height  60 inches  5'0"

 

 Weight  147.00 lb  

 

 Pain Level  8  8/10, Pain at this time.

 

 Pain Level With Medicine  7  7/10, on average with meds

 

 Pain Level Without Medicine  10  10/10 without meds

 

 BMI (Body Mass Index)  28.7 kg/m2  









 2015  2:47pm  BP Systolic  106 mmHg  









 BP Diastolic  72 mmHg  

 

 Heart Rate  78 /min  

 

 Respiratory Rate  18 /min  

 

 Height  60 inches  5'0"

 

 Weight  147.00 lb  

 

 Pain Level  7  710, Pain at this time.

 

 Pain Level With Medicine  4  410, on average with meds

 

 Pain Level Without Medicine  10  10/10 without meds

 

 BMI (Body Mass Index)  28.7 kg/m2  









 10/23/2015  1:45pm  BP Systolic  122 mmHg  









 BP Diastolic  78 mmHg  

 

 Heart Rate  80 /min  

 

 Respiratory Rate  18 /min  

 

 Height  60 inches  5'0"

 

 Weight  147.00 lb  

 

 Pain Level  7  7/10, Pain at this time.

 

 Pain Level With Medicine  4  410, on average with meds

 

 Pain Level Without Medicine  10  10/10 without meds

 

 BMI (Body Mass Index)  28.7 kg/m2  









 2015  1:42pm  BP Systolic  132 mmHg  









 BP Diastolic  74 mmHg  

 

 Heart Rate  70 /min  

 

 Respiratory Rate  18 /min  

 

 Height  60 inches  5'0"

 

 Weight  143.00 lb  

 

 Pain Level  6  610, Pain at this time.

 

 Pain Level With Medicine  6  6/10, on average with meds

 

 Pain Level Without Medicine  10  10/10 without meds

 

 BMI (Body Mass Index)  27.9 kg/m2  









 2015  2:01pm  BP Systolic  114 mmHg  









 BP Diastolic  78 mmHg  

 

 Heart Rate  80 /min  

 

 Respiratory Rate  18 /min  

 

 Height  60 inches  5'0"

 

 Weight  144.00 lb  

 

 Pain Level  7  10, Pain at this time.

 

 Pain Level With Medicine  7  10, on average with meds

 

 Pain Level Without Medicine  10  10+ 15

 

 BMI (Body Mass Index)  28.1 kg/m2  









 2015  3:59pm  BP Systolic  126 mmHg  









 BP Diastolic  78 mmHg  

 

 Heart Rate  74 /min  

 

 Respiratory Rate  18 /min  

 

 Height  60 inches  5'0"

 

 Weight  146.00 lb  

 

 Pain Level  9  Pain at this time.

 

 Pain Level With Medicine  9  on average with meds

 

 Pain Level Without Medicine  10  10/10 without meds

 

 Pain Level After Procedure  7  

 

 BP Systolic Recheck  134 mmHg  Pulse: 76

 

 BP Diastolic Recheck  80 mmHg  Pulse: 76

 

 BMI (Body Mass Index)  28.5 kg/m2  









 2015  3:52pm  BP Systolic  148 mmHg  









 BP Diastolic  90 mmHg  

 

 Heart Rate  88 /min  

 

 Respiratory Rate  18 /min  

 

 Height  60 inches  5'0"

 

 Weight  145.00 lb  

 

 Pain Level  8  Pain at this time.

 

 Pain Level With Medicine  7  on average with meds

 

 Pain Level Without Medicine  10  10/10 without meds

 

 BMI (Body Mass Index)  28.3 kg/m2  









 2015 10:25am  BP Systolic  132 mmHg  









 BP Diastolic  84 mmHg  

 

 Heart Rate  80 /min  

 

 Respiratory Rate  20 /min  

 

 Height  60 inches  5'0"

 

 Weight  149.00 lb  

 

 Pain Level  6  Pain at this time.

 

 Pain Level With Medicine  5  on average with meds

 

 Pain Level Without Medicine  10  10/10 without meds

 

 BMI (Body Mass Index)  29.1 kg/m2  









 2015  3:04pm  BP Systolic  118 mmHg  









 BP Diastolic  76 mmHg  

 

 Heart Rate  80 /min  

 

 Respiratory Rate  18 /min  

 

 Height  60 inches  5'0"

 

 Weight  150.00 lb  

 

 Pain Level  5  5/10, Pain at this time.

 

 Pain Level With Medicine  3  310, on average with meds

 

 Pain Level Without Medicine  10  10/10 without meds

 

 BMI (Body Mass Index)  29.3 kg/m2  









 2015  3:41pm  BP Systolic  128 mmHg  









 BP Diastolic  78 mmHg  

 

 Heart Rate  80 /min  

 

 Respiratory Rate  20 /min  

 

 Height  60 inches  5'0"

 

 Weight  156.00 lb  

 

 Pain Level  6  Pain at this time.

 

 Pain Level With Medicine  5  on average with meds

 

 Pain Level Without Medicine  10  10/10 without meds

 

 BMI (Body Mass Index)  30.5 kg/m2  









 2015 10:33am  BP Systolic  118 mmHg  









 BP Diastolic  74 mmHg  

 

 Heart Rate  72 /min  

 

 Respiratory Rate  18 /min  

 

 Height  60 inches  5'0"

 

 Weight  154.00 lb  

 

 Pain Level  4  4/10, Pain at this time.

 

 Pain Level With Medicine  4  4/10, on average with meds

 

 Pain Level Without Medicine  10  10/10 without meds

 

 BMI (Body Mass Index)  30.1 kg/m2  









 2015  2:42pm  BP Systolic  120 mmHg  









 BP Diastolic  76 mmHg  

 

 Heart Rate  74 /min  

 

 Respiratory Rate  18 /min  

 

 Height  60 inches  5'0"

 

 Weight  154.00 lb  

 

 Pain Level  5  5/10, Pain at this time.

 

 Pain Level With Medicine  5  5/10, on average with meds

 

 Pain Level Without Medicine  10  10/10 without meds

 

 BMI (Body Mass Index)  30.1 kg/m2  









 12/15/2014 10:07am  BP Systolic  132 mmHg  









 BP Diastolic  80 mmHg  

 

 Heart Rate  84 /min  

 

 Respiratory Rate  18 /min  

 

 Height  60 inches  5'0"

 

 Weight  155.00 lb  

 

 Pain Level  8  8/10, Pain at this time.

 

 Pain Level With Medicine  8  8/10, on average with meds

 

 Pain Level Without Medicine  10  10/10 without meds

 

 BMI (Body Mass Index)  30.3 kg/m2  









 2014 11:49am  BP Systolic  134 mmHg  









 BP Diastolic  82 mmHg  

 

 Heart Rate  84 /min  

 

 Respiratory Rate  18 /min  

 

 Height  60 inches  5'0"

 

 Weight  152.00 lb  

 

 Pain Level  7  Pain at this time.

 

 Pain Level With Medicine  6  on average with meds

 

 Pain Level Without Medicine  10  10/10 without meds

 

 BMI (Body Mass Index)  29.7 kg/m2  









 10/22/2014 11:04am  BP Systolic  120 mmHg  









 BP Diastolic  72 mmHg  

 

 Heart Rate  74 /min  

 

 Respiratory Rate  18 /min  

 

 Height  60 inches  5'0"

 

 Weight  155.00 lb  

 

 Pain Level  9  9/10, Pain at this time.

 

 Pain Level With Medicine  8  8/10, on average with meds

 

 Pain Level Without Medicine  10  10/10 without meds

 

 BMI (Body Mass Index)  30.3 kg/m2  









 2014  3:06pm  Respiratory Rate  18 /min  









 Height  60 inches  5'0"

 

 Weight  152.00 lb  

 

 Pain Level  9  9/10, Pain at this time.

 

 Pain Level With Medicine  7  710, on average with meds

 

 Pain Level Without Medicine  10  10/10 without meds

 

 BMI (Body Mass Index)  29.7 kg/m2  









 2014  2:54pm  BP Systolic  118 mmHg  









 BP Diastolic  74 mmHg  

 

 Heart Rate  78 /min  

 

 Respiratory Rate  18 /min  

 

 Height  60 inches  5'0"

 

 Weight  160.00 lb  

 

 Pain Level  6  6/10, Pain at this time.

 

 Pain Level With Medicine  5  510, on average with meds

 

 Pain Level Without Medicine  10  10/10 without meds

 

 BMI (Body Mass Index)  31.2 kg/m2  









 2014  2:53pm  BP Systolic  124 mmHg  









 BP Diastolic  78 mmHg  

 

 Heart Rate  80 /min  

 

 Respiratory Rate  18 /min  

 

 Height  60 inches  5'0"

 

 Weight  156.00 lb  

 

 Pain Level  8  8/10, Pain at this time.

 

 Pain Level With Medicine  5  5/10, on average with meds

 

 Pain Level Without Medicine  10  10/10 without meds

 

 BMI (Body Mass Index)  30.5 kg/m2  









 2014  4:39pm  BP Systolic  122 mmHg  









 BP Diastolic  70 mmHg  

 

 Heart Rate  74 /min  

 

 Respiratory Rate  18 /min  

 

 Height  60 inches  5'0"

 

 Weight  154.00 lb  

 

 Pain Level  9  Pain at this time.

 

 Pain Level With Medicine  8  on average with meds

 

 Pain Level Without Medicine  10  10/10 without meds

 

 BMI (Body Mass Index)  30.1 kg/m2  









 2014  3:44pm  BP Systolic  114 mmHg  









 BP Diastolic  76 mmHg  

 

 Heart Rate  72 /min  

 

 Respiratory Rate  18 /min  

 

 Height  60 inches  5'0"

 

 Weight  153.00 lb  

 

 Pain Level  8  8/10, Pain at this time.

 

 Pain Level With Medicine  7  7/10, on average with meds

 

 Pain Level Without Medicine  10  10/10 without meds

 

 BMI (Body Mass Index)  29.9 kg/m2  









 2014 11:09am  BP Systolic  118 mmHg  









 BP Diastolic  72 mmHg  

 

 Heart Rate  74 /min  

 

 Respiratory Rate  18 /min  

 

 Height  60 inches  5'0"

 

 Weight  155.00 lb  

 

 Pain Level  6-7  6-7/10, Pain at this time.

 

 Pain Level With Medicine  6  6/10, on average with meds

 

 Pain Level Without Medicine  10  10/10 without meds

 

 Pain Level After Procedure  5  

 

 BP Systolic Recheck  120 mmHg  Pulse: 80

 

 BP Diastolic Recheck  76 mmHg  Pulse: 80

 

 BMI (Body Mass Index)  30.3 kg/m2  









 2014  9:57am  BP Systolic  126 mmHg  









 BP Diastolic  74 mmHg  

 

 Heart Rate  76 /min  

 

 Respiratory Rate  18 /min  

 

 Height  60 inches  5'0"

 

 Weight  155.00 lb  

 

 Pain Level  7-8  7-8/10, Pain at this time.

 

 BMI (Body Mass Index)  30.3 kg/m2  







Procedures







 Date  Code  Description  Status

 

 2019  67235  Therapeutic, Prophylactic Or Diagnostic Injection Subq/Im  
Completed

 

 2019  51510  Brief Emotional/Behav Assessment W/ Scoring Doc Per  
Completed



     Standard Inst  

 

 2019  01277  Inject/Drain Arthrocentesis Small Joint/Bursa  Completed

 

 2019  05211  Omt  5-6 Body Regions  Completed

 

 2019  92098  Therapeutic, Prophylactic Or Diagnostic Injection Subq/Im  
Completed

 

 2018  51038  Omt  1-2 Body Regions  Completed

 

 201850  Inject Tendon/Ligament  Completed

 

 10/01/2018  11455  Omt  3-4 Body Regions  Completed

 

 10/01/2018  72863  Therapeutic, Prophylactic Or Diagnostic Injection Subq/Im  
Completed

 

 2018  44776  Therapeutic, Prophylactic Or Diagnostic Injection Subq/Im  
Completed

 

 201810  Inject/Drain Arthrocentesis Major Joint/Bursa/Ganglion  
Completed



     Cyst  

 

 2018  50867  Therapeutic, Prophylactic Or Diagnostic Injection Subq/Im  
Completed

 

 201850  Inject Tendon/Ligament  Completed

 

 201850  Inject Tendon/Ligament  Completed

 

 201850  Inject Tendon/Ligament  Completed

 

 2018  95167  Therapeutic, Prophylactic Or Diagnostic Injection Subq/Im  
Completed

 

 2018  98768  Omt  3-4 Body Regions  Completed

 

 2018  11003  Omt  5-6 Body Regions  Completed

 

 2018  01188  Therapeutic, Prophylactic Or Diagnostic Injection Subq/Im  
Completed

 

 2018  83695  Therapeutic, Prophylactic Or Diagnostic Injection Subq/Im  
Completed

 

 2018  07249  Therapeutic, Prophylactic Or Diagnostic Injection Subq/Im  
Completed

 

 2018  98994  Therapeutic, Prophylactic Or Diagnostic Injection Subq/Im  
Completed

 

 2018  00746  Inject/Drain Arthrocentesis Intermediate Joint/Bursa  
Completed

 

 2018  04256  Omt  3-4 Body Regions  Completed

 

 2018  52816  Therapeutic, Prophylactic Or Diagnostic Injection Subq/Im  
Completed

 

 2018  70199  Injection For Nerve Block, Other Peripheral Nerve Or  
Completed



     Branch  

 

 2017  34374  Omt  1-2 Body Regions  Completed

 

 2017  67559  Therapeutic, Prophylactic Or Diagnostic Injection Subq/Im  
Completed

 

 2017  27905  Omt  1-2 Body Regions  Completed

 

 2017  75099  Therapeutic, Prophylactic Or Diagnostic Injection Subq/Im  
Completed

 

 10/16/2017  27066  Arthrocentesis/Aspiration/Inj Of Major Joint Or Bursa W/  
Completed



     Ultra  

 

 10/16/2017  21392  Therapeutic, Prophylactic Or Diagnostic Injection Subq/Im  
Completed

 

 10/16/2017  95992  Omt  3-4 Body Regions  Completed

 

 10/02/2017  00882  Omt  1-2 Body Regions  Completed

 

 2017  28347  Therapeutic, Prophylactic Or Diagnostic Injection Subq/Im  
Completed

 

 08/10/2017  46514  U/S Guidance For Needle Placement  Completed

 

 08/10/2017  35532  Inject Tendon/Ligament  Completed

 

 2017  85387  Therapeutic, Prophylactic Or Diagnostic Injection Subq/Im  
Completed

 

 2017  78532  Arthrocentesis Aspiration Inj, Small Joint/Bursa W US  
Completed



     Guidance  

 

 2017  Arthrocentesis/Aspiration/Inj Of Major Joint Or Bursa W/  
Completed



     Ultra  

 

 2017  Arthrocentesis/Aspiration/Inj Of Major Joint Or Bursa W/  
Completed



     Ultra  

 

 2017  69238  Therapeutic, Prophylactic Or Diagnostic Injection Subq/Im  
Completed

 

 2017  59896  Test Autonomic Nervous System, Sudomotor  Completed

 

 2017  Arthrocentesis Aspiration Inj, Small Joint/Bursa W US  
Completed



     Guidance  

 

 2017  Arthrocentesis Aspiration Inj, Small Joint/Bursa W US  
Completed



     Guidance  

 

 2016  83262  Omt  3-4 Body Regions  Completed

 

 2016  25804  Therapeutic, Prophylactic Or Diagnostic Injection Subq/Im  
Completed

 

 10/28/2016  61253  Arthrocentesis/Aspiration/Inj Of Major Joint Or Bursa W/  
Completed



     Ultra  

 

 2016  Arthrocentesis Aspiration Inj, Small Joint/Bursa W US  
Completed



     Guidance  

 

 2016  05476  U/S Guidance For Needle Placement  Completed

 

 2016  20085  Inject Tendon/Ligament  Completed

 

 2016  Arthrocentesis Aspiration Inj, Small Joint/Bursa W US  
Completed



     Guidance  

 

 2016  Arthrocentesis Aspiration Inj, Small Joint/Bursa W US  
Completed



     Guidance  

 

 2016  16968  Therapeutic, Prophylactic Or Diagnostic Injection Subq/Im  
Completed

 

 2016  Arthrocentesis Aspirtation Inj,Intermediate W/ US Guide &  
Completed



     Report  

 

 2016  55730  Omt  3-4 Body Regions  Completed

 

 2015  03365  Therapeutic, Prophylactic Or Diagnostic Injection Subq/Im  
Completed

 

 2015  18243  Test Autonomic Nervous System, Sudomotor  Completed

 

 2015  83609  Omt  3-4 Body Regions  Completed

 

 12/15/2014  36275  Omt  1-2 Body Regions  Completed

 

 2014  51995  Omt  3-4 Body Regions  Completed

 

 2014  99436  U/S Guidance For Needle Placement  Completed

 

 2014  78081  Inject Tendon/Ligament  Completed







Encounters







 Type  Date  Location  Provider  Dx  Diagnosis

 

 Office Visit  2019  Main Office as Of  Maxi Samson DO,  G89.29  Other 
chronic pain



   2:45p  14  MPH    









 M54.2  Cervicalgia

 

 M54.6  Pain in thoracic spine

 

 M54.5  Low back pain

 

 R53.83  Other fatigue

 

 Z79.891  Long term (current) use of opiate analgesic

 

 Z71.89  Other specified counseling









 Office Visit  2019  1:30p  Main Office as  Maxi Samson,  G89.29  Other 
chronic



     Of 14  DO, MPH    pain









 M54.2  Cervicalgia

 

 M54.6  Pain in thoracic spine

 

 M54.5  Low back pain

 

 M53.3  Sacrococcygeal disorders, not elsewhere classified

 

 Z13.31  Encounter for screening for depression

 

 Z79.891  Long term (current) use of opiate analgesic









 Office Visit  2019  2:45p  Main Office as  Maxi Samson  G89.29  Other 
chronic



     Of 14  DO, MPH    pain









 M54.2  Cervicalgia

 

 M54.6  Pain in thoracic spine

 

 M54.5  Low back pain

 

 Z79.891  Long term (current) use of opiate analgesic









 Office Visit  02/15/2019  2:15p  Main Office as  Maxi Samson  G89.29  Other 
chronic



     Of 14  DO, MPH    pain









 M54.2  Cervicalgia

 

 M54.6  Pain in thoracic spine

 

 M54.5  Low back pain

 

 M25.552  Pain in left hip

 

 Z79.891  Long term (current) use of opiate analgesic









 Office Visit  2019  3:15p  Main Office as  Maxi Samson,  G89.29  Other 
chronic



     Of 14  DO, MPH    pain









 M54.2  Cervicalgia

 

 M99.01  Segmental and somatic dysfunction of cervical region

 

 M54.6  Pain in thoracic spine

 

 M99.02  Segmental and somatic dysfunction of thoracic region

 

 M54.5  Low back pain

 

 M99.03  Segmental and somatic dysfunction of lumbar region

 

 M53.3  Sacrococcygeal disorders, not elsewhere classified

 

 M99.04  Segmental and somatic dysfunction of sacral region

 

 M25.552  Pain in left hip

 

 R53.83  Other fatigue

 

 Z79.891  Long term (current) use of opiate analgesic

 

 M25.551  Pain in right hip

 

 M99.05  Segmental and somatic dysfunction of pelvic region









 Office Visit  2018 10:45a  Main Office as  Maxi Samson  G89.29  Other 
chronic



     Of 14  DO, MPH    pain









 M54.2  Cervicalgia

 

 M54.6  Pain in thoracic spine

 

 M25.522  Pain in left elbow

 

 M25.521  Pain in right elbow

 

 Z79.891  Long term (current) use of opiate analgesic









 Office Visit  2018 11:30a  Main Office as  Maxi Samson  G89.29  Other 
chronic



     Of 14  DO, MPH    pain









 M54.2  Cervicalgia

 

 M54.6  Pain in thoracic spine

 

 M25.522  Pain in left elbow

 

 M25.521  Pain in right elbow

 

 M25.572  Pain in left ankle and joints of left foot

 

 M65.88  Other synovitis and tenosynovitis, other site

 

 M99.07  Segmental and somatic dysfunction of upper extremity

 

 Z79.891  Long term (current) use of opiate analgesic

 

 Z63.79  Other stressful life events affecting family and household









 Office Visit  10/30/2018  2:30p  Main Office as  Maxi Samson  G89.29  Other 
chronic



     Of 14  DO, MPH    pain









 M54.2  Cervicalgia

 

 M54.6  Pain in thoracic spine

 

 M54.5  Low back pain









 Office Visit  10/01/2018  1:45p  Main Office as  Maxi Samson  G89.29  Other 
chronic



     Of 14  DO, MPH    pain









 M54.2  Cervicalgia

 

 M99.01  Segmental and somatic dysfunction of cervical region

 

 M54.6  Pain in thoracic spine

 

 M99.02  Segmental and somatic dysfunction of thoracic region

 

 M54.5  Low back pain

 

 M99.03  Segmental and somatic dysfunction of lumbar region

 

 R53.83  Other fatigue

 

 Z79.891  Long term (current) use of opiate analgesic









 Office Visit  2018 11:30a  Main Office as  Maxi Samson  G89.29  Other 
chronic



     Of 14  DO, MPH    pain









 M54.2  Cervicalgia

 

 M54.6  Pain in thoracic spine

 

 M54.5  Low back pain

 

 R53.83  Other fatigue

 

 M53.3  Sacrococcygeal disorders, not elsewhere classified

 

 Z79.891  Long term (current) use of opiate analgesic









 Office Visit  2018 10:45a  Main Office as  Maxi Samson  G89.29  Other 
chronic



     Of 14  DO, MPH    pain









 M54.2  Cervicalgia

 

 M54.6  Pain in thoracic spine

 

 M54.5  Low back pain

 

 Z79.891  Long term (current) use of opiate analgesic

 

 R53.83  Other fatigue









 Office Visit  2018 11:00a  Main Office as  Maxi Samson  G89.29  Other 
chronic



     Of 14  DO, MPH    pain









 M54.2  Cervicalgia

 

 M99.01  Segmental and somatic dysfunction of cervical region

 

 M54.6  Pain in thoracic spine

 

 M99.02  Segmental and somatic dysfunction of thoracic region

 

 M54.5  Low back pain

 

 M99.03  Segmental and somatic dysfunction of lumbar region

 

 M53.3  Sacrococcygeal disorders, not elsewhere classified

 

 M99.04  Segmental and somatic dysfunction of sacral region

 

 M65.88  Other synovitis and tenosynovitis, other site

 

 Z79.891  Long term (current) use of opiate analgesic

 

 R53.83  Other fatigue

 

 M79.604  Pain in right leg









 Office Visit  2018  9:45a  Main Office as  Maxi Samson  G89.29  Other 
chronic



     Of 14  DO, MPH    pain









 M54.2  Cervicalgia

 

 M99.01  Segmental and somatic dysfunction of cervical region

 

 M54.6  Pain in thoracic spine

 

 M99.02  Segmental and somatic dysfunction of thoracic region

 

 M54.5  Low back pain

 

 M99.03  Segmental and somatic dysfunction of lumbar region

 

 M53.3  Sacrococcygeal disorders, not elsewhere classified

 

 M99.04  Segmental and somatic dysfunction of sacral region

 

 M99.05  Segmental and somatic dysfunction of pelvic region

 

 R53.83  Other fatigue

 

 Z79.891  Long term (current) use of opiate analgesic









 Office Visit  2018  9:00a  Main Office as  Maxi Samson  G89.29  Other 
chronic



     Of 14  DO, MPH    pain









 M54.2  Cervicalgia

 

 M54.5  Low back pain

 

 R53.83  Other fatigue

 

 Z79.891  Long term (current) use of opiate analgesic









 Office Visit  2018 11:30a  Main Office as  Maxi Samson  G89.29  Other 
chronic



     Of 14  DO, MPH    pain









 M54.5  Low back pain

 

 M54.2  Cervicalgia

 

 R53.83  Other fatigue

 

 Z79.891  Long term (current) use of opiate analgesic









 Office Visit  2018 11:00a  Main Office as  Maxi Samson  G89.29  Other 
chronic



     Of 14  DO, MPH    pain









 M54.5  Low back pain

 

 M25.551  Pain in right hip

 

 M54.6  Pain in thoracic spine

 

 M54.2  Cervicalgia

 

 R53.83  Other fatigue

 

 Z79.891  Long term (current) use of opiate analgesic









 Office Visit  2018  9:45a  Main Office as  Maxi Samson  G89.29  Other 
chronic



     Of 14  DO, MPH    pain









 M54.5  Low back pain

 

 M53.3  Sacrococcygeal disorders, not elsewhere classified

 

 Z79.891  Long term (current) use of opiate analgesic









 Office Visit  2018  9:45a  Main Office as  Maxi Samson  G89.29  Other 
chronic



     Of 14  DO, MPH    pain









 M54.5  Low back pain

 

 M99.03  Segmental and somatic dysfunction of lumbar region

 

 M54.6  Pain in thoracic spine

 

 M99.02  Segmental and somatic dysfunction of thoracic region

 

 M54.2  Cervicalgia

 

 M99.01  Segmental and somatic dysfunction of cervical region

 

 M79.1  Myalgia

 

 M54.16  Radiculopathy, lumbar region

 

 R53.83  Other fatigue

 

 M53.3  Sacrococcygeal disorders, not elsewhere classified

 

 Z79.891  Long term (current) use of opiate analgesic

 

 M99.04  Segmental and somatic dysfunction of sacral region









 Office Visit  01/15/2018  4:15p  Main Office as  Maxi Samson  G89.29  Other 
chronic



     Of 14  DO, MPH    pain









 Z79.891  Long term (current) use of opiate analgesic









 Office Visit  2018  3:00p  Main Office as  Maxi Samson  G89.29  Other 
chronic



     Of 14  DO, MPH    pain









 M54.5  Low back pain

 

 M79.1  Myalgia

 

 M54.16  Radiculopathy, lumbar region

 

 Z79.891  Long term (current) use of opiate analgesic









 Office Visit  2017 11:15a  Main Office as  Maxi Samson  G89.29  Other 
chronic



     Of 14  DO, MPH    pain









 M54.5  Low back pain

 

 M79.1  Myalgia

 

 M25.571  Pain in right ankle and joints of right foot

 

 R53.83  Other fatigue

 

 Z79.891  Long term (current) use of opiate analgesic

 

 M99.03  Segmental and somatic dysfunction of lumbar region









 Office Visit  2017  3:00p  Main Office as  Maxi Samson  G89.29  Other 
chronic



     Of 14  DO, MPH    pain









 M54.5  Low back pain

 

 M99.03  Segmental and somatic dysfunction of lumbar region

 

 M79.1  Myalgia

 

 Z79.891  Long term (current) use of opiate analgesic

 

 R53.83  Other fatigue









 Office Visit  10/16/2017  9:30a  Main Office as  Maxi Samson  G89.29  Other 
chronic



     Of 14  DO, MPH    pain









 M54.5  Low back pain

 

 M99.03  Segmental and somatic dysfunction of lumbar region

 

 M79.1  Myalgia

 

 M65.88  Other synovitis and tenosynovitis, other site

 

 M54.16  Radiculopathy, lumbar region

 

 Z79.891  Long term (current) use of opiate analgesic

 

 M53.3  Sacrococcygeal disorders, not elsewhere classified

 

 R53.83  Other fatigue









 Office Visit  10/02/2017 10:30a  Main Office as  Maxi Samson  G89.29  Other 
chronic



     Of 14  DO, MPH    pain









 M54.5  Low back pain

 

 M79.1  Myalgia

 

 M99.03  Segmental and somatic dysfunction of lumbar region

 

 M65.88  Other synovitis and tenosynovitis, other site

 

 Z79.891  Long term (current) use of opiate analgesic









 Office Visit  2017  3:30p  Main Office as  Maxi Samson  G89.29  Other 
chronic



     Of 14  DO, MPH    pain









 M25.571  Pain in right ankle and joints of right foot

 

 M65.88  Other synovitis and tenosynovitis, other site

 

 M53.3  Sacrococcygeal disorders, not elsewhere classified

 

 Z79.891  Long term (current) use of opiate analgesic

 

 R53.83  Other fatigue









 Office Visit  08/10/2017  3:30p  Main Office as  Maxi Samson  G89.29  Other 
chronic



     Of 14  DO, MPH    pain









 M54.5  Low back pain

 

 M54.2  Cervicalgia

 

 M25.571  Pain in right ankle and joints of right foot

 

 M79.671  Pain in right foot

 

 M65.88  Other synovitis and tenosynovitis, other site









 Office Visit  2017 10:00a  Main Office as  Maxi Samson  G89.29  Other 
chronic



     Of 14  DO, MPH    pain









 M54.5  Low back pain

 

 M53.3  Sacrococcygeal disorders, not elsewhere classified

 

 R53.83  Other fatigue

 

 Z79.891  Long term (current) use of opiate analgesic









 Office Visit  2017  1:30p  Main Office as  Maxi Samson,  G89.29  Other 
chronic



     Of 14  DO, MPH    pain









 M54.5  Low back pain

 

 M53.3  Sacrococcygeal disorders, not elsewhere classified









 Office Visit  2017  2:45p  Main Office as  Maxi Samson  G89.29  Other 
chronic



     Of 14  DO, MPH    pain









 M54.5  Low back pain

 

 M53.3  Sacrococcygeal disorders, not elsewhere classified

 

 Z79.891  Long term (current) use of opiate analgesic









 Office Visit  2017  4:00p  Main Office as  Maxi Samson  G89.29  Other 
chronic



     Of 14  DO, MPH    pain









 M54.5  Low back pain

 

 M25.571  Pain in right ankle and joints of right foot









 Office Visit  2017  2:45p  Main Office as  Maxi Samson,  G89.29  Other 
chronic



     Of 14  DO, MPH    pain









 M54.5  Low back pain

 

 M53.3  Sacrococcygeal disorders, not elsewhere classified

 

 Z79.891  Long term (current) use of opiate analgesic









 Office Visit  2017  3:30p  Main Office as  Corinne Calvillo  G89.29  
Other chronic



     Of 14  FNP    pain









 M53.3  Sacrococcygeal disorders, not elsewhere classified

 

 M54.5  Low back pain

 

 Z71.89  Other specified counseling

 

 Z79.891  Long term (current) use of opiate analgesic









 Office Visit  2017  3:15p  Main Office as  Maxi Samson  G89.29  Other 
chronic



     Of 14  DO, MPH    pain









 M54.5  Low back pain

 

 M53.3  Sacrococcygeal disorders, not elsewhere classified

 

 Z79.891  Long term (current) use of opiate analgesic









 Office Visit  2017  2:00p  Main Office as  Maxi Samson  G89.29  Other 
chronic



     Of 14  DO, MPH    pain









 M54.2  Cervicalgia

 

 M54.5  Low back pain

 

 M53.3  Sacrococcygeal disorders, not elsewhere classified

 

 M25.512  Pain in left shoulder

 

 Z79.891  Long term (current) use of opiate analgesic









 Office Visit  2017 10:30a  Main Office as  Corinne Calvillo  G89.29  
Other chronic



     Of 14  FNP    pain









 M25.512  Pain in left shoulder

 

 M54.2  Cervicalgia

 

 M54.5  Low back pain

 

 M53.3  Sacrococcygeal disorders, not elsewhere classified

 

 M79.644  Pain in right finger(s)

 

 M79.645  Pain in left finger(s)

 

 Z79.891  Long term (current) use of opiate analgesic









 Office Visit  2017  3:30p  Main Office as  Corinne Calvillo  G89.29  
Other chronic



     Of 14  FNP    pain









 M54.2  Cervicalgia

 

 M54.5  Low back pain

 

 M53.3  Sacrococcygeal disorders, not elsewhere classified

 

 M25.512  Pain in left shoulder

 

 M79.645  Pain in left finger(s)

 

 M79.644  Pain in right finger(s)

 

 R53.83  Other fatigue









 Office Visit  2017  3:30p  Main Office as  Maxi Samson  G89.29  Other 
chronic



     Of 14  DO, MPH    pain









 M53.3  Sacrococcygeal disorders, not elsewhere classified

 

 M79.644  Pain in right finger(s)

 

 M79.645  Pain in left finger(s)

 

 G90.3  Multi-system degeneration of the autonomic nervous system









 Office Visit  2016  3:30p  Main Office as  Corinne Calvillo  G89.29  
Other chronic



     Of 14  FNP    pain









 M54.5  Low back pain

 

 M54.2  Cervicalgia

 

 M79.644  Pain in right finger(s)

 

 M79.645  Pain in left finger(s)

 

 M25.512  Pain in left shoulder

 

 Z79.891  Long term (current) use of opiate analgesic









 Office Visit  2016  3:30p  Main Office as  Maxi Samson  G89.29  Other 
chronic



     Of 14  DO, MPH    pain









 M53.3  Sacrococcygeal disorders, not elsewhere classified

 

 M54.2  Cervicalgia

 

 M99.01  Segmental and somatic dysfunction of cervical region

 

 M54.6  Pain in thoracic spine

 

 M99.02  Segmental and somatic dysfunction of thoracic region

 

 M99.03  Segmental and somatic dysfunction of lumbar region

 

 M54.5  Low back pain

 

 R53.83  Other fatigue









 Office Visit  10/28/2016  1:30p  Main Office as  Maxi Samson  G89.29  Other 
chronic



     Of 14  DO, MPH    pain









 M53.3  Sacrococcygeal disorders, not elsewhere classified

 

 M54.5  Low back pain









 Office Visit  2016 11:30a  Main Office as  Maxi Samson  G89.29  Other 
chronic



     Of 14  DO, MPH    pain









 M54.2  Cervicalgia

 

 M54.5  Low back pain

 

 M79.644  Pain in right finger(s)

 

 Z79.891  Long term (current) use of opiate analgesic









 Office Visit  2016  3:30p  Main Office as  Maxi Samson  G89.29  Other 
chronic



     Of 14  DO, MPH    pain









 M79.644  Pain in right finger(s)









 Office Visit  2016  2:45p  Main Office as  Maxi Sasmon  G89.29  Other 
chronic



     Of 14  DO, MPH    pain









 M25.561  Pain in right knee

 

 M76.51  Patellar tendinitis, right knee









 Office Visit  2016 10:30a  Main Office as  Maxi Samson  G89.29  Other 
chronic



     Of 14  DO, MPH    pain









 M54.2  Cervicalgia

 

 M54.5  Low back pain

 

 M79.645  Pain in left finger(s)

 

 Z79.891  Long term (current) use of opiate analgesic









 Office Visit  2016 11:45a  Main Office as  Corinne Calvillo G89.29  
Other chronic



     Of 14  FNP    pain









 M54.2  Cervicalgia

 

 M54.5  Low back pain

 

 M25.561  Pain in right knee

 

 M25.571  Pain in right ankle and joints of right foot

 

 Z71.89  Other specified counseling

 

 Z79.891  Long term (current) use of opiate analgesic









 Office Visit  2016 11:00a  Main Office as  Corinne Calvillo  G89.29  
Other chronic



     Of 14  FNP    pain









 M54.5  Low back pain

 

 M54.2  Cervicalgia

 

 M25.571  Pain in right ankle and joints of right foot

 

 M25.561  Pain in right knee

 

 R53.83  Other fatigue

 

 Z71.89  Other specified counseling

 

 Z79.891  Long term (current) use of opiate analgesic









 Office Visit  2016  3:45p  Main Office as  Corinne Calvillo  G89.29  
Other chronic



     Of 14  FNP    pain









 M54.2  Cervicalgia

 

 M54.5  Low back pain

 

 M25.561  Pain in right knee

 

 M25.571  Pain in right ankle and joints of right foot

 

 Z79.891  Long term (current) use of opiate analgesic









 Office Visit  2016  4:00p  Main Office as  Corinne Calvillo  G89.29  
Other chronic



     Of 14  FNP    pain









 M54.5  Low back pain

 

 M54.2  Cervicalgia

 

 M25.571  Pain in right ankle and joints of right foot

 

 M25.561  Pain in right knee

 

 Z71.89  Other specified counseling

 

 Z79.891  Long term (current) use of opiate analgesic









 Office Visit  2016  4:00p  Main Office as  Maxi Samson  G89.29  Other 
chronic



     Of 14  DO, MPH    pain









 M54.2  Cervicalgia

 

 M54.5  Low back pain

 

 M25.561  Pain in right knee

 

 M25.571  Pain in right ankle and joints of right foot

 

 Z71.89  Other specified counseling

 

 Z79.891  Long term (current) use of opiate analgesic

 

 R53.83  Other fatigue

 

 M53.3  Sacrococcygeal disorders, not elsewhere classified









 Office Visit  2016  3:00p  Main Office as  Corinne Calvillo  G89.29  
Other chronic



     Of 14  FNP    pain









 M54.2  Cervicalgia

 

 M54.5  Low back pain

 

 M25.561  Pain in right knee

 

 M25.571  Pain in right ankle and joints of right foot

 

 Z71.89  Other specified counseling

 

 Z79.891  Long term (current) use of opiate analgesic

 

 M76.51  Patellar tendinitis, right knee









 Office Visit  2016  1:00p  Main Office as  Maxi Samson  Z79.891  Long 
term



     Of 14  DO, MPH    (current) use of



           opiate analgesic









 G89.29  Other chronic pain

 

 M25.571  Pain in right ankle and joints of right foot

 

 M54.2  Cervicalgia

 

 M54.6  Pain in thoracic spine

 

 M54.5  Low back pain

 

 Z79.891  Long term (current) use of opiate analgesic









 Office Visit  2016  4:00p  Main Office as  Corinne Calvillo G89.29  
Other chronic



     Of 14  FNP    pain









 M25.571  Pain in right ankle and joints of right foot

 

 M54.2  Cervicalgia

 

 M99.01  Segmental and somatic dysfunction of cervical region

 

 M54.6  Pain in thoracic spine

 

 M99.02  Segmental and somatic dysfunction of thoracic region

 

 M54.5  Low back pain

 

 M99.03  Segmental and somatic dysfunction of lumbar region

 

 Z79.891  Long term (current) use of opiate analgesic









 Office Visit  2016  3:15p  Main Office as  Maxi Samson  G89.29  Other 
chronic



     Of 14  DO, MPH    pain









 M54.5  Low back pain

 

 M53.3  Sacrococcygeal disorders, not elsewhere classified

 

 M25.571  Pain in right ankle and joints of right foot









 Office Visit  2016  3:30p  Main Office as  Maxi Samson G89.29  Other 
chronic



     Of 14  DO, MPH    pain









 M54.5  Low back pain

 

 M53.3  Sacrococcygeal disorders, not elsewhere classified









 Office Visit  2015  2:15p  Main Office as  Maxi Samson G89.29  Other 
chronic



     Of 14  DO, MPH    pain









 M54.5  Low back pain

 

 M79.604  Pain in right leg

 

 M25.561  Pain in right knee

 

 M25.571  Pain in right ankle and joints of right foot

 

 G90.3  Multi-system degeneration of the autonomic nervous system

 

 R53.83  Other fatigue









 Office Visit  2015  2:00p  Main Office as  Corinne Calvillo G89.29  
Other chronic



     Of 14  FNP    pain









 M54.5  Low back pain

 

 M79.604  Pain in right leg

 

 M25.561  Pain in right knee

 

 M25.571  Pain in right ankle and joints of right foot

 

 Z71.89  Other specified counseling

 

 M25.512  Pain in left shoulder









 Office Visit  10/23/2015  1:30p  Main Office as  Corinne Calvillo G89.29  
Other chronic



     Of 14  FNP    pain









 M25.571  Pain in right ankle and joints of right foot

 

 M79.604  Pain in right leg

 

 M25.561  Pain in right knee

 

 M54.5  Low back pain

 

 Z79.891  Long term (current) use of opiate analgesic









 Office Visit  2015  1:15p  Main Office as  Corinne Calvillo,  338.29  
Other Chronic



     Of 14  FNP    Pain









 724.2  Lumbago

 

 728.71  Fibromatosis Plantar Fascia

 

 719.47  Pain Joint Ankle & Foot

 

 729.5  Pain In Limb









 Office Visit  2015  2:00p  Main Office as  Corinne Calvillo,  338.29  
Other Chronic



     Of 14  FNP    Pain









 719.47  Pain Joint Ankle & Foot

 

 728.71  Fibromatosis Plantar Fascia

 

 729.5  Pain In Limb

 

 724.2  Lumbago

 

 V65.42  Counseling On Substance Use & Abuse









 Office Visit  2015  3:30p  Main Office as  Maxi Samson,  338.29  Other 
Chronic



     Of 14  DO, MPH    Pain









 719.47  Pain Joint Ankle & Foot

 

 728.71  Fibromatosis Plantar Fascia









 Office Visit  2015  3:15p  Main Office as  Maxi Samson,  338.29  Other 
Chronic



     Of 14  DO, MPH    Pain









 719.47  Pain Joint Ankle & Foot

 

 729.5  Pain In Limb

 

 724.2  Lumbago

 

 V58.69  Medications Long Term (Current) Use Encounter









 Office Visit  2015 10:15a  Main Office as  Maxi Samson,  338.29  Other 
Chronic



     Of 14  DO, MPH    Pain









 719.47  Pain Joint Ankle & Foot

 

 729.5  Pain In Limb









 Office Visit  2015  2:50p  Main Office as  Corinne Calvillo  338.29  
Other Chronic



     Of 14  FNP    Pain









 719.47  Pain Joint Ankle & Foot

 

 729.5  Pain In Limb

 

 724.2  Lumbago

 

 739.3  Lesion Nonallopathic Lumbar Region Not Elsewhere Class

 

 724.1  Pain Thoracic Spine

 

 739.2  Lesion Nonallopathic Thoracic Region Not Elsewhere Class

 

 723.1  Cervicalgia

 

 739.1  Lesion Nonallopathic Cervical Region Not Elsewhere Class

 

 V58.69  Medications Long Term (Current) Use Encounter









 Office Visit  2015  4:00p  Main Office as  Maxi Samson,  338.29  Other 
Chronic



     Of 14  DO, MPH    Pain









 729.5  Pain In Limb

 

 719.47  Pain Joint Ankle & Foot









 Office Visit  2015 10:00a  Main Office as  Maxi Samson,  338.29  Other 
Chronic



     Of 14  DO, MPH    Pain









 729.5  Pain In Limb

 

 719.47  Pain Joint Ankle & Foot









 Office Visit  2015  2:45p  Main Office as  Maxi Samson,  338.29  Other 
Chronic



     Of 14  DO, MPH    Pain









 729.5  Pain In Limb

 

 719.47  Pain Joint Ankle & Foot









 Office Visit  12/15/2014  9:45a  Main Office as  Maxi Samson,  338.29  Other 
Chronic



     Of 14  DO, MPH    Pain









 719.47  Pain Joint Ankle & Foot

 

 729.5  Pain In Limb

 

 739.6  Lesion Nonallopathic Lower Extremity Not Elsewhere Class









 Office Visit  2014 10:45a  Main Office as  Maxi Samson,  338.29  Other 
Chronic



     Of 14  DO, MPH    Pain









 719.47  Pain Joint Ankle & Foot

 

 V65.42  Counseling On Substance Use & Abuse









 Office Visit  2014 11:15a  Main Office as  Maxi Samson,  V58.69  
Medications Long



     Of 14  DO, MPH    Term (Current) Use



           Encounter









 338.29  Other Chronic Pain

 

 719.47  Pain Joint Ankle & Foot

 

 V58.69  Medications Long Term (Current) Use Encounter









 Office Visit  10/22/2014 11:00a  Main Office as  Maxi Samson,  338.29  Other 
Chronic



     Of 14  DO, MPH    Pain









 719.47  Pain Joint Ankle & Foot

 

 308.3  Stress Reaction Other Acute









 Office Visit  2014  3:00p  Main Office as  Maxi Samson,  338.29  Other 
Chronic



     Of 14  DO, MPH    Pain









 719.47  Pain Joint Ankle & Foot

 

 337.9  Autonomic Nervous System Disorder Unspec

 

 V58.69  Medications Long Term (Current) Use Encounter

 

 719.44  Pain Joint Hand









 Office Visit  2014  3:30p  Main Office as  Maxi Samson,  338.29  Other 
Chronic



     Of 14  DO, MPH    Pain









 719.47  Pain Joint Ankle & Foot









 Office Visit  2014  2:45p  Main Office as  Maxi Samson  719.47  Pain 
Joint



     Of 14  DO, MPH    Ankle & Foot

 

 Office Visit  2014  4:15p  Main Office as  Maxi Samson  719.47  Pain 
Joint



     Of 14  DO, MPH    Ankle & Foot









 727.09  Synovitis & Tenosynovitis Other

 

 723.1  Cervicalgia









 Office Visit  2014  3:30p  Main Office as  Maxi Samson,  719.47  Pain 
Joint



     Of 14  DO, MPH    Ankle & Foot









 727.09  Synovitis & Tenosynovitis Other

 

 723.1  Cervicalgia

 

 739.1  Lesion Nonallopathic Cervical Region Not Elsewhere Class

 

 724.1  Pain Thoracic Spine

 

 739.2  Lesion Nonallopathic Thoracic Region Not Elsewhere Class

 

 724.2  Lumbago

 

 739.3  Lesion Nonallopathic Lumbar Region Not Elsewhere Class









 Office Visit  2014 11:00a  Main Office as  Maxi Samson  719.47  Pain 
Joint



     Of 14  DO, MPH    Ankle & Foot









 727.09  Synovitis & Tenosynovitis Other









 Office Visit  2014  9:00a  Main Office as  Maxi Samson  719.47  Pain 
Joint



     Of 14  DO, MPH    Ankle & Foot







Plan of Treatment

Future Appointment(s):2019  2:30 pm - Maxi Samson DO, MPH at Main 
Office as Of  - Maxi Samson DO, MPHG89.29 Other chronic 
painComments:Chronic. Symptoms and complaints discussed and reviewed today. No 
significant changes in physical findings.  Continue current medical pain 
management.M54.2 CervicalgiaComments:Chronic. Symptoms and complaints discussed 
and reviewed today. No significant changes in physical findings.  Continue 
current medical pain management.M99.01 Segmental and somatic dysfunction of 
cervical regionComments:Chronic. Symptoms and complaints discussed and reviewed 
today. Somatic dysfunctions noted warrantingOMT.  Continue current medical pain 
management and OMT. [id number]. OMT performed.M54.6 Pain in thoracic spineComments:
Chronic.Symptoms and complaints discussed and reviewed today. No significant 
changes in physical findings. Continue current medical pain management.M99.02 
Segmental and somatic dysfunction of thoracic regionComments:Chronic. Symptoms 
and complaints discussed and reviewed today. Notable somatic dysfunctions noted 
warranting OMT.  Continue current medical pain management and OMT. [id number]. 
OMT performed after evaluation. HVLA.M54.5 Low back painComments:Chronic. 
Symptoms and complaints discussed and reviewed today.No changes in physical 
findings. Patient is stable and comfortable when current medical therapy is 
rendered.M99.03 Segmental and somatic dysfunction of lumbar regionComments:
Chronic. Symptoms and complaints discussed and reviewed today. Lumbar somatic 
dysfunctions noted warranting OMT.  Continue current medical pain management 
and OMT.  [id number]. OMT performed after evaluation. HVLA.Z79.891 Long term (
current) use of opiate analgesicNew Labs:Urine Drug Screen, Ordered: Comments:Urine drug screen sample taken today to monitor opiate use and to 
monitor use of illicit substances.Will discuss results at next appointment.The 
following tests were ordered:6 AM, AMPH, DENIS, KANA, BUP, CARIS, COCM, COT, ETG
, FENT, MCSHSG,  OPI, OXY, PCP, TAPEN,  XTSY, ZOLP.      A urine drug test (UDT
) was ordered for this patient and collected on site today.  Creatinine has 
been ordered as well for specimen validity, not for kidney function.  
Preliminary UDT results are not final and should not be used to determine 
patient care or plan of treatment.  Initially a qualitative immunoassay screen 
will be done.  Any inconsistent or positive findings will be further tested 
with a more comprehensive quantitative confirmation LCMS study. It is part of 
the treatment process of prescribing controlled substances and is considered 
standard of care.R53.83 Other fatigueComments:Symptoms and complaints discussed 
and reviewed today. No significant changes in physical findings. Continue 
current medical pain management.    B12 injection administered after patient 
evaluated. 1ml IM for fatigue.  (See Consent for injection-B12 document for lot 
number and expiration date.)AllComments:All above symptoms and complaints 
discussed as well as diagnoses reviewed.Continue trial of opioid pain 
management - note changes below; injection therapy, osteopathic manipulation (
OMT), PT / modalities, and consults as needed to manage chronic pain.Side 
effects discussed; anticipatory guidance given. Patient clearly understands and 
agrees with all medical treatments and suggestions. All medicines prescribed 
are adequate and appropriate for this patient's complaint of pain, medical 
history, physical, and personal goals.Goals of Treatment are to provide 
adequate and appropriate multidisciplinary medical pain management to increase/ 
maintain patient's quality of life and functionality while maintaining 
satisfactory side effect profile and minimizing long term end-organ damage. 
Activity as toleratedContinue with PCP

## 2019-06-04 VITALS — DIASTOLIC BLOOD PRESSURE: 71 MMHG | SYSTOLIC BLOOD PRESSURE: 115 MMHG

## 2019-06-04 NOTE — ED
Progress





- Progress Note


Progress Note: 


The patient is a sign-out from Dr. Austin Romano MD, to Dr. Osito Watkins MD, 

at shift change at 2200 pending US. DVT Impression: No acute findings. No 

evidence of deep vein thrombosis. The patient can be discharge home with dx of 

lower extremity edema and rx of Lasix. She she agrees with this plan.





- Results/Orders


Results/Orders: 


Venous Doppler US: No acute findings. No evidence of deep vein thrombosis. ED 

physician has reviewed this radiology report.





Course/Dx





- Diagnoses


Provider Diagnoses: 


 Lower extremity edema








Discharge





- Sign-Out/Discharge


Documenting (check all that apply): Patient Departure - Patient will be 

discharged home., Receiving Sign-Out


Receiving patient FROM: Austin Romano - Patient is a sign-out from Dr. Romano at 

shift change pending CTA.


Patient Received Moderate/Deep Sedation with Procedure: No





- Discharge Plan


Condition: Stable


Disposition: HOME


Prescriptions: 


Furosemide TAB* [Lasix TAB*] 20 mg PO DAILY #15 tab


Patient Education Materials:  Leg Edema (ED)


Referrals: 


Lin Lima MD [Primary Care Provider] - 1 Week


Additional Instructions: 


RETURN TO THE EMERGENCY DEPARTMENT FOR ANY NEW OR WORSENING SYMPTOMS.





- Billing Disposition and Condition


Condition: STABLE


Disposition: Home





- Attestation Statements


Document Initiated by Yaneliibivan: Yes


Documenting Scribe: Denise Recinos


Provider For Whom René is Documenting (Include Credential): Dr. Osito Watkins MD


Scribe Attestation: 


Denise MOSER scribed for Dr. Osito Watkins MD on 06/05/19 at 0455. 


Scribe Documentation Reviewed: Yes


Provider Attestation: 


The documentation as recorded by the Denise howell accurately reflects 

the service I personally performed and the decisions made by me, Dr. Osito Watkins MD


Status of Scribivan Document: Viewed

## 2019-09-13 ENCOUNTER — HOSPITAL ENCOUNTER (EMERGENCY)
Dept: HOSPITAL 25 - ED | Age: 49
Discharge: HOME | End: 2019-09-13
Payer: MEDICARE

## 2019-09-13 VITALS — SYSTOLIC BLOOD PRESSURE: 160 MMHG | DIASTOLIC BLOOD PRESSURE: 94 MMHG
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
## 2019-09-13 DIAGNOSIS — E11.9: ICD-10-CM

## 2019-09-13 DIAGNOSIS — E03.9: ICD-10-CM

## 2019-09-13 DIAGNOSIS — I10: ICD-10-CM

## 2019-09-13 DIAGNOSIS — F41.9: ICD-10-CM

## 2019-09-13 DIAGNOSIS — F32.9: ICD-10-CM

## 2019-09-13 DIAGNOSIS — S91.201A: Primary | ICD-10-CM
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
## 2019-09-13 DIAGNOSIS — J45.909: ICD-10-CM

## 2019-09-13 PROCEDURE — 96372 THER/PROPH/DIAG INJ SC/IM: CPT

## 2019-09-13 PROCEDURE — 99282 EMERGENCY DEPT VISIT SF MDM: CPT

## 2019-09-13 PROCEDURE — 90715 TDAP VACCINE 7 YRS/> IM: CPT

## 2019-09-13 NOTE — ED
Lower Extremity





- HPI Summary


HPI Summary: 





50 yo female presents with RIGHT great toe injury. She tells me that about 

30min PTA she stubbed her toe on the washing machine and her nail bent and 

started bleeding. She put it back into place and applied a gauze dressing and 

came to the ED. She is unsure the date of her last tetanus. She is diabetic and 

is most concerned about infection and states that she "does not want to lose 

her toe". She has not taken anything OTC for her discomfort. 





- History of Current Complaint


Chief Complaint: EDExtremityLower


Stated Complaint: RIGHT FOOT BIG TOE NAIL INJURY PER PT


Time Seen by Provider: 09/13/19 21:26


Hx Obtained From: Patient


Mechanism Of Injury: Blunt Trauma


Severity Initially: Severe


Severity Currently: Severe


Pain Intensity: 9


Pain Scale Used: 0-10 Numeric


Alleviating Factor(s): Rest, Elevation


Able to Bear Weight: Yes





- Allergies/Home Medications


Allergies/Adverse Reactions: 


 Allergies











Allergy/AdvReac Type Severity Reaction Status Date / Time


 


ibuprofen Allergy Intermediate Abdominal Verified 09/13/19 21:07





   Pain  














PMH/Surg Hx/FS Hx/Imm Hx


Endocrine/Hematology History: Reports: Hx Diabetes, Hx Thyroid Disease - hyper, 

Other Endocrine/Hematological Disorders - hyper thyroid


   Denies: Hx Anemia


Cardiovascular History: Reports: Hx Hypertension


   Denies: Hx Congestive Heart Failure, Hx Pacemaker/ICD


Respiratory History: Reports: Hx Asthma


   Denies: Hx Chronic Obstructive Pulmonary Disease (COPD)


GI History: Reports: Other GI Disorders - patient states chronic nausea


   Denies: Hx Jaundice


 History: 


   Denies: Hx Dialysis, Hx Renal Disease


Musculoskeletal History: Reports: Hx Arthritis, Hx Back Problems, Hx 

Fibromyalgia


   Comment Only: Other Musculoskeletal History - fibromyalgia


Sensory History: Reports: Hx Contacts or Glasses


   Denies: Hx Hearing Aid


Opthamlomology History: Reports: Hx Contacts or Glasses


Neurological History: Reports: Hx Migraine, Hx Nerve Disease - fibromyalgia, 

Other Neuro Impairments/Disorders - vertigo


Psychiatric History: Reports: Hx Anxiety, Hx Depression, Hx Panic Disorder - 

ANXIETY, Hx Suicide Attempt


   Denies: Hx Eating Disorder





- Cancer History


Hx Chemotherapy: No


Hx Radiation Therapy: No





- Surgical History


Surgery Procedure, Year, and Place: PARTIAL HYSTERECTOMY 3/2000, TUBAL 1997, 

GALLBLADDER 1995





- Immunization History


Date of Tetanus Vaccine: Unk


Date of Influenza Vaccine: None Fall 2014


Infectious Disease History: Yes


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 

Tuberculosis - pt reports ppd positive but chest xray negative.


   Denies: Traveled Outside the [place] in Last 30 Days





- Family History


Known Family History: Positive: None - reviewed & noncontributory, Other - 

negative FHx of breast CA


   Negative: Cardiac Disease, Hypertension, Diabetes





- Social History


Alcohol Use: None


Hx Substance Use: No


Substance Use Type: Reports: None


Substance Use Comment - Amount & Last Used: Unknown


Hx Tobacco Use: No


Smoking Status (MU): Never Smoked Tobacco


Have You Smoked in the Last Year: No





Review of Systems


Constitutional: Negative


Cardiovascular: Negative


Respiratory: Negative


Musculoskeletal: Other - Right great toe pain


Skin: Negative


Neurological: Negative


Psychological: Normal


All Other Systems Reviewed And Are Negative: No





Physical Exam





- Summary


Physical Exam Summary: 





GENERAL: NAD. WDWN. No pain distress.


SKIN: RIGHT GREAT TOENAIL inferior lateral aspect with slight avulsion, but 

remains intact. No bleeding. Nail is not moveable with mild to moderate pressure


CHEST:  No accessory muscle use. Breathing comfortably and in no distress.


CV:  Pulses intact. Cap refill <2seconds


MSK: RIGHT GREAT TOE: NTTP FROM at MTP and IP. NTTP distal toe.


NEURO: Alert.


PSYCH: Age appropriate behavior.





Triage Information Reviewed: Yes


Vital Signs On Initial Exam: 


 Initial Vitals











Temp Pulse Resp BP Pulse Ox


 


 97.5 F   94   16   138/82   98 


 


 09/13/19 21:05  09/13/19 21:05  09/13/19 21:05  09/13/19 21:05  09/13/19 21:05











Vital Signs Reviewed: Yes





Diagnostics





- Vital Signs


 Vital Signs











  Temp Pulse Resp BP Pulse Ox


 


 09/13/19 21:05  97.5 F  94  16  138/82  98














- Laboratory


Lab Statement: Any lab studies that have been ordered have been reviewed, and 

results considered in the medical decision making process.





Lower Extremity Course/Dx





- Course


Course Of Treatment: Pt requests nail to be removed, but it appears quite 

stable at this point and the cuticle is only partially exposed at the lateral 

aspect. I recommended against removing the nail as it will grow and heal and is 

protecting the skin underneath. tdap was updated today. Will place pt on keflex 

for prophylactic infection. Toe dressed with telfa. Advised to f/u with 

podiatry or PCP early next week for a recheck





- Diagnoses


Provider Diagnoses: 


 Avulsion of toenail








Discharge ED





- Sign-Out/Discharge


Documenting (check all that apply): Patient Departure


Patient Received Moderate/Deep Sedation with Procedure: No





- Discharge Plan


Condition: Stable


Disposition: HOME


Prescriptions: 


Cephalexin CAP* [Keflex CAP*] 500 mg PO TID #21 cap


Patient Education Materials:  Nail Avulsion (ED)


Referrals: 


Lin Lima MD [Primary Care Provider] - 


Johnnie Bolton DPM [Doctor of Podiatric Medicine] - 1 Week


Additional Instructions: 


If you develop a fever, shortness of breath, chest pain, new or worsening 

symptoms - please call your PCP or go to the ED immediately.


 


1) Change the dressing to your nail daily





2) I recommend that you follow up with your Podiatrist early next week for a 

recheck





3) Take your antibiotic as directed





- Billing Disposition and Condition


Condition: STABLE


Disposition: Home

## 2019-09-13 NOTE — XMS REPORT
Continuity of Care Document (CCD)

 Created on:2019



Patient:Marielos Dumont

Sex:Female

:1970

External Reference #:[id number]





Demographics







 Address  [address] [place] [address]

 

 Home Phone  [phone]

 

 Mobile Phone  [phone]

 

 Work Phone  [phone]

 

 Preferred Language  en

 

 Marital Status  Declined to Specify/Unknown

 

 [employer] Affiliation  Unknown

 

 Race  Unknown

 

 Additional Race(s)  Other Race

 

 Ethnic Group  Declined to Specify/Unknown









Author







 Name  Maxi Samson DO MPH

 

 Address  [address] [place] [address],  [address]



   [place] [address]









Care Team Providers







 Name  Role  Phone

 

 Lin Lima M.D. - Family Medicine  Care Team Information   [phone]
[phone]









Problems







 Active Problems  Provider  Date

 

 Type 2 diabetes mellitus  Maxi Samson DO MPH  Onset: 2014







Social History







 Type  Date  Description  Comments

 

 Birth Sex    Unknown  

 

 Tobacco Use  Start: Unknown  Never Smoked Cigarettes  

 

 ETOH Use    Denies alcohol use  

 

 Tobacco Use  Start: Unknown  Patient has never smoked  

 

 Smoking Status  Reviewed: 19  Patient has never smoked  







Allergies, Adverse Reactions, Alerts







 Active Allergies  Reaction  Severity  Comments  Date

 

 Ibuprofen      vomitting, stomach pain  2014







Medications







 Active Medications  SIG  Qnty  Indications  Ordering  Date



         Provider  

 

 Oxycodone HCL  si by mouth  120tabs    Maxi Samson,  2015



            15mg  every 6 to 8      DO, MPH  



 Tablets  hours as        



   directed chronic        



   pain patient        

 

 Naproxen DR  prn      Unknown  



          500mg Tablets          



 DR Sotelo        Unknown  



      50mg Tablets          



           

 

 Singulair  1 po daily      Unknown  



        10mg Tablets          



           

 

 Venlafaxine HCL    30tabs    Unknown  



              225mg          



 Tablets          



           

 

 Lisinopril  1 by mouth daily      Unknown  



         5mg Tablets          



           

 

 Abilify  si by mouth      Unknown  



      2mg Tablets  every day as        



   directed        

 

 Reglan  1 by mouth every      Unknown  



     10mg Tablets  day        



           

 

 Methocarbamol  take one by      Unknown  



            500mg  mouth every 12        



 Tablets  hours as        



   directed chronic        



   pain.        

 

 Benadryl Allergy  1/2-1 by mouth      Unknown  



               25mg  three times a        



 Tablets  day as directed        



           

 

 Diazepam  si by mouth      Unknown  



       5mg Tablets  every 12 hours        



           

 

 Furosemide  by mouth every      Unknown  



         40mg Tablets  day        



           







Immunizations







 Description

 

 No Information Available







Vital Signs







 Date  Vital  Result  Comment

 

 2019  3:17pm  BP Systolic  130 mmHg  









 BP Diastolic  84 mmHg  

 

 Heart Rate  86 /min  

 

 Respiratory Rate  20 /min  

 

 Height  60 inches  5'0"

 

 Weight  191.00 lb  

 

 Pain Level  8  Pain at this time.

 

 Pain Level With Medicine  7  on average with meds

 

 Pain Level Without Medicine  9  without meds

 

 BMI (Body Mass Index)  37.3 kg/m2  









 2019  1:27pm  BP Systolic  140 mmHg  









 BP Diastolic  86 mmHg  

 

 Heart Rate  82 /min  

 

 Respiratory Rate  20 /min  

 

 Height  60 inches  5'0"

 

 Weight  192.00 lb  

 

 Pain Level  8  Pain at this time.

 

 Pain Level With Medicine  7  on average with meds

 

 Pain Level Without Medicine  9  without meds

 

 BMI (Body Mass Index)  37.5 kg/m2  







Results







 Description

 

 No Information Available







Procedures







 Date  Code  Description  Status

 

 2019  54216  Omt  1-2 Body Regions  Completed

 

 2019  83132  Therapeutic, Prophylactic Or Diagnostic Injection Subq/Im  
Completed

 

 2019  84263  Injection For Nerve Block, Other Peripheral Nerve Or  
Completed



     Branch  

 

 2019  93385  Injection, Single Or Mutiple Trigger Points One Or Two  
Completed



     Muscles  

 

 2019  42646  Inject Tendon/Ligament  Completed

 

 2019  95548  Inject Tendon/Ligament  Completed

 

 2019  03257  Inject Tendon/Ligament  Completed

 

 2019  55584  Inject Tendon/Ligament  Completed

 

 2019  52358  Arthrocentesis/Aspiration/Inj Of Major Joint Or Bursa W/  
Completed



     Ultra  

 

 2019  72237  Omt  3-4 Body Regions  Completed

 

 2019  54146  Therapeutic, Prophylactic Or Diagnostic Injection Subq/Im  
Completed

 

 2019  26082  Therapeutic, Prophylactic Or Diagnostic Injection Subq/Im  
Completed

 

 2019  74514  Brief Emotional/Behav Assessment W/ Scoring Doc Per  
Completed



     Standard Inst  

 

 2019  49268  Inject/Drain Arthrocentesis Small Joint/Bursa  Completed







Medical Devices







 Description

 

 No Information Available







Encounters







 Type  Date  Location  Provider  Dx  Diagnosis

 

 Office Visit  2019  Main Office as Of  Samson, Maxi, DO,  G89.29  Other 
chronic pain



   1:15p  14  MP[facility]    









 M54.5  Low back pain

 

 M99.03  Segmental and somatic dysfunction of lumbar region

 

 M53.3  Sacrococcygeal disorders, not elsewhere classified

 

 M99.04  Segmental and somatic dysfunction of sacral region

 

 M54.17  Radiculopathy, lumbosacral region

 

 M65.88  Other synovitis and tenosynovitis, other site

 

 M79.18  Myalgia, other site

 

 Z79.891  Long term (current) use of opiate analgesic

 

 R53.83  Other fatigue









 Office Visit  2019  2:30p  Main Office as  Maxi Samson,  G89.29  Other 
chronic



     Of 14  DO, MPH    pain









 M54.2  Cervicalgia

 

 M54.6  Pain in thoracic spine

 

 M70.22  Olecranon bursitis, left elbow

 

 Z79.891  Long term (current) use of opiate analgesic









 Office Visit  2019  2:30p  Main Office as  Maxi Samson  G89.29  Other 
chronic



     Of 14  DO, MPH    pain









 M54.2  Cervicalgia

 

 M99.01  Segmental and somatic dysfunction of cervical region

 

 M54.6  Pain in thoracic spine

 

 M99.02  Segmental and somatic dysfunction of thoracic region

 

 M54.5  Low back pain

 

 M99.03  Segmental and somatic dysfunction of lumbar region

 

 Z79.891  Long term (current) use of opiate analgesic

 

 R53.83  Other fatigue









 Office Visit  2019  2:45p  Main Office as  Maxi Samson,  G89.29  Other 
chronic



     Of 14  DO, MPH    pain









 M54.2  Cervicalgia

 

 M54.6  Pain in thoracic spine

 

 M54.5  Low back pain

 

 R53.83  Other fatigue

 

 Z79.891  Long term (current) use of opiate analgesic

 

 Z71.89  Other specified counseling









 Office Visit  2019  1:30p  Main Office as  Maxi Samson  G89.29  Other 
chronic



     Of 14  DO, MPH    pain









 M54.2  Cervicalgia

 

 M54.6  Pain in thoracic spine

 

 M54.5  Low back pain

 

 M53.3  Sacrococcygeal disorders, not elsewhere classified

 

 Z13.31  Encounter for screening for depression

 

 Z79.891  Long term (current) use of opiate analgesic









 Office Visit  2019  2:45p  Main Office as  Maxi Samson,  G89.29  Other 
chronic



     Of 14  DO, MPH    pain









 M54.2  Cervicalgia

 

 M54.6  Pain in thoracic spine

 

 M54.5  Low back pain

 

 Z79.891  Long term (current) use of opiate analgesic







Assessments







 Date  Code  Description  Provider

 

 2019  G89.29  Other chronic pain  SamsonSimon copeph, DO, MPH

 

 2019  M54.2  Cervicalgia  Samson, Maxi, DO, MPH

 

 2019  M99.01  Segmental and somatic dysfunction of  Samson, Maxi, DO, MPH



     cervical region  

 

 2019  M54.6  Pain in thoracic spine  Samson, Maxi, DO, MPH

 

 2019  M99.02  Segmental and somatic dysfunction of  Samson, Maxi, DO, MPH



     thoracic region  

 

 2019  M54.5  Low back pain  Samson, Maxi, DO, MPH

 

 2019  M99.03  Segmental and somatic dysfunction of lumbar  SamsonSimon copeph, 
DO, MPH



     region  

 

 2019  R53.83  Other fatigue  Samson, Maxi, DO, MPH

 

 2019  Z79.891  Long term (current) use of opiate analgesic  Samson, Maxi
, DO, MPH

 

 2019  G89.29  Other chronic pain  Samson, Maxi, DO, MPH

 

 2019  M54.5  Low back pain  Samson, Maxi, DO, MPH

 

 2019  M99.03  Segmental and somatic dysfunction of lumbar  Samson, Maxi, 
DO, MPH



     region  

 

 2019  M53.3  Sacrococcygeal disorders, not elsewhere  Samson, Maxi, DO, 
MPH



     classified  

 

 2019  M99.04  Segmental and somatic dysfunction of sacral  Samson, Maxi, 
DO, MPH



     region  

 

 2019  M54.17  Radiculopathy, lumbosacral region  Samson, Maxi, DO, MPH

 

 2019  M65.88  Other synovitis and tenosynovitis, other  Samson, Maxi, DO
, MPH



     site  

 

 2019  M79.18  Myalgia, other site  Samson, Maxi, DO, MPH

 

 2019  Z79.891  Long term (current) use of opiate analgesic  Samson, Maxi
, DO, MPH

 

 2019  R53.83  Other fatigue  Samson, Maxi, DO, MPH

 

 2019  G89.29  Other chronic pain  Samson, Maxi, DO, MPH

 

 2019  M54.2  Cervicalgia  Samson, Maxi, DO, MPH

 

 2019  M54.6  Pain in thoracic spine  Samson, Maxi, DO, MPH

 

 2019  M70.22  Olecranon bursitis, left elbow  Samson, Maxi, DO, MPH

 

 2019  Z79.891  Long term (current) use of opiate analgesic  Samson, Maxi
, DO, MPH

 

 2019  G89.29  Other chronic pain  Samson, Maxi, DO, MPH

 

 2019  M54.2  Cervicalgia  Samson, Maxi, DO, MPH

 

 2019  M99.01  Segmental and somatic dysfunction of  Samson, Maxi, DO, MPH



     cervical region  

 

 2019  M54.6  Pain in thoracic spine  Samson, Maxi, DO, MPH

 

 2019  M99.02  Segmental and somatic dysfunction of  Samson, Maxi, DO, MPH



     thoracic region  

 

 2019  M54.5  Low back pain  Samson, Maxi, DO, MPH

 

 2019  M99.03  Segmental and somatic dysfunction of lumbar  Samson, Maxi, 
DO, MPH



     region  

 

 2019  Z79.891  Long term (current) use of opiate analgesic  Samson, Maxi
, DO, MPH

 

 2019  R53.83  Other fatigue  Samson, Maxi, DO, MPH

 

 2019  G89.29  Other chronic pain  Samson, Maxi, DO, MPH

 

 2019  M54.2  Cervicalgia  Samson, Maxi, DO, MPH

 

 2019  M54.6  Pain in thoracic spine  Samson, Maxi, DO, MPH

 

 2019  M54.5  Low back pain  Samson, Maxi, DO, MPH

 

 2019  R53.83  Other fatigue  Samson, Maxi, DO, MPH

 

 2019  Z79.891  Long term (current) use of opiate analgesic  SamsonMaxi cope DO, MPH

 

 2019  Z71.89  Other specified counseling  Maxi Samson DO, MPH

 

 2019  G89.29  Other chronic pain  Maxi Samson DO, MPH

 

 2019  M54.2  Cervicalgia  Maxi Samson DO, MPH

 

 2019  M54.6  Pain in thoracic spine  SamsonMaxi cope DO, MPH

 

 2019  M54.5  Low back pain  Maxi Samson DO, MPH

 

 2019  M53.3  Sacrococcygeal disorders, not elsewhere  Maxi Samson DO, 
MPH



     classified  

 

 2019  Z13.31  Encounter for screening for depression  Maxi Samson DO, 
MPH

 

 2019  Z79.891  Long term (current) use of opiate analgesic  Maxi Samson DO, MPH

 

 2019  G89.29  Other chronic pain  Maxi Samson DO, MPH

 

 2019  M54.2  Cervicalgia  Maxi Samson DO, MPH

 

 2019  M54.6  Pain in thoracic spine  SamsonMaxi cope DO, MPH

 

 2019  M54.5  Low back pain  Maxi Samson DO, MPH

 

 2019  Z79.891  Long term (current) use of opiate analgesic  Maxi Samson DO, MPH







Plan of Treatment

Future Appointment(s):2019  3:15 pm - Maxi Samson DO, MPH at Main 
Office as Of  - Maxi Samson DO, MPHG89.29 Other chronic 
painComments:Chronic. Symptoms and complaints discussed and reviewed today. No 
significant changes in physical findings.  Continue current medical pain 
management.M54.2 CervicalgiaComments:Chronic. Symptoms and complaints discussed 
and reviewed today. No significant changes in physical findings.  Continue 
current medical pain management.M99.01 Segmental and somatic dysfunction of 
cervical regionComments:Chronic. Symptoms and complaints discussed and reviewed 
today. Somatic dysfunctions noted warrantingOMT.  Continue current medical pain 
management and OMT. [id number]. OMT performed.M54.6 Pain in thoracic spineComments:
Chronic.Symptoms and complaints discussed and reviewed today. No significant 
changes in physical findings. Continue current medical pain management.M99.02 
Segmental and somatic dysfunction of thoracic regionComments:Chronic. Symptoms 
and complaints discussed and reviewed today. Notable somatic dysfunctions noted 
warranting OMT.  Continue current medical pain management and OMT. [id number]. 
OMT performed after evaluation. HVLA.M54.5 Low back painComments:Chronic. 
Symptoms and complaints discussed and reviewed today.No changes in physical 
findings. Patient is stable and comfortable when current medical therapy is 
rendered.M99.03 Segmental and somatic dysfunction of lumbar regionComments:
Chronic. Symptoms and complaints discussed and reviewed today. Lumbar somatic 
dysfunctions noted warranting OMT.  Continue current medical pain management 
and OMT.  [id number]. OMT performed after evaluation. HVLA.R53.83 Other 
fatigueComments:Symptoms and complaints discussed and reviewed today. No 
significant changes in physical findings. Continue current medical pain 
management.    B12 injection administered after patient evaluated. 1ml IM for 
fatigue.  (See Consent for injection-B12 document for lot number and expiration 
date.)Z79.891 Long term (current) use of opiate analgesicNew Labs:Urine Drug 
Screen, Ordered: 19Comments:Urine drug screen sample taken today to 
monitor opiate use and to monitor use of illicit substances.Will discuss 
results at next appointment.The following tests were ordered:6 AM, AMPH, DENIS, 
KANA, BUP, CARIS, COCM, COT, ETG, FENT, MCSHSG,  OPI, OXY, PCP, TAPEN,  XTSY, 
ZOLP.      A urine drug test (UDT) was ordered for this patient and collected 
on site today.  Creatinine has been ordered as well for specimen validity, not 
for kidney function.  Preliminary UDT results are not final and should not be 
used to determine patient care or plan of treatment.  Initially a qualitative 
immunoassay screen will be done.  Any inconsistent or positive findings will be 
further tested with a more comprehensive quantitative confirmation LCMS study. 
It is part of the treatment process of prescribing controlled substances and is 
considered standard of care.AllComments:Continue current medical pain management
;  injection therapy, osteopathic manipulation, PT / modalities, and consults 
as needed to manage chronic pain.Non - opioid pain management discussed and 
optionsdiscussed.Side effects discussed; anticipatory guidance given. Patient 
clearly understand and agree with all medical treatments and suggestions. All 
medicines prescribed are adequate and appropriate for this patient's complaint 
of pain, medical history, physical, and personal goals.Goals of Treatment are 
to provide adequate and appropriate multidisciplinary medical pain management 
to increase/ maintain patient's quality of life and functionality while 
maintaining satisfactory side effect profile andminimizing long term end-organ 
damage. Importance of regular nutrition throughout the day discussed.Activity 
as toleratedContinue with PCP



Functional Status







 Description

 

 No Information Available







Mental Status







 Description

 

 No Information Available







Referrals







 Description

 

 No Information Available

## 2020-01-02 ENCOUNTER — HOSPITAL ENCOUNTER (EMERGENCY)
Dept: HOSPITAL 25 - ED | Age: 50
Discharge: HOME | End: 2020-01-02
Payer: MEDICARE

## 2020-01-02 VITALS — DIASTOLIC BLOOD PRESSURE: 82 MMHG | SYSTOLIC BLOOD PRESSURE: 147 MMHG

## 2020-01-02 DIAGNOSIS — E11.9: ICD-10-CM

## 2020-01-02 DIAGNOSIS — F41.9: ICD-10-CM

## 2020-01-02 DIAGNOSIS — J45.909: ICD-10-CM

## 2020-01-02 DIAGNOSIS — Z98.51: ICD-10-CM

## 2020-01-02 DIAGNOSIS — E05.90: ICD-10-CM

## 2020-01-02 DIAGNOSIS — B34.9: Primary | ICD-10-CM

## 2020-01-02 DIAGNOSIS — Z90.49: ICD-10-CM

## 2020-01-02 DIAGNOSIS — F32.9: ICD-10-CM

## 2020-01-02 DIAGNOSIS — Z90.711: ICD-10-CM

## 2020-01-02 DIAGNOSIS — Z88.8: ICD-10-CM

## 2020-01-02 DIAGNOSIS — Z79.899: ICD-10-CM

## 2020-01-02 DIAGNOSIS — I10: ICD-10-CM

## 2020-01-02 LAB
ALBUMIN SERPL BCG-MCNC: 4.2 G/DL (ref 3.2–5.2)
ALBUMIN/GLOB SERPL: 1.3 {RATIO} (ref 1–3)
ALP SERPL-CCNC: 126 U/L (ref 34–104)
ALT SERPL W P-5'-P-CCNC: 144 U/L (ref 7–52)
ANION GAP SERPL CALC-SCNC: 11 MMOL/L (ref 2–11)
AST SERPL-CCNC: 112 U/L (ref 13–39)
BASOPHILS # BLD AUTO: 0 10^3/UL (ref 0–0.2)
BUN SERPL-MCNC: 13 MG/DL (ref 6–24)
BUN/CREAT SERPL: 19.4 (ref 8–20)
CALCIUM SERPL-MCNC: 9.3 MG/DL (ref 8.6–10.3)
CHLORIDE SERPL-SCNC: 105 MMOL/L (ref 101–111)
EOSINOPHIL # BLD AUTO: 0 10^3/UL (ref 0–0.6)
GLOBULIN SER CALC-MCNC: 3.3 G/DL (ref 2–4)
GLUCOSE SERPL-MCNC: 119 MG/DL (ref 70–100)
HCO3 SERPL-SCNC: 24 MMOL/L (ref 22–32)
HCT VFR BLD AUTO: 40 % (ref 35–47)
HGB BLD-MCNC: 13.9 G/DL (ref 12–16)
LYMPHOCYTES # BLD AUTO: 1.7 10^3/UL (ref 1–4.8)
MCH RBC QN AUTO: 30 PG (ref 27–31)
MCHC RBC AUTO-ENTMCNC: 35 G/DL (ref 31–36)
MCV RBC AUTO: 86 FL (ref 80–97)
MONOCYTES # BLD AUTO: 0.7 10^3/UL (ref 0–0.8)
NEUTROPHILS # BLD AUTO: 3.9 10^3/UL (ref 1.5–7.7)
NRBC # BLD AUTO: 0 10^3/UL
NRBC BLD QL AUTO: 0.1
PLATELET # BLD AUTO: 300 10^3/UL (ref 150–450)
POTASSIUM SERPL-SCNC: 3.2 MMOL/L (ref 3.5–5)
PROT SERPL-MCNC: 7.5 G/DL (ref 6.4–8.9)
RBC # BLD AUTO: 4.65 10^6 /UL (ref 3.7–4.87)
SODIUM SERPL-SCNC: 140 MMOL/L (ref 135–145)
TROPONIN I SERPL-MCNC: 0 NG/ML (ref ?–0.03)
WBC # BLD AUTO: 6.4 10^3/UL (ref 3.5–10.8)

## 2020-01-02 PROCEDURE — 83605 ASSAY OF LACTIC ACID: CPT

## 2020-01-02 PROCEDURE — 96360 HYDRATION IV INFUSION INIT: CPT

## 2020-01-02 PROCEDURE — 96361 HYDRATE IV INFUSION ADD-ON: CPT

## 2020-01-02 PROCEDURE — 99284 EMERGENCY DEPT VISIT MOD MDM: CPT

## 2020-01-02 PROCEDURE — 84484 ASSAY OF TROPONIN QUANT: CPT

## 2020-01-02 PROCEDURE — 80053 COMPREHEN METABOLIC PANEL: CPT

## 2020-01-02 PROCEDURE — 76705 ECHO EXAM OF ABDOMEN: CPT

## 2020-01-02 PROCEDURE — 71045 X-RAY EXAM CHEST 1 VIEW: CPT

## 2020-01-02 PROCEDURE — 85025 COMPLETE CBC W/AUTO DIFF WBC: CPT

## 2020-01-02 PROCEDURE — 93005 ELECTROCARDIOGRAM TRACING: CPT

## 2020-01-02 PROCEDURE — 36415 COLL VENOUS BLD VENIPUNCTURE: CPT

## 2020-01-02 PROCEDURE — 85379 FIBRIN DEGRADATION QUANT: CPT

## 2020-01-02 RX ADMIN — SODIUM CHLORIDE ONE MLS/HR: 900 IRRIGANT IRRIGATION at 20:20

## 2020-01-02 NOTE — XMS REPORT
Continuity of Care Document (CCD)

 Created on:2019



Patient:Marielos Dumont

Sex:Female

:1970

External Reference #:[id number]





Demographics







 Address  [address] [place] [address]

 

 Home Phone  [phone]

 

 Mobile Phone  [phone]

 

 Work Phone  [phone]

 

 Preferred Language  en

 

 Marital Status  Declined to Specify/Unknown

 

 Yarsani Affiliation  Unknown

 

 Race  Unknown

 

 Additional Race(s)  Other Race

 

 Ethnic Group  Declined to Specify/Unknown









Author







 Name  Maxi Samson DO MPH

 

 Address  [address] [place] [address],  [address]



   [place] [address]









Care Team Providers







 Name  Role  Phone

 

 Lin Lima M.D. - Family Medicine  Care Team Information   [phone]
[phone]









Problems







 Active Problems  Provider  Date

 

 Type 2 diabetes mellitus  Maxi Samson DO MPH  Onset: 2014







Social History







 Type  Date  Description  Comments

 

 Birth Sex    Unknown  

 

 Tobacco Use  Start: Unknown  Never Smoked Cigarettes  

 

 ETOH Use    Denies alcohol use  

 

 Tobacco Use  Start: Unknown  Patient has never smoked  

 

 Smoking Status  Reviewed: 12/10/19  Patient has never smoked  







Allergies, Adverse Reactions, Alerts







 Active Allergies  Reaction  Severity  Comments  Date

 

 Ibuprofen      vomitting, stomach pain  2014







Medications







 Active Medications  SIG  Qnty  Indications  Ordering  Date



         Provider  

 

 Oxycodone HCL  si by mouth  150tabs    Maxi Samson,  2015



            15mg  every 6 to 8      DO, MPH  



 Tablets  hours as directed        



   chronic pain        



   patient,        



   temporary        



   increase        

 

 Naproxen DR  prn      Unknown  



          500mg Tablets          



 DR Sotelo        Unknown  



      50mg Tablets          



           

 

 Singulair  1 po daily      Unknown  



        10mg Tablets          



           

 

 Venlafaxine HCL    30tabs    Unknown  



              225mg          



 Tablets          



           

 

 Lisinopril  1 by mouth daily      Unknown  



         5mg Tablets          



           

 

 Abilify  si by mouth      Unknown  



      2mg Tablets  every day as        



   directed        

 

 Reglan  1 by mouth every      Unknown  



     10mg Tablets  day        



           

 

 Methocarbamol  take one by mouth      Unknown  



            500mg  every 12 hours as        



 Tablets  directed chronic        



   pain.        

 

 Benadryl Allergy  1/2-1 by mouth      Unknown  



               25mg  three times a day        



 Tablets  as directed        



           

 

 Furosemide  by mouth every      Unknown  



         40mg Tablets  day        



           

 

 Chlorpromazine HCL    60tabs    Unknown  



                 25mg          



 Tablets          



           

 

 Gabapentin  si by mouth  90tabs    Unknown  



         600mg Tablets  three times a day        



   as directed        







Immunizations







 Description

 

 No Information Available







Vital Signs







 Date  Vital  Result  Comment

 

 12/10/2019  3:24pm  BP Systolic  130 mmHg  









 BP Diastolic  78 mmHg  

 

 Heart Rate  80 /min  

 

 Respiratory Rate  20 /min  

 

 Height  60 inches  5'0"

 

 Weight  201.00 lb  

 

 Pain Level  7  Pain at this time.

 

 Pain Level With Medicine  6  on average with meds

 

 Pain Level Without Medicine  9  without meds

 

 BMI (Body Mass Index)  39.3 kg/m2  









 2019  9:14am  BP Systolic  142 mmHg  









 BP Diastolic  86 mmHg  

 

 Heart Rate  84 /min  

 

 Respiratory Rate  20 /min  

 

 Height  60 inches  5'0"

 

 Weight  202.00 lb  

 

 Pain Level  6  Pain at this time.

 

 Pain Level With Medicine  5  on average with meds

 

 Pain Level Without Medicine  9  without meds

 

 BMI (Body Mass Index)  39.4 kg/m2  







Results







 Description

 

 No Information Available







Procedures







 Date  Code  Description  Status

 

 2019  07402  Omt  3-4 Body Regions  Completed

 

 2019  88712  Therapeutic, Prophylactic Or Diagnostic Injection Subq/Im  
Completed

 

 2019  53527  Omt  1-2 Body Regions  Completed

 

 2019  00855  Therapeutic, Prophylactic Or Diagnostic Injection Subq/Im  
Completed

 

 2019  63904  Injection For Nerve Block, Other Peripheral Nerve Or  
Completed



     Branch  

 

 201952  Injection, Single Or Mutiple Trigger Points One Or Two  
Completed



     Muscles  

 

 2019  60881  Inject Tendon/Ligament  Completed

 

 2019  73672  Inject Tendon/Ligament  Completed

 

 201950  Inject Tendon/Ligament  Completed

 

 201950  Inject Tendon/Ligament  Completed

 

 201911  Arthrocentesis/Aspiration/Inj Of Major Joint Or Bursa W/  
Completed



     Ultra  







Medical Devices







 Description

 

 No Information Available







Encounters







 Type  Date  Location  Provider  Dx  Diagnosis

 

 Office Visit  2019  Main Office as Of  Maxi Samson DO,  G89.29  Other 
chronic pain



   9:30a  14  [facility]    









 M54.2  Cervicalgia

 

 M54.5  Low back pain

 

 M54.6  Pain in thoracic spine

 

 Z79.891  Long term (current) use of opiate analgesic









 Office Visit  2019 11:15a  Main Office as  Maxi Samson,  G89.29  Other 
chronic



     Of 14  DO, MPH    pain









 M54.2  Cervicalgia

 

 M54.6  Pain in thoracic spine

 

 M54.5  Low back pain

 

 M53.3  Sacrococcygeal disorders, not elsewhere classified

 

 Z79.891  Long term (current) use of opiate analgesic









 Office Visit  2019  3:15p  Main Office as  Maxi Samson,  G89.29  Other 
chronic



     Of 14  DO, MPH    pain









 M54.2  Cervicalgia

 

 M99.01  Segmental and somatic dysfunction of cervical region

 

 M54.6  Pain in thoracic spine

 

 M99.02  Segmental and somatic dysfunction of thoracic region

 

 M54.5  Low back pain

 

 M99.03  Segmental and somatic dysfunction of lumbar region

 

 R53.83  Other fatigue

 

 Z79.891  Long term (current) use of opiate analgesic









 Office Visit  2019  1:15p  Main Office as  Maxi Samson,  G89.29  Other 
chronic



     Of 14  DO, MPH    pain









 M54.5  Low back pain

 

 M99.03  Segmental and somatic dysfunction of lumbar region

 

 M53.3  Sacrococcygeal disorders, not elsewhere classified

 

 M99.04  Segmental and somatic dysfunction of sacral region

 

 M54.17  Radiculopathy, lumbosacral region

 

 M65.88  Other synovitis and tenosynovitis, other site

 

 M79.18  Myalgia, other site

 

 Z79.891  Long term (current) use of opiate analgesic

 

 R53.83  Other fatigue









 Office Visit  2019  2:30p  Main Office as  Maxi Samson,  G89.29  Other 
chronic



     Of 14  DO, MPH    pain









 M54.2  Cervicalgia

 

 M54.6  Pain in thoracic spine

 

 M70.22  Olecranon bursitis, left elbow

 

 Z79.891  Long term (current) use of opiate analgesic







Assessments







 Date  Code  Description  Provider

 

 12/10/2019  G89.29  Other chronic pain  Maxi Samson DO, MPH

 

 12/10/2019  M54.2  Cervicalgia  Maxi Samson DO, MPH

 

 12/10/2019  M54.5  Low back pain  Samson, Maxi, DO, MPH

 

 12/10/2019  M54.6  Pain in thoracic spine  Samson, Maxi, DO, MPH

 

 12/10/2019  Z79.891  Long term (current) use of opiate analgesic  Samson, Maxi
, DO, MPH

 

 2019  G89.29  Other chronic pain  Samson, Maxi, DO, MPH

 

 2019  M54.2  Cervicalgia  Samson, Maxi, DO, MPH

 

 2019  M54.5  Low back pain  Samson, Maxi, DO, MPH

 

 2019  M54.6  Pain in thoracic spine  Samson, Maxi, DO, MPH

 

 2019  Z79.891  Long term (current) use of opiate analgesic  Samson, Maxi
, DO, MPH

 

 2019  G89.29  Other chronic pain  Samson, Maxi, DO, MPH

 

 2019  M54.2  Cervicalgia  Samson, Maxi, DO, MPH

 

 2019  M54.6  Pain in thoracic spine  Samson, Maxi, DO, MPH

 

 2019  M54.5  Low back pain  Samson, Maxi, DO, MPH

 

 2019  M53.3  Sacrococcygeal disorders, not elsewhere  Samson, Maxi, DO, 
MP[facility]



     classified  

 

 2019  Z79.891  Long term (current) use of opiate analgesic  Samson, Maxi
, DO, MPH

 

 2019  G89.29  Other chronic pain  Samson, Maxi, DO, MPH

 

 2019  M54.2  Cervicalgia  Samson, Maxi, DO, MPH

 

 2019  M99.01  Segmental and somatic dysfunction of  Samson, Maxi, DO, MPH



     cervical region  

 

 2019  M54.6  Pain in thoracic spine  Samson, Maxi, DO, MPH

 

 2019  M99.02  Segmental and somatic dysfunction of  Samson, Maxi, DO, MPH



     thoracic region  

 

 2019  M54.5  Low back pain  Samson, Maxi, DO, MPH

 

 2019  M99.03  Segmental and somatic dysfunction of lumbar  Samson, Maxi, 
DO, MPH



     region  

 

 2019  R53.83  Other fatigue  Samson, Maxi, DO, MPH

 

 2019  Z79.891  Long term (current) use of opiate analgesic  SamsonMaxi cope DO, MPH

 

 2019  G89.29  Other chronic pain  SamsonMaxi cope DO, MPH

 

 2019  M54.5  Low back pain  Maxi Samson DO, MPH

 

 2019  M99.03  Segmental and somatic dysfunction of lumbar  SamsonMaxi cope DO, MPH



     region  

 

 2019  M53.3  Sacrococcygeal disorders, not elsewhere  SamsonMaxi cope DO, 
MP[facility]



     classified  

 

 2019  M99.04  Segmental and somatic dysfunction of sacral  SamsonMaxi cope 
DO, MPH



     region  

 

 2019  M54.17  Radiculopathy, lumbosacral region  Maxi Samson DO, MPH

 

 2019  M65.88  Other synovitis and tenosynovitis, other  SamsonMaxi cope DO
, MPH



     site  

 

 2019  M79.18  Myalgia, other site  SamsonMaxi cope DO, MPH

 

 2019  Z79.891  Long term (current) use of opiate analgesic  SamsonMaxi cope DO, MPH

 

 2019  R53.83  Other fatigue  SamsonMaxi cope DO, MPH

 

 2019  G89.29  Other chronic pain  SamsonMaxi cope DO, MPH

 

 2019  M54.2  Cervicalgia  Maxi Samson DO, MPH

 

 2019  M54.6  Pain in thoracic spine  Maxi Samson DO, MPH

 

 2019  M70.22  Olecranon bursitis, left elbow  Maxi Samson DO, MPH

 

 2019  Z79.891  Long term (current) use of opiate analgesic  Maxi Samson DO, MPH







Plan of Treatment

Future Appointment(s):2020  3:00 pm - Maxi Samson DO MPH at Main 
Office as Of 1412/10/2019 - Maxi Samson DO, MPHG89.29 Other chronic 
painComments:Chronic. Symptoms and complaints discussed and reviewed today. No 
significant changes in physical findings.  Continue current medical pain 
management.M54.2 CervicalgiaComments:Chronic. Symptoms and complaints discussed 
and reviewed today. No significant changes in physical findings.  Continue 
current medical pain management.M54.5 Low back painComments:Chronic. Symptoms 
and complaints discussed and reviewed today.No changes in physical findings. 
Patient is stable and comfortable when current medical therapy is 
rendered.M54.6 Pain in thoracic spineComments:Chronic.Symptoms and complaints 
discussed and reviewed today. No significant changes in physical findings. 
Continue current medical pain management.Z79.891 Long term (current) use of 
opiate analgesicNew Labs:Urine Drug Screen, Ordered: 12/10/19Comments:Urine 
drug screen sample taken today to monitor opiate use and to monitor use of 
illicit substances.Will discuss results at next appointment.The following tests 
were ordered:6 AM, AMPH, DENIS, KANA, BUP, CARIS, COCM, ETG, FENT, MCSHSG,  OPI, 
OXY, PCP, TAPEN,  XTSY, ZOLP.      A urine drug test (UDT) was ordered for this 
patient and collected on site today.  Creatinine has been ordered as well for 
specimen validity, not for kidney function.  Preliminary UDT results are not 
final and should not be used to determine patient care or plan of treatment.  
Initially a qualitative immunoassay screen will bedone.  Any inconsistent or 
positive findings will be further tested with a more comprehensive quantitative 
confirmation LCMS study. It is part of the treatment process of prescribing 
controlled substances and is considered standard of care.AllComments:Continue 
current medical pain management;  injection therapy, osteopathic manipulation, 
PT / modalities, and consults as needed to manage chronic pain.Non - opioid 
pain management discussed and optionsdiscussed.Side effects discussed; 
anticipatory guidance given. Patient clearly understand and agree with all 
medical treatments and suggestions. All medicines prescribed are adequate and 
appropriate for this patient's complaint of pain, medical history, physical, 
and personal goals.Goals of Treatment are to provide adequate and appropriate 
multidisciplinary medical pain management to increase/ maintain patient's 
quality of life and functionality while maintaining satisfactory side effect 
profile andminimizing long term end-organ damage. Importance of regular 
nutrition throughout the day discussed.Activity as toleratedContinue with PCP



Functional Status







 Description

 

 No Information Available







Mental Status







 Description

 

 No Information Available







Referrals







 Description

 

 No Information Available

## 2020-01-02 NOTE — ED
HPI Chest Pain





- HPI Summary


HPI Summary: 





This pt is a 50 Y/O F presenting to [facility] with a CC of L anterior CP that is 

rated a 9/10 and started at 0930 this morning. She states that the pain 

radiates into her L neck, back and jaw. She was prescribed nitro by her PCP and 

took 2 this morning without relief. She received 325 ASA enroute to [facility] 

without relief. She states that she used to see Dr. Goddard for her heart. She 

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 

results in CP. She states that this is the first episode for a couple months. 

She states that she hasnt been eating well. She denies any fevers, chills, SOB

, N/V, and headaches. She states that she has a PMHx of fibromyalgia, DM, HTN, 

Asthma. She states no alleviating factors. She states that her pain is 

increased with palpation and stress. 





- History of Current Complaint


Chief Complaint: EDChestPainROMI


Time Seen by Provider: 01/02/20 14:18


Hx Obtained From: Patient


Onset/Duration: Started Hours Ago - 5, Still Present


Time of Onset: 09:30


Timing: Constant


Initial Severity: Severe


Current Severity: Severe


Pain Intensity: 9


Chest Pain Location: Left Anterior


Chest Pain Radiates: Yes


Chest Pain Radiates To:: Back - L, Jaw - L, Neck - L


Aggravating Factor(s): Other: - palpation, stress


Alleviating Factor(s): Nothing


Associated Signs and Symptoms: Positive: Chest Pain.  Negative: Headaches, 

Shortness of Breath, Fever, Chills, Nausea, Vomiting





- Additional Pertinent History


Primary Care Physician: BRAULIO





- Allergy/Home Medications


Allergies/Adverse Reactions: 


 Allergies











Allergy/AdvReac Type Severity Reaction Status Date / Time


 


ibuprofen Allergy Intermediate Abdominal Verified 01/02/20 14:24





   Pain  











Home Medications: 


 Home Medications





Albuterol HFA INHALER* [Ventolin HFA Inhaler*] 2 puff INH QID PRN 01/02/20 [

History Confirmed 01/02/20]


Atorvastatin* [Lipitor*] 20 mg PO DAILY 01/02/20 [History Confirmed 01/02/20]


Cetirizine* [ZyrTEC 10 MG TAB*] 10 mg PO DAILY 01/02/20 [History Confirmed 01/02 /20]


Cyanocobalamin TAB* [Vitamin B12 TAB*] 1,000 mcg PO DAILY 01/02/20 [History 

Confirmed 01/02/20]


Gabapentin TAB(NF) [Neurontin 600 mg TAB(NF)] 1,200 mg PO BEDTIME 01/02/20 [

History Confirmed 01/02/20]


Lisinopril TAB* [Prinivil TAB 5 MG*] 10 mg PO DAILY 01/02/20 [History Confirmed 

01/02/20]


Magnesium Oxide TAB* [MagOx 400 TAB*] 500 mg PO DAILY 01/02/20 [History 

Confirmed 01/02/20]


Metoclopramide TAB* [Reglan TAB*] 10 mg PO Q8H PRN 01/02/20 [History Confirmed 

01/02/20]


Mometasone Furoate 0.1 % TOPICAL BID 01/02/20 [History Confirmed 01/02/20]


Montelukast Sodium TAB* [Singulair TAB*] 10 mg PO DAILY 01/02/20 [History 

Confirmed 01/02/20]


Mupirocin 2% OINT* [Bactroban 2 % Oint*] 1 applic TOPICAL BID 01/02/20 [History 

Confirmed 01/02/20]


Naproxen [Naproxen 500 mg tab] 500 mg PO Q12H 01/02/20 [History Confirmed 01/02/ 20]


Ranitidine TAB (NF) [Zantac TAB (NF)] 150 mg PO BID 01/02/20 [History Confirmed 

01/02/20]


Venlafaxine ER (NF) [Effexor ER (NF)] 150 mg PO DAILY 01/02/20 [History 

Confirmed 01/02/20]


Venlafaxine EXT RELEASE CAP* [Effexor Xr CAP*] 75 mg PO DAILY 01/02/20 [History 

Confirmed 01/02/20]











PMH/Surg Hx/FS Hx/Imm Hx


Previously Healthy: Yes


Endocrine/Hematology History: Reports: Hx Diabetes, Hx Thyroid Disease - hyper, 

Other Endocrine/Hematological Disorders - hyper thyroid


   Denies: Hx Anemia


Cardiovascular History: Reports: Hx Hypertension


   Denies: Hx Congestive Heart Failure, Hx Pacemaker/ICD


Respiratory History: Reports: Hx Asthma


   Denies: Hx Chronic Obstructive Pulmonary Disease (COPD)


GI History: Reports: Other GI Disorders - patient states chronic nausea


   Denies: Hx Jaundice


 History: 


   Denies: Hx Dialysis, Hx Renal Disease


Musculoskeletal History: Reports: Hx Arthritis, Hx Back Problems, Hx 

Fibromyalgia


   Comment Only: Other Musculoskeletal History - fibromyalgia


Sensory History: Reports: Hx Contacts or Glasses


   Denies: Hx Hearing Aid


Opthamlomology History: Reports: Hx Contacts or Glasses


Neurological History: Reports: Hx Migraine, Hx Nerve Disease - fibromyalgia, 

Other Neuro Impairments/Disorders - vertigo


Psychiatric History: Reports: Hx Anxiety, Hx Depression, Hx Panic Disorder - 

ANXIETY, Hx Suicide Attempt


   Denies: Hx Eating Disorder





- Cancer History


Hx Chemotherapy: No


Hx Radiation Therapy: No





- Surgical History


Surgical History: Yes


Surgery Procedure, Year, and Place: PARTIAL HYSTERECTOMY 3/2000, TUBAL 1997, 

GALLBLADDER 1995





- Immunization History


Date of Tetanus Vaccine: Unk


Date of Influenza Vaccine: None Fall 2014


Immunizations Up to Date: Yes


Infectious Disease History: No


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 

Tuberculosis - pt reports ppd positive but chest xray negative.


   Denies: Traveled Outside the [place] in Last 30 Days





- Family History


Known Family History: Positive: None - reviewed & noncontributory, Other - 

negative FHx of breast CA


   Negative: Cardiac Disease, Hypertension, Diabetes





- Social History


Occupation: Employed Full-time


Lives: With Family


Alcohol Use: None


Hx Substance Use: No


Substance Use Type: Reports: None


Substance Use Comment - Amount & Last Used: Unknown


Hx Tobacco Use: No


Smoking Status (MU): Never Smoked Tobacco


Have You Smoked in the Last Year: No





Review of Systems


Negative: Fever, Chills


Positive: Chest Pain


Negative: Shortness Of Breath


Negative: Vomiting, Nausea


Positive: Other - radiated L jaw, L neck, and L back pain 


Negative: Headache


All Other Systems Reviewed And Are Negative: Yes





Physical Exam





- Summary


Physical Exam Summary: 





Appearance: The patient is well-nourished in no acute distress and in no acute 

pain.


 


Skin: The skin is warm and dry and skin color reflects adequate perfusion.





HEENT: The head is normocephalic and atraumatic. The pupils are equal and 

reactive. The conjunctivae are clear and without drainage. Nares are patent and 

without drainage. Mouth reveals moist mucous membranes and the throat is 

without erythema and exudate. The external ears are intact. The ear canals are 

patent and without drainage. The tympanic membranes are intact.


 


Neck: The neck is supple with full range of motion and non-tender. There are no 

carotid bruits. There is no neck vein distension.


 


Respiratory: Chest is non-tender. Lungs are clear to auscultation and breath 

sounds are symmetrical and equal.


 


Cardiovascular: Heart is regular rate and rhythm. There is no murmur or rub 

auscultated. There is no peripheral edema and pulses are symmetrical and equal. 

Tender in her Anterior Chest. 


 


Abdomen: The abdomen is soft and non-tender. There are normal bowel sounds 

heard in all four quadrants and there is no organomegaly palpated.


 


Musculoskeletal: There is no back tenderness noted. Extremities are non-tender 

with full range of motion. There is good capillary refill. There is no 

peripheral edema or calf tenderness elicited.


 


Neurological: Patient is alert and oriented to person, place and time. The 

patient has symmetrical motor strength in all four extremities. Cranial nerves 

are grossly intact. Deep tendon reflexes are symmetrical and equal in all four 

extremities.


 


Psychiatric: The patient has an appropriate affect and does not exhibit any 

anxiety or depression.





Triage Information Reviewed: Yes


Vital Signs On Initial Exam: 


 Initial Vitals











Temp Pulse Resp BP Pulse Ox


 


 98 F   107   22   155/102   100 


 


 01/02/20 14:17  01/02/20 14:17  01/02/20 14:17  01/02/20 14:17  01/02/20 14:17











Vital Signs Reviewed: Yes





Procedures





- Sedation


Patient Received Moderate/Deep Sedation with Procedure: No





Diagnostics





- Vital Signs


 Vital Signs











  Temp Pulse Resp BP Pulse Ox


 


 01/02/20 14:17  98 F  107  22  155/102  100














- Laboratory


Result Diagrams: 


 01/01/20 15:03





 01/01/20 15:03


Lab Statement: Any lab studies that have been ordered have been reviewed, and 

results considered in the medical decision making process.





- Radiology


  ** CXR


Radiology Interpretation Completed By: Radiologist


Summary of Radiographic Findings: No active cardiopulmonary disease. ED 

physician has reviewed this report.





- Ultrasound


  ** Gallbladder US


Ultrasound Interpretation Completed By: Radiologist


Summary of Ultrasound Findings: 1. No acute findings.  2. Gallbladder 

surgically absent. Mild dilatation of common bile duct, likely.  secondary to 

cholecystectomy.  3. Hepatic steatosis.  ED physician has reviewed this report.





- EKG


  ** 1422


Cardiac Rate: Tachycardia - 102 BPM


EKG Rhythm: Sinus Tachycardia


ST Segment: Normal


Ectopy: None


Summary of EKG Findings: Sinus Tachycardia at 102 BPM, normal ST, no ectopy, no 

STEMI. Interpreted by Dr. Mcdonald 1/2/2020 1425





Re-Evaluation





- Re-Evaluation


  ** First Eval


Re-Evaluation Time: 15:00


Change: Worse


Comment: Pt began vomiting.





Chest Pain Course/Dx





- Course


Course Of Treatment: Ms. Dumont comes in with a myriad of complaints.  She was 

nontoxic in appearance with stable vitals.  She was ruled out for any cardiac 

event.  She had mild liver enzyme elevations and I think it is likely that she 

has a viral infection at this point.  I recommended close follow-up with Dr. Lima and symptomatic treatment.





- Diagnoses


Provider Diagnoses: 


 Viral syndrome








Discharge ED





- Sign-Out/Discharge


Documenting (check all that apply): Patient Departure - discharge 





- Discharge Plan


Condition: Stable


Disposition: HOME


Patient Education Materials:  Viral Syndrome (ED)


Referrals: 


Lin Lima MD [Primary Care Provider] - 2 Days


Additional Instructions: 


PLEASE FOLLOW UP WITH YOUR PRIMARY CARE PROVIDER IN 1-3 DAYS AND RETURN TO THE 

EMERGENCY DEPARTMENT FOR ANY NEW OR WORSENING SYMPTOMS. 





- Billing Disposition and Condition


Condition: STABLE


Disposition: Home





- Attestation Statements


Document Initiated by René: Yes


Documenting Scribe: Solomon Avalos


Provider For Whom René is Documenting (Include Credential): Osito Mcdonald MD 


Scribe Attestation: 


I, Solomon Avalos, scribed for Osito Mcdonald MD  on 01/02/20 at 8309. 


Scribe Documentation Reviewed: Yes


Provider Attestation: 


The documentation as recorded by the Solomon howell accurately reflects 

the service I personally performed and the decisions made by me, Osito Mcdonald MD 


Status of Scribe Document: Viewed

## 2020-01-02 NOTE — XMS REPORT
Continuity of Care Document (CCD)

 Created on:2019



Patient:Marielos Dumont

Sex:Female

:1970

External Reference #:[id number]





Demographics







 Address  [address] [place] [id number]

 

 Home Phone  [phone]

 

 Mobile Phone  [phone]

 

 Work Phone  [phone]

 

 Preferred Language  en

 

 Marital Status  Declined to Specify/Unknown

 

 Mandaeism Affiliation  Unknown

 

 Race  Unknown

 

 Additional Race(s)  Other Race

 

 Ethnic Group  Declined to Specify/Unknown









Author







 Name  Maxi Samson DO MPH

 

 Address  [address] [place] [address],  [address]



   [place] [address]









Care Team Providers







 Name  Role  Phone

 

 Lin Lima M.D. - Family Medicine  Care Team Information   [phone]
[phone]









Problems







 Active Problems  Provider  Date

 

 Type 2 diabetes mellitus  Maxi Samson DO MPH  Onset: 2014







Social History







 Type  Date  Description  Comments

 

 Birth Sex    Unknown  

 

 Tobacco Use  Start: Unknown  Never Smoked Cigarettes  

 

 ETOH Use    Denies alcohol use  

 

 Tobacco Use  Start: Unknown  Patient has never smoked  

 

 Smoking Status  Reviewed: 19  Patient has never smoked  







Allergies, Adverse Reactions, Alerts







 Active Allergies  Reaction  Severity  Comments  Date

 

 Ibuprofen      vomitting, stomach pain  2014







Medications







 Active Medications  SIG  Qnty  Indications  Ordering  Date



         Provider  

 

 Oxycodone HCL  si by mouth  150tabs    Maxi Samson,  2015



            15mg  every 6 to 8      DO, MPH  



 Tablets  hours as directed        



   chronic pain        



   patient,        



   temporary        



   increase        

 

 Naproxen DR  prn      Unknown  



          500mg Tablets          



 DR Sotelo        Unknown  



      50mg Tablets          



           

 

 Singulair  1 po daily      Unknown  



        10mg Tablets          



           

 

 Venlafaxine HCL    30tabs    Unknown  



              225mg          



 Tablets          



           

 

 Lisinopril  1 by mouth daily      Unknown  



         5mg Tablets          



           

 

 Abilify  si by mouth      Unknown  



      2mg Tablets  every day as        



   directed        

 

 Reglan  1 by mouth every      Unknown  



     10mg Tablets  day        



           

 

 Methocarbamol  take one by mouth      Unknown  



            500mg  every 12 hours as        



 Tablets  directed chronic        



   pain.        

 

 Benadryl Allergy  1/2-1 by mouth      Unknown  



               25mg  three times a day        



 Tablets  as directed        



           

 

 Furosemide  by mouth every      Unknown  



         40mg Tablets  day        



           

 

 Chlorpromazine HCL    60tabs    Unknown  



                 25mg          



 Tablets          



           

 

 Gabapentin  si by mouth  90tabs    Unknown  



         600mg Tablets  three times a day        



   as directed        







Immunizations







 Description

 

 No Information Available







Vital Signs







 Date  Vital  Result  Comment

 

 2019  9:14am  BP Systolic  142 mmHg  









 BP Diastolic  86 mmHg  

 

 Heart Rate  84 /min  

 

 Respiratory Rate  20 /min  

 

 Height  60 inches  5'0"

 

 Weight  202.00 lb  

 

 Pain Level  6  Pain at this time.

 

 Pain Level With Medicine  5  on average with meds

 

 Pain Level Without Medicine  9  without meds

 

 BMI (Body Mass Index)  39.4 kg/m2  









 2019 11:22am  BP Systolic  134 mmHg  









 BP Diastolic  86 mmHg  

 

 Heart Rate  82 /min  

 

 Respiratory Rate  20 /min  

 

 Height  60 inches  5'0"

 

 Weight  190.00 lb  

 

 Pain Level  7  Pain at this time.

 

 Pain Level With Medicine  7  on average with meds

 

 Pain Level Without Medicine  9  without meds

 

 BMI (Body Mass Index)  37.1 kg/m2  







Results







 Description

 

 No Information Available







Procedures







 Date  Code  Description  Status

 

 2019  15152  Omt  3-4 Body Regions  Completed

 

 2019  69322  Therapeutic, Prophylactic Or Diagnostic Injection Subq/Im  
Completed

 

 2019  81669  Omt  1-2 Body Regions  Completed

 

 2019  64487  Therapeutic, Prophylactic Or Diagnostic Injection Subq/Im  
Completed

 

 2019  34682  Injection For Nerve Block, Other Peripheral Nerve Or  
Completed



     Branch  

 

 2019  94256  Injection, Single Or Mutiple Trigger Points One Or Two  
Completed



     Muscles  

 

 2019  07112  Inject Tendon/Ligament  Completed

 

 2019  77820  Inject Tendon/Ligament  Completed

 

 201950  Inject Tendon/Ligament  Completed

 

 201950  Inject Tendon/Ligament  Completed

 

 2019  28332  Arthrocentesis/Aspiration/Inj Of Major Joint Or Bursa W/  
Completed



     Ultra  

 

 2019  76696  Omt  3-4 Body Regions  Completed

 

 2019  42811  Therapeutic, Prophylactic Or Diagnostic Injection Subq/Im  
Completed







Medical Devices







 Description

 

 No Information Available







Encounters







 Type  Date  Location  Provider  Dx  Diagnosis

 

 Office Visit  2019  Main Office as Of  Maxi Samson DO, G89.29  Other 
chronic pain



   11:15a  14  [facility]    









 M54.2  Cervicalgia

 

 M54.6  Pain in thoracic spine

 

 M54.5  Low back pain

 

 M53.3  Sacrococcygeal disorders, not elsewhere classified

 

 Z79.891  Long term (current) use of opiate analgesic









 Office Visit  2019  3:15p  Main Office as  Maxi Samson,  G89.29  Other 
chronic



     Of 14  DO, MPH    pain









 M54.2  Cervicalgia

 

 M99.01  Segmental and somatic dysfunction of cervical region

 

 M54.6  Pain in thoracic spine

 

 M99.02  Segmental and somatic dysfunction of thoracic region

 

 M54.5  Low back pain

 

 M99.03  Segmental and somatic dysfunction of lumbar region

 

 R53.83  Other fatigue

 

 Z79.891  Long term (current) use of opiate analgesic









 Office Visit  2019  1:15p  Main Office as  Maxi Samson,  G89.29  Other 
chronic



     Of 14  DO, MPH    pain









 M54.5  Low back pain

 

 M99.03  Segmental and somatic dysfunction of lumbar region

 

 M53.3  Sacrococcygeal disorders, not elsewhere classified

 

 M99.04  Segmental and somatic dysfunction of sacral region

 

 M54.17  Radiculopathy, lumbosacral region

 

 M65.88  Other synovitis and tenosynovitis, other site

 

 M79.18  Myalgia, other site

 

 Z79.891  Long term (current) use of opiate analgesic

 

 R53.83  Other fatigue









 Office Visit  2019  2:30p  Main Office as  Maxi Samson,  G89.29  Other 
chronic



     Of 14  DO, MPH    pain









 M54.2  Cervicalgia

 

 M54.6  Pain in thoracic spine

 

 M70.22  Olecranon bursitis, left elbow

 

 Z79.891  Long term (current) use of opiate analgesic









 Office Visit  2019  2:30p  Main Office as  Maxi Samson  G89.29  Other 
chronic



     Of 14  DO, MPH    pain









 M54.2  Cervicalgia

 

 M99.01  Segmental and somatic dysfunction of cervical region

 

 M54.6  Pain in thoracic spine

 

 M99.02  Segmental and somatic dysfunction of thoracic region

 

 M54.5  Low back pain

 

 M99.03  Segmental and somatic dysfunction of lumbar region

 

 Z79.891  Long term (current) use of opiate analgesic

 

 R53.83  Other fatigue







Assessments







 Date  Code  Description  Provider

 

 2019  G89.29  Other chronic pain  Samson, Maxi, DO, MPH

 

 2019  M54.2  Cervicalgia  Samson, Maxi, DO, MPH

 

 2019  M54.5  Low back pain  Samson, Maxi, DO, MPH

 

 2019  M54.6  Pain in thoracic spine  Samson, Maxi, DO, MPH

 

 2019  Z79.891  Long term (current) use of opiate analgesic  Samson, Maxi
, DO, MPH

 

 2019  G89.29  Other chronic pain  Samosn, Maxi, DO, MPH

 

 2019  M54.2  Cervicalgia  Samson, Maxi, DO, MPH

 

 2019  M54.6  Pain in thoracic spine  Samson, Maxi, DO, MPH

 

 2019  M54.5  Low back pain  Samson, Maxi, DO, MPH

 

 2019  M53.3  Sacrococcygeal disorders, not elsewhere  Samson, Maxi, DO, 
[facility]



     classified  

 

 2019  Z79.891  Long term (current) use of opiate analgesic  Samson, Maxi
, DO, MPH

 

 2019  G89.29  Other chronic pain  Samson, Maxi, DO, MPH

 

 2019  M54.2  Cervicalgia  Samson, Maxi, DO, MPH

 

 2019  M99.01  Segmental and somatic dysfunction of  Samson, Maxi, DO, MPH



     cervical region  

 

 2019  M54.6  Pain in thoracic spine  Samson, Maxi, DO, MPH

 

 2019  M99.02  Segmental and somatic dysfunction of  Samson, Maxi, DO, MPH



     thoracic region  

 

 2019  M54.5  Low back pain  Samson, Maxi, DO, MPH

 

 2019  M99.03  Segmental and somatic dysfunction of lumbar  Samson, Maxi, 
DO, MPH



     region  

 

 2019  R53.83  Other fatigue  Samson, Maxi, DO, MPH

 

 2019  Z79.891  Long term (current) use of opiate analgesic  Samson, Maxi
, DO, MPH

 

 2019  G89.29  Other chronic pain  Samson, Maxi, DO, MPH

 

 2019  M54.5  Low back pain  Samson, Maxi, DO, MPH

 

 2019  M99.03  Segmental and somatic dysfunction of lumbar  Samson, Maxi, 
DO, MPH



     region  

 

 2019  M53.3  Sacrococcygeal disorders, not elsewhere  Samson, Maxi, DO, 
[facility]



     classified  

 

 2019  M99.04  Segmental and somatic dysfunction of sacral  Smason, Maxi, 
DO, MPH



     region  

 

 2019  M54.17  Radiculopathy, lumbosacral region  Samson, Maxi, DO, MPH

 

 2019  M65.88  Other synovitis and tenosynovitis, other  Samson, Maxi, DO
, MPH



     site  

 

 2019  M79.18  Myalgia, other site  Samson, Maxi, DO, MPH

 

 2019  Z79.891  Long term (current) use of opiate analgesic  Samson, Maxi
, DO, MPH

 

 2019  R53.83  Other fatigue  Samson, Maxi, DO, MPH

 

 2019  G89.29  Other chronic pain  Samson, Maxi, DO, MPH

 

 2019  M54.2  Cervicalgia  Samson, Maxi, DO, MPH

 

 2019  M54.6  Pain in thoracic spine  Samson, Maxi, DO, MPH

 

 2019  M70.22  Olecranon bursitis, left elbow  Samson, Maxi, DO, MPH

 

 2019  Z79.891  Long term (current) use of opiate analgesic  Samson, Maxi
, DO, MPH

 

 2019  G89.29  Other chronic pain  Samson, Maxi, DO, MPH

 

 2019  M54.2  Cervicalgia  Samson, Maxi, DO, MPH

 

 2019  M99.01  Segmental and somatic dysfunction of  Samson, Maxi, DO, MPH



     cervical region  

 

 2019  M54.6  Pain in thoracic spine  Samson, Maxi, DO, MPH

 

 2019  M99.02  Segmental and somatic dysfunction of  Samson, Maxi, DO, MPH



     thoracic region  

 

 2019  M54.5  Low back pain  Samson, Maxi, DO, MPH

 

 2019  M99.03  Segmental and somatic dysfunction of lumbar  Maxi Samson DO MPH



     region  

 

 2019  Z79.891  Long term (current) use of opiate analgesic  Maxi Samson DO, MPH

 

 2019  R53.83  Other fatigue  Maxi Samson DO MPH







Plan of Treatment

Future Appointment(s):2019 10:45 am - Maxi Samson DO MPH at Main 
Office as Of   9:15 am - Maxi Samson DO MPH at Main Office 
as Of  - Maxi Samson DO MPHG89.29 Other chronic painComments:
Chronic. Symptoms and complaints discussed and reviewed today. No significant 
changes in physical findings.  Continue current medical pain management.M54.2 
CervicalgiaComments:Chronic. Symptoms and complaints discussed and reviewed 
today. No significant changes in physical findings.  Continue current medical 
pain management.M54.5 Low back painComments:Chronic. Symptoms and complaints 
discussed and reviewed today.No changes in physical findings. Patient is stable 
and comfortable when current medical therapy is rendered.M54.6 Pain in thoracic 
spineComments:Chronic.Symptoms and complaints discussed and reviewed today. No 
significant changes in physical findings. Continue current medical pain 
management.Z79.891 Long term (current) use of opiate analgesicNew Labs:Urine 
Drug Screen, Ordered: 19Comments:Urine drug screen sample taken today to 
monitor opiate use and to monitor use of illicit substances.Will discuss 
results at next appointment.The following tests were ordered:6 AM, AMPH, DENIS, 
KANA, BUP, CARIS, COCM, ETG, FENT, MCSHSG,  OPI, OXY, PCP, TAPEN,  XTSY, ZOLP. 
     A urine drug test (UDT) was ordered for this patient and collected on site 
today.  Creatinine has been ordered as well for specimen validity, not for 
kidney function.  Preliminary UDT results are not final and should not be used 
to determine patient care or plan of treatment.  Initially a qualitative 
immunoassay screen will bedone.  Any inconsistent or positive findings will be 
further tested with a more comprehensive quantitative confirmation LCMS study. 
It is part of the treatment process of prescribing controlled substances and is 
considered standard of care.AllComments:Continue current medical pain management
;  injection therapy, osteopathic manipulation, PT / modalities, and consults 
as needed to manage chronic pain.Non - opioid pain management discussed and 
optionsdiscussed.Side effects discussed; anticipatory guidance given. Patient 
clearly understand and agree with all medical treatments and suggestions. All 
medicines prescribed are adequate and appropriate for this patient's complaint 
of pain, medical history, physical, and personal goals.Goals of Treatment are 
to provide adequate and appropriate multidisciplinary medical pain management 
to increase/ maintain patient's quality of life and functionality while 
maintaining satisfactory side effect profile andminimizing long term end-organ 
damage. Importance of regular nutrition throughout the day discussed.Activity 
as toleratedContinue with PCP



Functional Status







 Description

 

 No Information Available







Mental Status







 Description

 

 No Information Available







Referrals







 Description

 

 No Information Available